# Patient Record
Sex: FEMALE | Race: WHITE | NOT HISPANIC OR LATINO | URBAN - METROPOLITAN AREA
[De-identification: names, ages, dates, MRNs, and addresses within clinical notes are randomized per-mention and may not be internally consistent; named-entity substitution may affect disease eponyms.]

---

## 2019-11-20 ENCOUNTER — INPATIENT (INPATIENT)
Facility: HOSPITAL | Age: 64
LOS: 21 days | Discharge: PSYCHIATRIC OR SUBSTANCE USE FACILITY | End: 2019-12-12
Attending: INTERNAL MEDICINE | Admitting: INTERNAL MEDICINE
Payer: MEDICARE

## 2019-11-20 VITALS
SYSTOLIC BLOOD PRESSURE: 153 MMHG | DIASTOLIC BLOOD PRESSURE: 73 MMHG | OXYGEN SATURATION: 99 % | TEMPERATURE: 96 F | HEART RATE: 116 BPM | RESPIRATION RATE: 18 BRPM

## 2019-11-20 DIAGNOSIS — R41.9 UNSPECIFIED SYMPTOMS AND SIGNS INVOLVING COGNITIVE FUNCTIONS AND AWARENESS: ICD-10-CM

## 2019-11-20 LAB
ALBUMIN SERPL ELPH-MCNC: 4.6 G/DL — SIGNIFICANT CHANGE UP (ref 3.5–5.2)
ALP SERPL-CCNC: 75 U/L — SIGNIFICANT CHANGE UP (ref 30–115)
ALT FLD-CCNC: 48 U/L — HIGH (ref 0–41)
ANION GAP SERPL CALC-SCNC: 18 MMOL/L — HIGH (ref 7–14)
APAP SERPL-MCNC: <5 UG/ML — LOW (ref 10–30)
AST SERPL-CCNC: 53 U/L — HIGH (ref 0–41)
BASOPHILS # BLD AUTO: 0.06 K/UL — SIGNIFICANT CHANGE UP (ref 0–0.2)
BASOPHILS NFR BLD AUTO: 0.7 % — SIGNIFICANT CHANGE UP (ref 0–1)
BILIRUB SERPL-MCNC: 1 MG/DL — SIGNIFICANT CHANGE UP (ref 0.2–1.2)
BUN SERPL-MCNC: 4 MG/DL — LOW (ref 10–20)
CALCIUM SERPL-MCNC: 9.5 MG/DL — SIGNIFICANT CHANGE UP (ref 8.5–10.1)
CHLORIDE SERPL-SCNC: 96 MMOL/L — LOW (ref 98–110)
CO2 SERPL-SCNC: 24 MMOL/L — SIGNIFICANT CHANGE UP (ref 17–32)
CREAT SERPL-MCNC: 0.6 MG/DL — LOW (ref 0.7–1.5)
EOSINOPHIL # BLD AUTO: 0.06 K/UL — SIGNIFICANT CHANGE UP (ref 0–0.7)
EOSINOPHIL NFR BLD AUTO: 0.7 % — SIGNIFICANT CHANGE UP (ref 0–8)
ETHANOL SERPL-MCNC: <10 MG/DL — SIGNIFICANT CHANGE UP
GLUCOSE SERPL-MCNC: 133 MG/DL — HIGH (ref 70–99)
HCT VFR BLD CALC: 42.6 % — SIGNIFICANT CHANGE UP (ref 37–47)
HGB BLD-MCNC: 15.3 G/DL — SIGNIFICANT CHANGE UP (ref 12–16)
IMM GRANULOCYTES NFR BLD AUTO: 0.7 % — HIGH (ref 0.1–0.3)
LYMPHOCYTES # BLD AUTO: 2.85 K/UL — SIGNIFICANT CHANGE UP (ref 1.2–3.4)
LYMPHOCYTES # BLD AUTO: 33.2 % — SIGNIFICANT CHANGE UP (ref 20.5–51.1)
MCHC RBC-ENTMCNC: 31.6 PG — HIGH (ref 27–31)
MCHC RBC-ENTMCNC: 35.9 G/DL — SIGNIFICANT CHANGE UP (ref 32–37)
MCV RBC AUTO: 88 FL — SIGNIFICANT CHANGE UP (ref 81–99)
MONOCYTES # BLD AUTO: 0.6 K/UL — SIGNIFICANT CHANGE UP (ref 0.1–0.6)
MONOCYTES NFR BLD AUTO: 7 % — SIGNIFICANT CHANGE UP (ref 1.7–9.3)
NEUTROPHILS # BLD AUTO: 4.96 K/UL — SIGNIFICANT CHANGE UP (ref 1.4–6.5)
NEUTROPHILS NFR BLD AUTO: 57.7 % — SIGNIFICANT CHANGE UP (ref 42.2–75.2)
NRBC # BLD: 0 /100 WBCS — SIGNIFICANT CHANGE UP (ref 0–0)
PLATELET # BLD AUTO: 201 K/UL — SIGNIFICANT CHANGE UP (ref 130–400)
POTASSIUM SERPL-MCNC: 3.1 MMOL/L — LOW (ref 3.5–5)
POTASSIUM SERPL-SCNC: 3.1 MMOL/L — LOW (ref 3.5–5)
PROT SERPL-MCNC: 8.7 G/DL — HIGH (ref 6–8)
RBC # BLD: 4.84 M/UL — SIGNIFICANT CHANGE UP (ref 4.2–5.4)
RBC # FLD: 12.1 % — SIGNIFICANT CHANGE UP (ref 11.5–14.5)
SALICYLATES SERPL-MCNC: <0.3 MG/DL — LOW (ref 4–30)
SODIUM SERPL-SCNC: 138 MMOL/L — SIGNIFICANT CHANGE UP (ref 135–146)
WBC # BLD: 8.59 K/UL — SIGNIFICANT CHANGE UP (ref 4.8–10.8)
WBC # FLD AUTO: 8.59 K/UL — SIGNIFICANT CHANGE UP (ref 4.8–10.8)

## 2019-11-20 PROCEDURE — 70450 CT HEAD/BRAIN W/O DYE: CPT | Mod: 26

## 2019-11-20 PROCEDURE — 93010 ELECTROCARDIOGRAM REPORT: CPT

## 2019-11-20 PROCEDURE — 99285 EMERGENCY DEPT VISIT HI MDM: CPT

## 2019-11-20 PROCEDURE — 71045 X-RAY EXAM CHEST 1 VIEW: CPT | Mod: 26

## 2019-11-20 RX ORDER — POTASSIUM CHLORIDE 20 MEQ
40 PACKET (EA) ORAL ONCE
Refills: 0 | Status: COMPLETED | OUTPATIENT
Start: 2019-11-20 | End: 2019-11-20

## 2019-11-20 NOTE — ED PROVIDER NOTE - OBJECTIVE STATEMENT
64y/o F w/ recent diagnosis of psychosis 2/2 to traumatic event ( recently passed away while pt was in [place]) today pt was brought from [place] after discharge from psych facility.  Family members who bring pt says that she has no where to stay. pt denies suicidal or homicidal ideations.  alert&ox3, but it seems there are times pt does not recognize reality, repeats her name is "[facility]" as per family.  pt denies any cp or sob. no recent fevers or chills.  no cough or congestion.  denies hallucinations.

## 2019-11-20 NOTE — ED PROVIDER NOTE - ATTENDING CONTRIBUTION TO CARE
I personally evaluated the patient. I reviewed the Resident’s or Physician Assistant’s note (as assigned above), and agree with the findings and plan except as documented in my note.    Pt is a 64 y/o female with recently diagnosed psychosis after losing , and placement in psych facility in [place], presents to ED for evaluation for psychosis. Moderate, constant, no change since onset. Pt was moved here for eval. Unknown if pt compliant with meds. no SI/HI. No chest pain, SOB, abd pain, n/v, fever.    Constitutional: Well developed, well nourished. NAD.  Head: Normocephalic, atraumatic.  Eyes: PERRL. EOMI.  ENT: No nasal discharge. Mucous membranes moist.  Neck: Supple. Painless ROM.  Cardiovascular: Normal S1, S2. Regular rate and rhythm. No murmurs, rubs, or gallops.  Pulmonary: Normal respiratory rate and effort. Lungs clear to auscultation bilaterally. No wheezing, rales, or rhonchi.  Abdominal: Soft. Nondistended. Nontender. No rebound, guarding, rigidity.  Extremities. Pelvis stable. No lower extremity edema, symmetric calves.  Skin: No rashes, cyanosis.  Neuro: Awake, follows commands. Disoriented.  Psych: Normal mood. Normal affect.

## 2019-11-20 NOTE — ED BEHAVIORAL HEALTH ASSESSMENT NOTE - REFERRAL / APPOINTMENT DETAILS
Patient can be referred to [facility] for outpatient psychiatric care at [address] [phone].

## 2019-11-20 NOTE — ED BEHAVIORAL HEALTH ASSESSMENT NOTE - OTHER PAST PSYCHIATRIC HISTORY (INCLUDE DETAILS REGARDING ONSET, COURSE OF ILLNESS, INPATIENT/OUTPATIENT TREATMENT)
PPH limited as patient is not able to report dx or h/o treatment. Niece provides information about recent treatment, as above.

## 2019-11-20 NOTE — ED PROVIDER NOTE - PHYSICAL EXAMINATION
VITAL SIGNS: I have reviewed nursing notes and confirm.  CONSTITUTIONAL: Well-developed; well-nourished; in no acute distress. pt comfortable.  SKIN: skin exam is warm and dry, no acute rash.   HEAD: Normocephalic; atraumatic.  EYES:  EOM intact; conjunctiva and sclera clear.  ENT: No nasal discharge; airway clear. moist oral mucosa;   NECK: Supple; non tender.  CARD: S1, S2 normal; no murmurs, gallops, or rubs. Regular rate and rhythm. posterior tibial and radial pulses 2+  RESP: No wheezes, rales or rhonchi. cta b/l. no use of accessory muscles. no retractions  ABD: Normal bowel sounds; soft; non-distended; non-tender; no rebound. negative psoas, rovsign's and murphys.  EXT: Normal ROM. No  cyanosis or edema.  BACK: No cva tenderness  LYMPH: No acute cervical adenopathy.  NEURO: Alert, oriented, grossly unremarkable.  CN 2-12 intact. normal gait. normal romberg's.  sensory grossly intact to face, upper and lower extremity.  5/5 strength to , extension and flexion at elbow, flexion at hip, extension and flexion at knees.  PSYCH: denies hallucinations, follows commands, responds appropriately. wants to leave, but cooperates when told to wait for labs and psych.

## 2019-11-20 NOTE — ED BEHAVIORAL HEALTH ASSESSMENT NOTE - SUMMARY
64yo female, recently  with subsequent IPP admission and relocation to [place] with family after discharge. Patient with unclear psychiatric h/o, discharge dx acute psychosis, and remote h/o polysubstance abuse with no reported suicide attempts. Patient brought in by niece for a higher level of care with the family unable to provide appropriate supervision.  On exam, patient denies any depression or anxiety but is minimally engaged. She is a poor historian with no insight into her reason for presentation. there are no overt signs of psychosis and she denies any suicidal ideation. Patient appears to have significant  impairment in cognition, possibly secondary to h/o polysubstance abuse. Per collateral, patient appears to be in a chronic state of impairment with no acute signs since discharge from Health system.   Patient presentation appear consistent with neurocognitive impairment, increasing her risk for unintentional self-harm. It is unlikely that this would be mitigated by inpatient psychiatry hospitalization; however, she will likely benefit from a higher level of monitoring in the outpatient setting.

## 2019-11-20 NOTE — ED BEHAVIORAL HEALTH ASSESSMENT NOTE - DETAILS
Patient was admitted at [facility] for 7 days patient denies any suicidal thoughts or suicide attempts. discussed with saman voicemail left at [phone]

## 2019-11-20 NOTE — ED BEHAVIORAL HEALTH ASSESSMENT NOTE - DESCRIPTION
patient seen and evaluated by ED providers. Medical workup completed. none reported patient was  for over 25years, relocated with  to [place] for  to support his family.  was caregiver for pt until he passed recently. pt has 1 son who lives in [place]. Patient was staying with niece prior to ED presentation.

## 2019-11-20 NOTE — ED BEHAVIORAL HEALTH ASSESSMENT NOTE - MEDICATIONS (PRESCRIPTIONS, DIRECTIONS)
prolixin 1mg bid, trazodone 100mg qhs. Would recommended avoiding benzodiazepines as they can increase risk for delirium.

## 2019-11-20 NOTE — ED PROVIDER NOTE - CLINICAL SUMMARY MEDICAL DECISION MAKING FREE TEXT BOX
pt presented to ED for evaluation for psychosis. LAbs, EKG, advanced imaging obtained. Pt evaluated by psychiatry and cleared from their service, do not believe it is acute onset psychosis but rather underlying delirium. Will admit to medicine for further eval.

## 2019-11-20 NOTE — ED BEHAVIORAL HEALTH ASSESSMENT NOTE - SAFETY PLAN DETAILS
patient unable to engage in safety planning secondary to cognitive impairment. Patient warrants a higher level of outpatient observation

## 2019-11-20 NOTE — ED BEHAVIORAL HEALTH ASSESSMENT NOTE - NS ED BHA MED ROS ENT MOUTH
Abdomen soft, nondistended, non-tender, no rebound or guarding.  Rectal: gross, bright red blood on glove tip, external hemorrhoid noted No complaints

## 2019-11-20 NOTE — ED BEHAVIORAL HEALTH ASSESSMENT NOTE - HPI (INCLUDE ILLNESS QUALITY, SEVERITY, DURATION, TIMING, CONTEXT, MODIFYING FACTORS, ASSOCIATED SIGNS AND SYMPTOMS)
62yo female, recently  with subsequent IPP admission and relocation to [place] with family after discharge. Patient with unclear psychiatric h/o, discharge dx acute psychosis, and remote h/o polysubstance abuse with no reported suicide attempts. Patient brought in by niece for a higher level of care with the family unable to provide appropriate supervision.  Patient does not know why she was brought to the hospital and denies any concerns. She denies feeling sad or having any worries. She denies any perceptual disturbances. She denies any h/o trauma.   She answers most questions with "I don't know", unable to list her medications, psychiatric diagnosis/hospitalizations, reason for presentation. Patient reports feeling safe at sister's home, but states that she doesn't do anything during the day.     Collateral from patient's niece: she states that after patient's   suddenly, the patient told her son that he was sleeping and wouldn't wake up. Once he was pronounced dead, the patient ran into street wailing "who's going to take care of me" with some mention of ending her life. She was subsequently hospitalized and discharged with the above diagnosis. Patient reports that the hospital recommended patient be transferred to a long-term care facility; however, with family in [place] she was relocated. Niece reports that now patient appears to be in her chronic state of impairment, with loose associations and poor insight and recall. She states that the patient has been like this for years and is why the  had to provide close supervision; however, there is no one available in the family to provide this level of care now. The family reports that they are unable to safely supervise her.

## 2019-11-20 NOTE — ED BEHAVIORAL HEALTH ASSESSMENT NOTE - ADDITIONAL DETAILS / COMMENTS
patient unable to state who the president is. unable to state the date. loose associations with words that she sees, for example "FIRE EXTINGUISHER" - patient says "why is there a fire".   unable to list months of the year in reverse

## 2019-11-21 LAB
ALBUMIN SERPL ELPH-MCNC: 4.2 G/DL — SIGNIFICANT CHANGE UP (ref 3.5–5.2)
ALP SERPL-CCNC: 68 U/L — SIGNIFICANT CHANGE UP (ref 30–115)
ALT FLD-CCNC: 47 U/L — HIGH (ref 0–41)
ANION GAP SERPL CALC-SCNC: 13 MMOL/L — SIGNIFICANT CHANGE UP (ref 7–14)
AST SERPL-CCNC: 46 U/L — HIGH (ref 0–41)
BILIRUB SERPL-MCNC: 0.9 MG/DL — SIGNIFICANT CHANGE UP (ref 0.2–1.2)
BUN SERPL-MCNC: 6 MG/DL — LOW (ref 10–20)
CALCIUM SERPL-MCNC: 9.3 MG/DL — SIGNIFICANT CHANGE UP (ref 8.5–10.1)
CHLORIDE SERPL-SCNC: 101 MMOL/L — SIGNIFICANT CHANGE UP (ref 98–110)
CO2 SERPL-SCNC: 27 MMOL/L — SIGNIFICANT CHANGE UP (ref 17–32)
CREAT SERPL-MCNC: 0.5 MG/DL — LOW (ref 0.7–1.5)
GLUCOSE SERPL-MCNC: 106 MG/DL — HIGH (ref 70–99)
HCT VFR BLD CALC: 43.8 % — SIGNIFICANT CHANGE UP (ref 37–47)
HGB BLD-MCNC: 15.6 G/DL — SIGNIFICANT CHANGE UP (ref 12–16)
MAGNESIUM SERPL-MCNC: 1.9 MG/DL — SIGNIFICANT CHANGE UP (ref 1.8–2.4)
MCHC RBC-ENTMCNC: 31.5 PG — HIGH (ref 27–31)
MCHC RBC-ENTMCNC: 35.6 G/DL — SIGNIFICANT CHANGE UP (ref 32–37)
MCV RBC AUTO: 88.5 FL — SIGNIFICANT CHANGE UP (ref 81–99)
NRBC # BLD: 0 /100 WBCS — SIGNIFICANT CHANGE UP (ref 0–0)
PLATELET # BLD AUTO: 175 K/UL — SIGNIFICANT CHANGE UP (ref 130–400)
POTASSIUM SERPL-MCNC: 3.9 MMOL/L — SIGNIFICANT CHANGE UP (ref 3.5–5)
POTASSIUM SERPL-SCNC: 3.9 MMOL/L — SIGNIFICANT CHANGE UP (ref 3.5–5)
PROT SERPL-MCNC: 8.2 G/DL — HIGH (ref 6–8)
RBC # BLD: 4.95 M/UL — SIGNIFICANT CHANGE UP (ref 4.2–5.4)
RBC # FLD: 12 % — SIGNIFICANT CHANGE UP (ref 11.5–14.5)
SODIUM SERPL-SCNC: 141 MMOL/L — SIGNIFICANT CHANGE UP (ref 135–146)
TSH SERPL-MCNC: 0.61 UIU/ML — SIGNIFICANT CHANGE UP (ref 0.27–4.2)
VIT B12 SERPL-MCNC: 719 PG/ML — SIGNIFICANT CHANGE UP (ref 232–1245)
WBC # BLD: 7.12 K/UL — SIGNIFICANT CHANGE UP (ref 4.8–10.8)
WBC # FLD AUTO: 7.12 K/UL — SIGNIFICANT CHANGE UP (ref 4.8–10.8)

## 2019-11-21 PROCEDURE — 99222 1ST HOSP IP/OBS MODERATE 55: CPT | Mod: AI

## 2019-11-21 RX ORDER — TRAZODONE HCL 50 MG
100 TABLET ORAL AT BEDTIME
Refills: 0 | Status: DISCONTINUED | OUTPATIENT
Start: 2019-11-21 | End: 2019-12-10

## 2019-11-21 RX ORDER — FLUPHENAZINE HYDROCHLORIDE 1 MG/1
1 TABLET, FILM COATED ORAL
Refills: 0 | Status: DISCONTINUED | OUTPATIENT
Start: 2019-11-21 | End: 2019-12-10

## 2019-11-21 RX ORDER — CHLORHEXIDINE GLUCONATE 213 G/1000ML
1 SOLUTION TOPICAL
Refills: 0 | Status: DISCONTINUED | OUTPATIENT
Start: 2019-11-21 | End: 2019-12-12

## 2019-11-21 RX ORDER — NICOTINE POLACRILEX 2 MG
1 GUM BUCCAL DAILY
Refills: 0 | Status: DISCONTINUED | OUTPATIENT
Start: 2019-11-21 | End: 2019-11-23

## 2019-11-21 RX ADMIN — Medication 1 PATCH: at 12:44

## 2019-11-21 RX ADMIN — Medication 40 MILLIEQUIVALENT(S): at 01:07

## 2019-11-21 RX ADMIN — Medication 100 MILLIGRAM(S): at 21:46

## 2019-11-21 RX ADMIN — FLUPHENAZINE HYDROCHLORIDE 1 MILLIGRAM(S): 1 TABLET, FILM COATED ORAL at 06:23

## 2019-11-21 RX ADMIN — Medication 1 PATCH: at 21:47

## 2019-11-21 RX ADMIN — Medication 100 MILLIGRAM(S): at 06:22

## 2019-11-21 RX ADMIN — FLUPHENAZINE HYDROCHLORIDE 1 MILLIGRAM(S): 1 TABLET, FILM COATED ORAL at 17:00

## 2019-11-21 NOTE — H&P ADULT - NSHPSOCIALHISTORY_GEN_ALL_CORE
Smoker 1 paq per day for a long time  Denies alcohol use or illicit drug use  Patient was  for over 25years, relocated with  to [place] for  to support his family.  was caregiver for pt until he passed recently. pt has 1 son who lives in [place]. Patient was staying with niece prior to ED presentation.

## 2019-11-21 NOTE — CONSULT NOTE ADULT - ASSESSMENT
IMPRESSION: Rehab of gait dysfunction    PRECAUTIONS: [  ] Cardiac  [  ] Respiratory  [  ] Seizures [  ] Contact Isolation  [  ] Droplet Isolation  [  ] Other    Weight Bearing Status:     RECOMMENDATION:    Out of Bed to Chair     DVT/Decubiti Prophylaxis    REHAB PLAN:     [ x  ] Bedside P/T 3-5 times a week   [   ]   Bedside O/T  2-3 times a week             [   ] No Rehab Therapy Indicated                   [   ]  Speech Therapy   Conditioning/ROM                                    ADL  Bed Mobility                                               Conditioning/ROM  Transfers                                                     Bed Mobility  Sitting /Standing Balance                         Transfers                                        Gait Training                                               Sitting/Standing Balance  Stair Training [   ]Applicable                    Home equipment Eval                                                                        Splinting  [   ] Only      GOALS:   ADL   [   ]   Independent                    Transfers  [x   ] Independent                          Ambulation  [ x  ] Independent     [  x  ] With device                            [   ]  CG                                                         [   ]  CG                                                                  [   ] CG                            [    ] Min A                                                   [   ] Min A                                                              [   ] Min  A          DISCHARGE PLAN:   [   ]  Good candidate for Intensive Rehabilitation/Hospital based                                             Will tolerate 3hrs Intensive Rehab Daily                                       [ x   ]  Short Term Rehab in Skilled Nursing Facility                            vs           [ x   ]  Home with Outpatient or VN services                                         [    ]  Possible Candidate for Intensive Hospital based Rehab

## 2019-11-21 NOTE — CONSULT NOTE ADULT - SUBJECTIVE AND OBJECTIVE BOX
HPI:  Ms. Matos is 62 yo female with no known PMH recently  with subsequent IPP admission and relocation to [place] with family after discharge. Patient with unclear psychiatric h/o, discharge dx acute psychosis, and remote h/o polysubstance abuse with no reported suicide attempts. Patient brought in by niece for a higher level of care with the family unable to provide appropriate supervision.    Patient does not know why she was brought to the hospital and denies any concerns. She denies feeling sad or having any worries. She denies any perceptual disturbances. She denies any h/o trauma. She answers most questions with "I don't know", unable to list her medications, psychiatric diagnosis/hospitalizations, reason for presentation. Patient reports feeling safe at sister's home, but states that she doesn't do anything during the day.     Patient's niece reports that the hospital recommended patient be transferred to a long-term care facility; however, with family in [place] she was relocated. Niece reports that now patient appears to be in her chronic state of impairment, with loose associations and poor insight and recall. She states that the patient has been like this for years and is why the  had to provide close supervision; however, there is no one available in the family to provide this level of care now. The family reports that they are unable to safely supervise her.    Currently patient denies any chest pain or sob, no recent fevers or chills, no cough or congestion, denies hallucinations. Patient was evaluated by psychiatry, differential include: neurocognitive impairment, tbi, complex bereavement, psychosis. (21 Nov 2019 05:13)      PAST MEDICAL & SURGICAL HISTORY:  Psychosis      Hospital Course:    TODAY'S SUBJECTIVE & REVIEW OF SYMPTOMS:     Constitutional WNL   Cardio WNL   Resp WNL   GI WNL  Heme WNL  Endo WNL  Skin WNL  MSK Weakness  Neuro WNL  Cognitive WNL  Psych agitated      MEDICATIONS  (STANDING):  chlorhexidine 4% Liquid 1 Application(s) Topical <User Schedule>  fluPHENAZine 1 milliGRAM(s) Oral two times a day  traZODone 100 milliGRAM(s) Oral at bedtime    MEDICATIONS  (PRN):      FAMILY HISTORY:      Allergies    No Known Allergies    Intolerances        SOCIAL HISTORY:    [  ] Etoh  [  ] Smoking  [  ] Substance abuse     Home Environment:  [  ] Home Alone  [x  ] Lives with Family  [  ] Home Health Aid    Dwelling:  [x  ] Apartment  [  ] Private House  [  ] Adult Home  [  ] Skilled Nursing Facility      [  ] Short Term  [  ] Long Term  [  ] Stairs       Elevator [x  ]    FUNCTIONAL STATUS PTA: (Check all that apply)  Ambulation: [ x  ]Independent    [  ] Dependent     [  ] Non-Ambulatory  Assistive Device: [  ] SA Cane  [  ]  Q Cane  [  ] Walker  [  ]  Wheelchair  ADL : [ x ] Independent  [  ]  Dependent       Vital Signs Last 24 Hrs  T(C): 36.8 (21 Nov 2019 07:37), Max: 36.8 (21 Nov 2019 07:37)  T(F): 98.3 (21 Nov 2019 07:37), Max: 98.3 (21 Nov 2019 07:37)  HR: 82 (21 Nov 2019 07:37) (75 - 116)  BP: 120/65 (21 Nov 2019 07:37) (110/72 - 153/73)  BP(mean): 87 (21 Nov 2019 06:04) (87 - 87)  RR: 18 (21 Nov 2019 07:37) (18 - 18)  SpO2: 96% (21 Nov 2019 07:37) (95% - 99%)      PHYSICAL EXAM: Alert & awake  GENERAL: NAD, well-groomed, well-developed  HEAD:  Atraumatic, Normocephalic  CHEST/LUNG: Clear   HEART: S1S2+  ABDOMEN: Soft, Nontender  EXTREMITIES:  no calf tenderness    NERVOUS SYSTEM:  Cranial Nerves 2-12 intact [  ] Abnormal  [  ]  ROM: WFL all extremities [ x ]  Abnormal [  ]  Motor Strength: WFL all extremities  [x  ]  Abnormal [  ]  Sensation: intact to light touch [ x ] Abnormal [  ]  Reflexes: Symmetric [  ]  Abnormal [  ]    FUNCTIONAL STATUS:  Bed Mobility: Independent [  ]  Supervision [ x ]  Needs Assistance [  ]  N/A [  ]  Transfers: Independent [  ]  Supervision [  ]  Needs Assistance [x  ]  N/A [  ]   Ambulation: Independent [  ]  Supervision [  ]  Needs Assistance [  ]  N/A [  ]  ADL: Independent [  ] Requires Assistance [  ] N/A [  ]      LABS:                        15.3   8.59  )-----------( 201      ( 20 Nov 2019 20:00 )             42.6     11-20    138  |  96<L>  |  4<L>  ----------------------------<  133<H>  3.1<L>   |  24  |  0.6<L>    Ca    9.5      20 Nov 2019 20:00    TPro  8.7<H>  /  Alb  4.6  /  TBili  1.0  /  DBili  x   /  AST  53<H>  /  ALT  48<H>  /  AlkPhos  75  11-20          RADIOLOGY & ADDITIONAL STUDIES:    Assesment:

## 2019-11-21 NOTE — ED ADULT NURSE NOTE - NSIMPLEMENTINTERV_GEN_ALL_ED
Implemented All Fall with Harm Risk Interventions:  [place] to call system. Call bell, personal items and telephone within reach. Instruct patient to call for assistance. Room bathroom lighting operational. Non-slip footwear when patient is off stretcher. Physically safe environment: no spills, clutter or unnecessary equipment. Stretcher in lowest position, wheels locked, appropriate side rails in place. Provide visual cue, wrist band, yellow gown, etc. Monitor gait and stability. Monitor for mental status changes and reorient to person, place, and time. Review medications for side effects contributing to fall risk. Reinforce activity limits and safety measures with patient and family. Provide visual clues: red socks.

## 2019-11-21 NOTE — ED ADULT NURSE NOTE - OBJECTIVE STATEMENT
pt brought in by family for suicidal thoughts. as per family, pt was in psych facility in [place] and was D/C with ativan. family states pt was homeless so drove pt to [place] and took her to hospital. family also states pt recently had a traumatic event; her  passed. pt is axox3 at times but with periods of disorientation. pt states " my name is [facility]" repeatedly asking if she is dying.

## 2019-11-21 NOTE — H&P ADULT - ATTENDING COMMENTS
Patient seen and examined independently. I agree with the resident's note, physical exam, and plan except as below.  Vital Signs Last 24 Hrs  T(C): 36.5 (21 Nov 2019 12:43), Max: 36.8 (21 Nov 2019 07:37)  T(F): 97.7 (21 Nov 2019 12:43), Max: 98.3 (21 Nov 2019 07:37)  HR: 84 (21 Nov 2019 12:43) (75 - 116)  BP: 123/78 (21 Nov 2019 12:43) (110/72 - 153/73)  BP(mean): 87 (21 Nov 2019 06:04) (87 - 87)  RR: 18 (21 Nov 2019 12:43) (18 - 18)  SpO2: 98% (21 Nov 2019 12:43) (95% - 99%)  Pe  nad  aaox3  anaswers "I don't know" alot  [id number]  ctabl  soft ntnd+bs   no cce  nonfocal  no si/hi/hallucinations    #psychosis nos/atypical bereavement/ cognitive decline  check utox, b12, folate, rpr, hiv,   cont meds per psych consult -, no IPP  if  family unable to care - may need long term placement

## 2019-11-21 NOTE — H&P ADULT - ASSESSMENT
Ms. Matos is 64 yo female with no known PMH recently  with subsequent IPP admission and relocation to [place] with family after discharge. Patient with unclear psychiatric history, discharge diagnosis of acute psychosis, and remote h/o polysubstance abuse with no reported suicide attempts. Patient brought in by niece for a higher level of care with the family unable to provide appropriate supervision.    # R/o neurocognitive impairment, complex bereavement or psychosis.  - CT head: no CT evidence of acute intracranial pathology.  - Evaluated by psychiatry: Low Acute Suicide Risk, no h/o suicide attempt, no current suicidal ideation.   - Continue prolixin 1mg bid, trazodone 100mg qhs.  - avoiding benzodiazepines as they can increase risk for delirium.  - Patient warrants a higher level of outpatient observation,  consult placed

## 2019-11-21 NOTE — H&P ADULT - NSHPPHYSICALEXAM_GEN_ALL_CORE
Vital Signs Last 24 Hrs  T(C): 35.7 (20 Nov 2019 19:06), Max: 35.7 (20 Nov 2019 19:06)  T(F): 96.3 (20 Nov 2019 19:06), Max: 96.3 (20 Nov 2019 19:06)  HR: 75 (21 Nov 2019 00:05) (75 - 116)  BP: 110/72 (21 Nov 2019 00:05) (110/72 - 153/73)  RR: 18 (20 Nov 2019 19:06) (18 - 18)  SpO2: 99% (20 Nov 2019 19:06) (99% - 99%)      PHYSICAL EXAM:  GENERAL: NAD, speaks in full sentences, no signs of respiratory distress, on room air, anxious  HEAD:  Atraumatic, Normocephalic  EYES: injected conjunctiva and anicteric sclera  NECK: Supple  CHEST/LUNG: Clear to auscultation bilaterally; No wheeze; No crackles; No accessory muscles used  HEART: Regular rate and rhythm; No murmurs;   ABDOMEN: Soft, Nontender, Nondistended; Bowel sounds present; No guarding  EXTREMITIES:  2+ Peripheral Pulses, No cyanosis or edema  PSYCH: AAO*1  NEUROLOGY: non-focal  SKIN: No rashes or lesions

## 2019-11-21 NOTE — H&P ADULT - HISTORY OF PRESENT ILLNESS
Ms. Matos is 64 yo female with no known PMH recently  with subsequent IPP admission and relocation to [place] with family after discharge. Patient with unclear psychiatric h/o, discharge dx acute psychosis, and remote h/o polysubstance abuse with no reported suicide attempts. Patient brought in by niece for a higher level of care with the family unable to provide appropriate supervision.    Patient does not know why she was brought to the hospital and denies any concerns. She denies feeling sad or having any worries. She denies any perceptual disturbances. She denies any h/o trauma. She answers most questions with "I don't know", unable to list her medications, psychiatric diagnosis/hospitalizations, reason for presentation. Patient reports feeling safe at sister's home, but states that she doesn't do anything during the day.     Patient's niece reports that the hospital recommended patient be transferred to a long-term care facility; however, with family in [place] she was relocated. Niece reports that now patient appears to be in her chronic state of impairment, with loose associations and poor insight and recall. She states that the patient has been like this for years and is why the  had to provide close supervision; however, there is no one available in the family to provide this level of care now. The family reports that they are unable to safely supervise her.    Currently patient denies any chest pain or sob, no recent fevers or chills, no cough or congestion, denies hallucinations. Patient was evaluated by psychiatry, differential include: neurocognitive impairment, tbi, complex bereavement, psychosis.

## 2019-11-22 LAB
AMPHET UR-MCNC: NEGATIVE — SIGNIFICANT CHANGE UP
BARBITURATES UR SCN-MCNC: NEGATIVE — SIGNIFICANT CHANGE UP
BENZODIAZ UR-MCNC: NEGATIVE — SIGNIFICANT CHANGE UP
COCAINE METAB.OTHER UR-MCNC: NEGATIVE — SIGNIFICANT CHANGE UP
HCV AB S/CO SERPL IA: 11.95 S/CO — HIGH (ref 0–0.99)
HCV AB SERPL-IMP: REACTIVE
HIV 1+2 AB+HIV1 P24 AG SERPL QL IA: SIGNIFICANT CHANGE UP
METHADONE UR-MCNC: NEGATIVE — SIGNIFICANT CHANGE UP
OPIATES UR-MCNC: NEGATIVE — SIGNIFICANT CHANGE UP
PCP SPEC-MCNC: SIGNIFICANT CHANGE UP
PROPOXYPHENE QUALITATIVE URINE RESULT: NEGATIVE — SIGNIFICANT CHANGE UP
T PALLIDUM AB TITR SER: NEGATIVE — SIGNIFICANT CHANGE UP

## 2019-11-22 PROCEDURE — 99232 SBSQ HOSP IP/OBS MODERATE 35: CPT

## 2019-11-22 RX ORDER — ENOXAPARIN SODIUM 100 MG/ML
40 INJECTION SUBCUTANEOUS DAILY
Refills: 0 | Status: DISCONTINUED | OUTPATIENT
Start: 2019-11-22 | End: 2019-12-10

## 2019-11-22 RX ADMIN — Medication 1 PATCH: at 08:35

## 2019-11-22 RX ADMIN — Medication 1 PATCH: at 12:44

## 2019-11-22 RX ADMIN — Medication 1 PATCH: at 12:58

## 2019-11-22 RX ADMIN — FLUPHENAZINE HYDROCHLORIDE 1 MILLIGRAM(S): 1 TABLET, FILM COATED ORAL at 17:38

## 2019-11-22 RX ADMIN — Medication 100 MILLIGRAM(S): at 21:20

## 2019-11-22 RX ADMIN — FLUPHENAZINE HYDROCHLORIDE 1 MILLIGRAM(S): 1 TABLET, FILM COATED ORAL at 06:31

## 2019-11-22 NOTE — BEHAVIORAL HEALTH ASSESSMENT NOTE - NSBHCONSULTRECOMMENDOTHER_PSY_A_CORE FT
Patient denies any depression, anxiety or suicidal ideations and is not exhibiting any overt signs of psychosis. Patient appears to have significant impairment in cognition, possibly secondary to history of polysubstance abuse. As per collateral, patient is unable to perform ADLs and there have been no acute changes since her discharge from [facility] in [place]. Patient presentation is consistent with neurocognitive impairment, increasing her risk for unintentional self harm. She will benefit from being place in a long term care facility for dementia. Patient denies any depression, anxiety or suicidal ideations and is not exhibiting any overt signs of psychosis. Patient appears to have significant impairment in cognition, possibly secondary to history of polysubstance abuse. Patient presentation is consistent with neurocognitive impairment, and she does not appear that she would be able to perform her ADLs independently.   Considering her history of psychosis based on reports form collateral will recommend to continue the Fluphenazine 1 mg BID. Can also use trazodoen for insomnia.   She will benefit from being place in a long term care facility for dementia.

## 2019-11-22 NOTE — BEHAVIORAL HEALTH ASSESSMENT NOTE - ADDITIONAL DETAILS / COMMENTS
patient unable to state who the president is. unable to state the date. loose associations with words that she sees, for example "FIRE EXTINGUISHER" - patient says "why is there a fire".   unable to list months of the year in reverse Oriented x 2 - person and place. Not oriented to situation or time

## 2019-11-22 NOTE — PROGRESS NOTE ADULT - SUBJECTIVE AND OBJECTIVE BOX
BHAVESH LOWERY 63y Female  MRN#: [id number]   CODE STATUS:________      SUBJECTIVE  Patient is a 63y old Female who presents with a chief complaint of Change in mental status (22 Nov 2019 12:34)  Currently admitted to medicine with the primary diagnosis of Mental status change resolved    Today is hospital day 1d, patient is admitted for placement/depression/unable to take care of her self by niece. This morning she has no new issue, she doesn't like to engage in conversation.        OBJECTIVE  PAST MEDICAL & SURGICAL HISTORY  Psychosis    ALLERGIES:  No Known Allergies    MEDICATIONS:  STANDING MEDICATIONS  chlorhexidine 4% Liquid 1 Application(s) Topical <User Schedule>  fluPHENAZine 1 milliGRAM(s) Oral two times a day  nicotine - 21 mG/24Hr(s) Patch 1 patch Transdermal daily  traZODone 100 milliGRAM(s) Oral at bedtime    PRN MEDICATIONS      VITAL SIGNS: Last 24 Hours  T(C): 36.9 (22 Nov 2019 06:37), Max: 36.9 (21 Nov 2019 21:23)  T(F): 98.5 (22 Nov 2019 06:37), Max: 98.5 (22 Nov 2019 06:37)  HR: 79 (22 Nov 2019 06:37) (74 - 79)  BP: 138/84 (22 Nov 2019 06:37) (125/58 - 138/84)  BP(mean): --  RR: 18 (22 Nov 2019 06:37) (18 - 19)  SpO2: 97% (21 Nov 2019 21:23) (97% - 97%)    PHYSICAL EXAM:  GENERAL: NAD, well-developed, AAOx2-3, difficult to concentrate  HEENT:  Atraumatic, Normocephalic. EOMI, PERRLA, conjunctiva and sclera clear, No JVD  PULMONARY: Clear to auscultation bilaterally; No wheeze  CARDIOVASCULAR: Regular rate and rhythm; No murmurs, rubs, or gallops  GASTROINTESTINAL: Soft, Nontender, Nondistended; Bowel sounds present  MUSCULOSKELETAL:  2+ Peripheral Pulses, No clubbing, cyanosis, or edema  NEUROLOGY: non-focal  SKIN: No rashes or lesions    LABS:                        15.6   7.12  )-----------( 175      ( 21 Nov 2019 12:18 )             43.8     11-21    141  |  101  |  6<L>  ----------------------------<  106<H>  3.9   |  27  |  0.5<L>    Ca    9.3      21 Nov 2019 12:18  Mg     1.9     11-21    TPro  8.2<H>  /  Alb  4.2  /  TBili  0.9  /  DBili  x   /  AST  46<H>  /  ALT  47<H>  /  AlkPhos  68  11-21                  RADIOLOGY:  CT Head No Cont (11.20.19 @ 22:32) >  Impression:     No CT evidence of acute intracranial pathology. BHAVESH LOWERY 63y Female  MRN#: [id number]   CODE STATUS: Full      SUBJECTIVE  Patient is a 63y old Female who presents with a chief complaint of Change in mental status (22 Nov 2019 12:34)  Currently admitted to medicine with the primary diagnosis of Mental status change resolved    Today is hospital day 1d, patient is admitted for placement/depression/unable to take care of her self by niece. This morning she has no new issue, she doesn't like to engage in conversation.        OBJECTIVE  PAST MEDICAL & SURGICAL HISTORY  Psychosis    ALLERGIES:  No Known Allergies    MEDICATIONS:  STANDING MEDICATIONS  chlorhexidine 4% Liquid 1 Application(s) Topical <User Schedule>  fluPHENAZine 1 milliGRAM(s) Oral two times a day  nicotine - 21 mG/24Hr(s) Patch 1 patch Transdermal daily  traZODone 100 milliGRAM(s) Oral at bedtime    PRN MEDICATIONS      VITAL SIGNS: Last 24 Hours  T(C): 36.9 (22 Nov 2019 06:37), Max: 36.9 (21 Nov 2019 21:23)  T(F): 98.5 (22 Nov 2019 06:37), Max: 98.5 (22 Nov 2019 06:37)  HR: 79 (22 Nov 2019 06:37) (74 - 79)  BP: 138/84 (22 Nov 2019 06:37) (125/58 - 138/84)  BP(mean): --  RR: 18 (22 Nov 2019 06:37) (18 - 19)  SpO2: 97% (21 Nov 2019 21:23) (97% - 97%)    PHYSICAL EXAM:  GENERAL: NAD, well-developed, AAOx2-3, difficult to concentrate  HEENT:  Atraumatic, Normocephalic. EOMI, PERRLA, conjunctiva and sclera clear, No JVD  PULMONARY: Clear to auscultation bilaterally; No wheeze  CARDIOVASCULAR: Regular rate and rhythm; No murmurs, rubs, or gallops  GASTROINTESTINAL: Soft, Nontender, Nondistended; Bowel sounds present  MUSCULOSKELETAL:  2+ Peripheral Pulses, No clubbing, cyanosis, or edema  NEUROLOGY: non-focal  SKIN: No rashes or lesions    LABS:                        15.6   7.12  )-----------( 175      ( 21 Nov 2019 12:18 )             43.8     11-21    141  |  101  |  6<L>  ----------------------------<  106<H>  3.9   |  27  |  0.5<L>    Ca    9.3      21 Nov 2019 12:18  Mg     1.9     11-21    TPro  8.2<H>  /  Alb  4.2  /  TBili  0.9  /  DBili  x   /  AST  46<H>  /  ALT  47<H>  /  AlkPhos  68  11-21                  RADIOLOGY:  CT Head No Cont (11.20.19 @ 22:32) >  Impression:     No CT evidence of acute intracranial pathology.

## 2019-11-22 NOTE — PROGRESS NOTE ADULT - ASSESSMENT
62 yo female with no known PMH recently  with subsequent IPP admission in [place] in [place] and relocation to [place] with family after discharge. Patient with unclear psychiatric h/o, discharge dx acute psychosis, and remote h/o polysubstance abuse with no reported suicide attempts. Patient brought in by niece for a higher level of care with the family unable to provide appropriate supervision.  Patient does not know why she was brought to the hospital and denies any concerns. She answers most questions with "I don't know", unable to list her medications, psychiatric diagnosis/hospitalizations, reason for presentation.    #) Cognitive impairment? Acute psychosis secondary to polysubstance abuse?  - fluPHENAZine 1 milliGRAM(s) Oral two times a day  - Niece mentioned patient is unable to take care of herself, and family is unable to care for her, need facility to care for her  - psych consulted two times now: both agreed patient does not need to be in Inpatient psychiatry.  - Due to cognitive impairment patient is increased risk for unintentional self-harm.  - Denies suicidal ideation  - traZODone 100 milliGRAM(s) Oral at bedtime      # smoker  -nicotine - 21 mG/24Hr(s) Patch 1 patch Transdermal daily  -traZODone 100 milliGRAM(s) Oral at bedtime        Activity: increase as tolerated  Gi ppx: no need  Dvt ppx: lovenox  Diet: regular  Dispo: pending placement  Code: full 64 yo female with no known PMH recently  with subsequent IPP admission in [place] in [place] and relocation to [place] with family after discharge. Patient with unclear psychiatric h/o, discharge dx acute psychosis, and remote h/o polysubstance abuse with no reported suicide attempts. Patient brought in by niece for a higher level of care with the family unable to provide appropriate supervision.  Patient does not know why she was brought to the hospital and denies any concerns. She answers most questions with "I don't know", unable to list her medications, psychiatric diagnosis/hospitalizations, reason for presentation.    #) Cognitive impairment? Acute psychosis secondary to polysubstance abuse?  - fluPHENAZine 1 milliGRAM(s) Oral two times a day  - traZODone 100 milliGRAM(s) Oral at bedtime  - patient was admitted for ipp in [place] and was later discharged with the diagnosis of acute psychosis, currently patient has no symptoms  - Niece mentioned patient is unable to take care of herself, and family is unable to care for her, need facility to care for her  - psych consulted two times now: both agreed patient does not need to be in Inpatient psychiatry.  - TSH/b12/symphilis negative  - Due to cognitive impairment patient is increased risk for unintentional self-harm.  - Denies suicidal ideation    # smoker  -nicotine - 21 mG/24Hr(s) Patch 1 patch Transdermal daily    Activity: increase as tolerated  Gi ppx: no need  Dvt ppx: lovenox  Diet: regular  Dispo: pending placement  Code: full 64 yo female with no known PMH recently  with subsequent IPP admission in [place] in [place] and relocation to [place] with family after discharge. Patient with unclear psychiatric h/o, discharge dx acute psychosis, and remote h/o polysubstance abuse with no reported suicide attempts. Patient brought in by niece for a higher level of care with the family unable to provide appropriate supervision.  Patient does not know why she was brought to the hospital and denies any concerns. She answers most questions with "I don't know", unable to list her medications, psychiatric diagnosis/hospitalizations, reason for presentation.    #) Cognitive impairment? Acute psychosis secondary to polysubstance abuse?  -  passed away about a month ago, Grievance vs depression along with cognitive impairment   - fluPHENAZine 1 milliGRAM(s) Oral two times a day  - traZODone 100 milliGRAM(s) Oral at bedtime  - patient was admitted for ipp in [place] and was later discharged with the diagnosis of acute psychosis, currently patient has no symptoms  - Niece mentioned patient is unable to take care of herself, and family is unable to care for her, need facility to care for her  - psych consulted two times now: both agreed patient does not need to be in Inpatient psychiatry, but agreed need constant observation hence 1 to 1 due to unintentional risk of harm  - TSH/b12/symphilis negative  - Due to cognitive impairment patient is increased risk for unintentional self-harm.  - Denies suicidal ideation    # smoker  -nicotine - 21 mG/24Hr(s) Patch 1 patch Transdermal daily    Activity: increase as tolerated  Gi ppx: no need  Dvt ppx: lovenox  Diet: regular  Dispo: pending placement  Code: full

## 2019-11-22 NOTE — BEHAVIORAL HEALTH ASSESSMENT NOTE - DETAILS
Patient was admitted at [facility] for 7 days voicemail left at [phone] patient denies any suicidal thoughts or suicide attempts.

## 2019-11-22 NOTE — PROGRESS NOTE ADULT - ASSESSMENT
64 yo female with no known PMH recently  with subsequent IPP admission and relocation to [place] with family after discharge. Patient with unclear psychiatric h/o, discharge dx acute psychosis, and remote h/o polysubstance abuse with no reported suicide attempts. Patient brought in by niece for a higher level of care with the family unable to provide appropriate supervision.  Patient does not know why she was brought to the hospital and denies any concerns. She answers most questions with "I don't know", unable to list her medications, psychiatric diagnosis/hospitalizations, reason for presentation.    # Psychosis with cognitive decline  - H/o IPP admission for acut psychosis  - CT Head No Cont (11.20.19 @ 22:32) >No CT evidence of acute intracranial pathology.  - Vitamin B12, Serum: 719 pg/mL (11.21.19 @ 12:18)  - Treponema Pallidum Antibody Interpretation: Negative(11.21.19 @ 12:18)  - Thyroid Stimulating Hormone, Serum: 0.61 uIU/mL (11.21.19 @ 12:18)  - psych eval: Patient presentation appear consistent with neurocognitive impairment, increasing her risk for unintentional self-harm.she will likely benefit from a higher level of monitoring in the outpatient setting.  - c/w fluphenazine, trazodone  - psych F/u/re-eval    # DVT prophylaxis    #Full code    #Pending: psych eval   # no family at bedside . will talk to pt;s amador  # Disposition: TBD

## 2019-11-22 NOTE — BEHAVIORAL HEALTH ASSESSMENT NOTE - NSBHCHARTREVIEWVS_PSY_A_CORE FT
Vital Signs Last 24 Hrs  T(C): 36.4 (22 Nov 2019 14:14), Max: 36.9 (21 Nov 2019 21:23)  T(F): 97.5 (22 Nov 2019 14:14), Max: 98.5 (22 Nov 2019 06:37)  HR: 81 (22 Nov 2019 14:14) (74 - 81)  BP: 119/58 (22 Nov 2019 14:14) (119/58 - 138/84)  BP(mean): --  RR: 18 (22 Nov 2019 14:14) (18 - 19)  SpO2: 97% (21 Nov 2019 21:23) (97% - 97%)

## 2019-11-22 NOTE — BEHAVIORAL HEALTH ASSESSMENT NOTE - DIFFERENTIAL
neurocognitive impairment, tbi, complex bereavement, psychosis nos Cognitive disorder NOS    Complex bereavement  Psychosis nos by hx

## 2019-11-22 NOTE — BEHAVIORAL HEALTH ASSESSMENT NOTE - HPI (INCLUDE ILLNESS QUALITY, SEVERITY, DURATION, TIMING, CONTEXT, MODIFYING FACTORS, ASSOCIATED SIGNS AND SYMPTOMS)
62yo female, recently  with subsequent IPP admission and relocation to [place] with family after discharge. Patient with unclear psychiatric h/o, discharge dx acute psychosis, and remote h/o polysubstance abuse with no reported suicide attempts. Patient brought in by niece for a higher level of care with the family unable to provide appropriate supervision.  Patient was seen and interviewed by treatment team. She reports that she is "alright" and denies any feelings of depression or anxiety. Patient denies any SI/HI/AVH as well as any history of trauma. Patient was unable to list psychiatric diagnosis/hospitalizations, medications, as well as reason for presentation. Patient was noted to be fixated on the word "life" and "dying" and would ask interviewer if she "had life" or "was dying" each time those words appeared on the television screen. Patient was reassured that she is safe in the hospital.    Collateral from patient's niece: she states that after patient's   suddenly, the patient told her son that he was sleeping and wouldn't wake up. Once he was pronounced dead, the patient ran into street wailing "who's going to take care of me" with some mention of ending her life. She was subsequently hospitalized and discharged with the above diagnosis. Patient reports that the hospital recommended patient be transferred to a long-term care facility; however, with family in [place] she was relocated. Niece reports that now patient appears to be in her chronic state of impairment, with loose associations and poor insight and recall. She states that the patient has been like this for years and is why the  had to provide close supervision; however, there is no one available in the family to provide this level of care now. The family reports that they are unable to safely supervise her. 62yo female, recently  with subsequent IPP admission and relocation to [place] with family after discharge. Patient with unclear psychiatric h/o, discharge dx acute psychosis, and remote h/o polysubstance abuse with no reported suicide attempts. Patient brought in by niece for a higher level of care with the family unable to provide appropriate supervision.  Patient was seen and interviewed by treatment team. Patient is calm and cooperative. She reports that she is "alright" and denies any feelings of depression or anxiety. Patient denies any SI/HI/AVH as well as any history of trauma. Patient was unable to list psychiatric diagnosis/hospitalizations, medications, as well as reason for presentation. Patient was noted to be fixated on the word "life" and "dying" and would ask interviewer if she "had life" or "was dying" each time those words appeared on the television screen. Patient was reassured that she is safe in the hospital. 64yo female, recently  with subsequent IPP admission and relocation to [place] with family after discharge. Patient with unclear psychiatric h/o, discharge dx acute psychosis, and remote h/o polysubstance abuse with no reported suicide attempts. Patient brought in by saman for a higher level of care with the family unable to provide appropriate supervision.  Patient was seen and interviewed by treatment team. Patient is calm and cooperative. She reports that she is "alright" and denies any feelings of depression or anxiety. Patient denies any SI/HI/AVH as well as any history of trauma. Patient was unable to list psychiatric diagnosis/hospitalizations, medications, as well as reason for presentation. Patient was noted to be fixated on the word "life" and "dying" and would ask interviewer if she "had life" or "was dying" each time those words appeared on the television screen. Patient was reassured that she is safe in the hospital.    Collateral (Omar Hawk-saman): Last Tuesday, patient's  passed away from a heart attack and patient was distraught and ran out of the house screaming that her  was dead. EMS was called and patient was brought to [facility] in [place]. Patient was admitted for 7 days and received a diagnoses of acute psychosis and doctors recommended she be placed in a long-term care facility. She states that in the 70s and 80s patient was heavily involved in drugs such as cocaine, heroine, percocet, valium, etc. In '91 after patient's mother passed away, she states that patient started seeing a therapist who prescribed her xanax and klonopin. Since then, patient has been seeing a different psychiatrist every few months and getting prescribed different medications. She states that patient has a history of multiple psychiatric hospitalizations, but she is unsure of the diagnoses patient was given. She reports that patient at baseline is unable to recall her name, place or time. For the past few years, patient has not been able to care for herself and her late  took care of her such as feeding her. 64yo female, recently  with subsequent IPP admission and relocation to [place] with family after discharge. Patient with unclear psychiatric h/o, discharge dx acute psychosis, and remote h/o polysubstance abuse with no reported suicide attempts. Patient brought in by saman for a higher level of care with the family unable to provide appropriate supervision.  Patient was seen and interviewed by treatment team. Patient is calm and cooperative. She reports that she is "alright" and denies any feelings of depression or anxiety. Patient denies any SI/HI/AVH as well as any history of trauma. Patient was unable to list psychiatric diagnosis/hospitalizations, medications, as well as reason for presentation. Patient was noted to be fixated on the word "life" and "dying" and would ask interviewer if she "had life" or "was dying" each time those words appeared on the television screen. Patient was reassured that she is safe in the hospital.    Collateral (Omar Hawk-saman): Last Tuesday, patient's  passed away from a heart attack and patient was distraught and ran out of the house screaming that her  was dead. EMS was called and patient was brought to [facility] in [place]. Patient was admitted for 7 days and received a diagnoses of acute psychosis and doctors recommended she be placed in a long-term care facility. She states that in the 70s and 80s patient was heavily involved in drugs such as cocaine, heroine, percocet, valium, etc. In '91 after patient's mother passed away, she states that patient started seeing a therapist who prescribed her xanax and klonopin. Since then, patient has been seeing a different psychiatrist every few months and getting prescribed different medications. She states that patient has a history of multiple psychiatric hospitalizations, but she is unsure of the diagnoses patient was given. She reports that patient at baseline is unable to recall her name, place or time. For the past few years, patient has not been able to care for herself and her late  took care of her such as feeding her. She states that family is planning on placing patient in long term care facility as they believe she is unable to care for herself. 62yo female, recently  with subsequent IPP admission and relocation to [place] with family after discharge. Patient with unclear psychiatric h/o, discharge dx acute psychosis, and remote h/o polysubstance abuse with no reported suicide attempts. Patient brought in by niece for a higher level of care with the family unable to provide appropriate supervision. Initially patient was seen in the ER by psychiatry and was found to have neurocognitive disorder. She was admitted to the medical floor. Psychiatry has been reconsulted to evaluate patient for depression.    Patient was seen and interviewed by treatment team. Patient is calm and cooperative. She reports that she is "alright" and denies any feelings of depression or anxiety. Patient denies any SI/HI/AVH as well as any history of trauma. When discussing about the loss of her  she reports that he passed away one month ago but had no affect despite saying it was a sad experience.     Collateral (Omar Hawk-niece): Last Tuesday, patient's  passed away from a heart attack and patient was distraught and ran out of the house screaming that her  was dead. EMS was called and patient was brought to [facility] in [place]. Patient was admitted for 7 days and received a diagnoses of acute psychosis and doctors recommended she be placed in a long-term care facility. She states that in the 70s and 80s patient was heavily involved in drugs such as cocaine, heroine, percocet, valium, etc. In '91 after patient's mother passed away, she states that patient started seeing a psychiatrist who prescribed her xanax and klonopin. Since then, patient has been seeing a different psychiatrist every few months and getting prescribed different medications. She states that patient has a history of psychiatric hospitalizations, but she is unsure of the diagnoses patient was given. She reports that patient at baseline is unable to recall her name, place or time. For the past few years, patient has not been able to care for herself and her late  took care of her including her ADLs feeding her. She states that family is planning on placing patient in long term care facility as they believe she is unable to care for herself.

## 2019-11-22 NOTE — BEHAVIORAL HEALTH ASSESSMENT NOTE - NSBHCONSULTOBSREASON_PSY_A_CORE FT
Patient is not at risk for suicide. However considering her cognitive impairment she is at risk for unintentional self harm. Hence would recommend her to be monitored closely.

## 2019-11-22 NOTE — BEHAVIORAL HEALTH ASSESSMENT NOTE - SUMMARY
64yo female, recently  with subsequent IPP admission and relocation to [place] with family after discharge. Patient with unclear psychiatric h/o, discharge dx acute psychosis, and remote h/o polysubstance abuse with no reported suicide attempts. Patient brought in by niece for a higher level of care with the family unable to provide appropriate supervision.  On exam, patient denies any depression or anxiety but is minimally engaged. She is a poor historian with no insight into her reason for presentation. there are no overt signs of psychosis and she denies any suicidal ideation. Patient appears to have significant  impairment in cognition, possibly secondary to h/o polysubstance abuse. Per collateral, patient appears to be in a chronic state of impairment with no acute signs since discharge from [facility].   Patient presentation appear consistent with neurocognitive impairment, increasing her risk for unintentional self-harm. It is unlikely that this would be mitigated by inpatient psychiatry hospitalization; however, she will likely benefit from a higher level of monitoring in the outpatient setting. 62yo female, recently  with subsequent IPP admission and relocation to [place] with family after discharge. Patient with unclear psychiatric h/o, and remote h/o polysubstance abuse with no reported suicide attempts. Patient brought in by niece for a higher level of care with the family unable to provide appropriate supervision.       On exam, patient denies any depression or anxiety. She is a poor historian with no insight into her reason for presentation. There are no overt signs of psychosis or paranoia. She denies any SI. Patient appears to have significant  impairment in cognition which reportedly has been her baseline as per collateral. Patient presentation appear consistent with neurocognitive impairment, increasing her risk for unintentional self-harm. It is unlikely that this would be mitigated by inpatient psychiatry hospitalization; however, she will likely benefit from a higher level of monitoring in the outpatient setting.

## 2019-11-22 NOTE — PROGRESS NOTE ADULT - SUBJECTIVE AND OBJECTIVE BOX
Patient is a 63y old  Female who presents with a chief complaint of Change in mental status (22 Nov 2019 10:00)    Patient was seen and examined.  Denies headache, dizziness chest pain  Denies any complaints.  Pt uninterested in any conversation, responds with I don't know    PAST MEDICAL & SURGICAL HISTORY:  Psychosis    Allergies  No Known Allergies    MEDICATIONS  (STANDING):  chlorhexidine 4% Liquid 1 Application(s) Topical <User Schedule>  fluPHENAZine 1 milliGRAM(s) Oral two times a day  nicotine - 21 mG/24Hr(s) Patch 1 patch Transdermal daily  traZODone 100 milliGRAM(s) Oral at bedtime    MEDICATIONS  (PRN):    Vital Signs Last 24 Hrs  T(C): 36.9  T(F): 98.5  HR: 79  BP: 138/84  BP(mean): --  RR: 18  SpO2: 97%    O/E:  Awake, alert, not in distress.  HEENT: atraumatic, EOMI.  Chest: clear.  CVS: SIS2 +, no murmur.  P/A: Soft, BS+  CNS: non focal.  Ext: no edema feet.  Skin: no rash, no ulcers.  All systems reviewed positive findings as above.                              15.6   7.12  )-----------( 175      ( 21 Nov 2019 12:18 )             43.8                         15.3   8.59  )-----------( 201      ( 20 Nov 2019 20:00 )             42.6     11-21    141  |  101  |  6<L>  ----------------------------<  106<H>  3.9   |  27  |  0.5<L>  11-20    138  |  96<L>  |  4<L>  ----------------------------<  133<H>  3.1<L>   |  24  |  0.6<L>    Ca    9.3      21 Nov 2019 12:18  Ca    9.5      20 Nov 2019 20:00  Mg     1.9     11-21    TPro  8.2<H>  /  Alb  4.2  /  TBili  0.9  /  DBili  x   /  AST  46<H>  /  ALT  47<H>  /  AlkPhos  68  11-21  TPro  8.7<H>  /  Alb  4.6  /  TBili  1.0  /  DBili  x   /  AST  53<H>  /  ALT  48<H>  /  AlkPhos  75  11-20

## 2019-11-22 NOTE — BEHAVIORAL HEALTH ASSESSMENT NOTE - CASE SUMMARY
Patient denies any depression, anxiety or suicidal ideations and is not exhibiting any overt signs of psychosis. Patient appears to have significant impairment in cognition, possibly secondary to history of polysubstance abuse. Patient presentation is consistent with neurocognitive impairment, and she does not appear that she would be able to perform her ADLs independently.   Considering her history of psychosis based on reports form collateral will recommend to continue the Fluphenazine 1 mg BID.  Can use trazodone for insomnia.   Can consider neurology consult  for further work up of neurocognitive disorder. She might benefit from being place in a long term care facility for dementia. Patient denies any depression, anxiety or suicidal ideations and is not exhibiting any overt signs of psychosis. Patient appears to have significant impairment in cognition, possibly secondary to history of polysubstance abuse. Although the patient's presentation is concerning for atypical bereavement, based on the history obtained this is patient's baseline. This makes it possible neurocognitive impairment (possibly related to remote substance use) and she does not appear that she would be able to perform her ADLs independently.   Considering her history of psychosis based on reports form collateral will recommend to continue the Fluphenazine 1 mg BID.  Can use trazodone for insomnia. Once discharged patient can follow up outpatient for psychiatry.   Can consider neurology consult  for further work up of neurocognitive disorder. She might benefit from being place in a long term care facility for dementia.

## 2019-11-23 LAB
ANION GAP SERPL CALC-SCNC: 15 MMOL/L — HIGH (ref 7–14)
BUN SERPL-MCNC: 9 MG/DL — LOW (ref 10–20)
CALCIUM SERPL-MCNC: 9.4 MG/DL — SIGNIFICANT CHANGE UP (ref 8.5–10.1)
CHLORIDE SERPL-SCNC: 97 MMOL/L — LOW (ref 98–110)
CO2 SERPL-SCNC: 24 MMOL/L — SIGNIFICANT CHANGE UP (ref 17–32)
CREAT SERPL-MCNC: 0.5 MG/DL — LOW (ref 0.7–1.5)
GLUCOSE SERPL-MCNC: 109 MG/DL — HIGH (ref 70–99)
HCT VFR BLD CALC: 43.5 % — SIGNIFICANT CHANGE UP (ref 37–47)
HCV RNA FLD QL NAA+PROBE: SIGNIFICANT CHANGE UP
HCV RNA SPEC QL PROBE+SIG AMP: DETECTED
HGB BLD-MCNC: 15.3 G/DL — SIGNIFICANT CHANGE UP (ref 12–16)
MAGNESIUM SERPL-MCNC: 1.8 MG/DL — SIGNIFICANT CHANGE UP (ref 1.8–2.4)
MCHC RBC-ENTMCNC: 31.3 PG — HIGH (ref 27–31)
MCHC RBC-ENTMCNC: 35.2 G/DL — SIGNIFICANT CHANGE UP (ref 32–37)
MCV RBC AUTO: 89 FL — SIGNIFICANT CHANGE UP (ref 81–99)
NRBC # BLD: 0 /100 WBCS — SIGNIFICANT CHANGE UP (ref 0–0)
PLATELET # BLD AUTO: 192 K/UL — SIGNIFICANT CHANGE UP (ref 130–400)
POTASSIUM SERPL-MCNC: 3.7 MMOL/L — SIGNIFICANT CHANGE UP (ref 3.5–5)
POTASSIUM SERPL-SCNC: 3.7 MMOL/L — SIGNIFICANT CHANGE UP (ref 3.5–5)
RBC # BLD: 4.89 M/UL — SIGNIFICANT CHANGE UP (ref 4.2–5.4)
RBC # FLD: 11.9 % — SIGNIFICANT CHANGE UP (ref 11.5–14.5)
SODIUM SERPL-SCNC: 136 MMOL/L — SIGNIFICANT CHANGE UP (ref 135–146)
WBC # BLD: 7.06 K/UL — SIGNIFICANT CHANGE UP (ref 4.8–10.8)
WBC # FLD AUTO: 7.06 K/UL — SIGNIFICANT CHANGE UP (ref 4.8–10.8)

## 2019-11-23 PROCEDURE — 99232 SBSQ HOSP IP/OBS MODERATE 35: CPT

## 2019-11-23 RX ORDER — NICOTINE POLACRILEX 2 MG
1 GUM BUCCAL DAILY
Refills: 0 | Status: DISCONTINUED | OUTPATIENT
Start: 2019-11-23 | End: 2019-12-12

## 2019-11-23 RX ADMIN — Medication 1 PATCH: at 11:10

## 2019-11-23 RX ADMIN — FLUPHENAZINE HYDROCHLORIDE 1 MILLIGRAM(S): 1 TABLET, FILM COATED ORAL at 17:50

## 2019-11-23 RX ADMIN — FLUPHENAZINE HYDROCHLORIDE 1 MILLIGRAM(S): 1 TABLET, FILM COATED ORAL at 05:12

## 2019-11-23 RX ADMIN — Medication 1 PATCH: at 17:45

## 2019-11-23 RX ADMIN — Medication 100 MILLIGRAM(S): at 21:13

## 2019-11-23 RX ADMIN — Medication 1 PATCH: at 21:14

## 2019-11-23 RX ADMIN — ENOXAPARIN SODIUM 40 MILLIGRAM(S): 100 INJECTION SUBCUTANEOUS at 11:10

## 2019-11-23 NOTE — PROGRESS NOTE ADULT - ASSESSMENT
62 yo female with no known PMH recently  with subsequent IPP admission and relocation to [place] with family after discharge. Patient with unclear psychiatric h/o, discharge dx acute psychosis, and remote h/o polysubstance abuse with no reported suicide attempts. Patient brought in by niece for a higher level of care with the family unable to provide appropriate supervision.  Patient does not know why she was brought to the hospital and denies any concerns. She answers most questions with "I don't know", unable to list her medications, psychiatric diagnosis/hospitalizations, reason for presentation.    # Psychosis with cognitive decline  - H/o IPP admission for acut psychosis  - CT Head No Cont (11.20.19 @ 22:32) >No CT evidence of acute intracranial pathology.  - Vitamin B12, Serum: 719 pg/mL (11.21.19 @ 12:18)  - Treponema Pallidum Antibody Interpretation: Negative(11.21.19 @ 12:18)  - Thyroid Stimulating Hormone, Serum: 0.61 uIU/mL (11.21.19 @ 12:18)  - psych eval: Patient presentation appear consistent with neurocognitive impairment, increasing her risk for unintentional self-harm.she will likely benefit from a higher level of monitoring in the outpatient setting.  - c/w fluphenazine, trazodone  - psych indicates pt needs placemnt in long term care facility due to cognitive impairment     #Pending:  placement in SNF  # no family at bedside na  # Disposition: TBD

## 2019-11-23 NOTE — PROGRESS NOTE ADULT - SUBJECTIVE AND OBJECTIVE BOX
Patient is a 63y old  Female who presents with a chief complaint of Change in mental status (22 Nov 2019 10:00)    Patient was seen and examined.   Denies any complaints.       Vital Signs Last 24 Hrs  T(C): 36.7 (23 Nov 2019 05:26), Max: 36.7 (23 Nov 2019 05:26)  T(F): 98.1 (23 Nov 2019 05:26), Max: 98.1 (23 Nov 2019 05:26)  HR: 77 (23 Nov 2019 05:26) (77 - 81)  BP: 127/59 (23 Nov 2019 05:26) (119/58 - 127/59)  BP(mean): --  RR: 18 (23 Nov 2019 05:26) (18 - 18)  SpO2: 97% (23 Nov 2019 05:26) (97% - 97%)    O/E:  Awake, alert, not in distress.  HEENT: atraumatic, EOMI.  Chest: clear.  CVS: SIS2 +, no murmur.  P/A: Soft, BS+  CNS: non focal.  Ext: no edema feet.  Skin: no rash, no ulcers.  PSYCH:  curled in bed, appears depressed, disheveled      LABS:                                     15.6   7.12  )-----------( 175      ( 21 Nov 2019 12:18 )             43.8     11-21    141  |  101  |  6<L>  ----------------------------<  106<H>  3.9   |  27  |  0.5<L>    Ca    9.3      21 Nov 2019 12:18  Mg     1.9     11-21    TPro  8.2<H>  /  Alb  4.2  /  TBili  0.9  /  DBili  x   /  AST  46<H>  /  ALT  47<H>  /  AlkPhos  68  11-21

## 2019-11-24 PROCEDURE — 99232 SBSQ HOSP IP/OBS MODERATE 35: CPT

## 2019-11-24 RX ADMIN — Medication 1 PATCH: at 07:24

## 2019-11-24 RX ADMIN — FLUPHENAZINE HYDROCHLORIDE 1 MILLIGRAM(S): 1 TABLET, FILM COATED ORAL at 05:35

## 2019-11-24 RX ADMIN — Medication 1 PATCH: at 11:10

## 2019-11-24 RX ADMIN — Medication 1 PATCH: at 11:48

## 2019-11-24 RX ADMIN — FLUPHENAZINE HYDROCHLORIDE 1 MILLIGRAM(S): 1 TABLET, FILM COATED ORAL at 17:12

## 2019-11-24 RX ADMIN — ENOXAPARIN SODIUM 40 MILLIGRAM(S): 100 INJECTION SUBCUTANEOUS at 11:49

## 2019-11-24 RX ADMIN — Medication 100 MILLIGRAM(S): at 21:17

## 2019-11-24 RX ADMIN — Medication 1 PATCH: at 19:24

## 2019-11-24 NOTE — PROGRESS NOTE ADULT - SUBJECTIVE AND OBJECTIVE BOX
BHAVESH LOWERY 63y Female  MRN#: [id number]   CODE STATUS: Full      SUBJECTIVE  Patient is a 63y old Female who presents with a chief complaint of Change in mental status (22 Nov 2019 12:34)  Currently admitted to medicine with the primary diagnosis of Mental status change resolved    Today is hospital day 3d, patient is admitted for placement/depression/unable to take care of her self by niece. This morning she has no new issue, she doesn't like to engage in conversation. she asking for extra clothes, and thinks she is a character from walking dead show        OBJECTIVE  PAST MEDICAL & SURGICAL HISTORY  Psychosis    ALLERGIES:  No Known Allergies    MEDICATIONS:  MEDICATIONS  (STANDING):  chlorhexidine 4% Liquid 1 Application(s) Topical <User Schedule>  enoxaparin Injectable 40 milliGRAM(s) SubCutaneous daily  fluPHENAZine 1 milliGRAM(s) Oral two times a day  nicotine - 21 mG/24Hr(s) Patch 1 patch Transdermal daily  traZODone 100 milliGRAM(s) Oral at bedtime    MEDICATIONS  (PRN):      VITAL SIGNS: Last 24 Hours  Vital Signs Last 24 Hrs  T(C): 36.6 (24 Nov 2019 06:07), Max: 37.1 (23 Nov 2019 14:44)  T(F): 97.9 (24 Nov 2019 06:07), Max: 98.8 (23 Nov 2019 14:44)  HR: 85 (24 Nov 2019 06:07) (74 - 85)  BP: 125/69 (24 Nov 2019 06:07) (115/59 - 125/69)  BP(mean): --  RR: 20 (24 Nov 2019 06:07) (18 - 20)  SpO2: 95% (24 Nov 2019 06:07) (95% - 95%)    PHYSICAL EXAM:  GENERAL: NAD, well-developed, AAOx2-3, difficult to concentrate, making grunting sound  HEENT:  Atraumatic, Normocephalic. EOMI, PERRLA, conjunctiva and sclera clear, No JVD  PULMONARY: Clear to auscultation bilaterally; No wheeze  CARDIOVASCULAR: Regular rate and rhythm; No murmurs, rubs, or gallops  GASTROINTESTINAL: Soft, Nontender, Nondistended; Bowel sounds present  MUSCULOSKELETAL:  2+ Peripheral Pulses, No clubbing, cyanosis, or edema  NEUROLOGY: non-focal  SKIN: No rashes or lesions    LABS:                          15.3   7.06  )-----------( 192      ( 23 Nov 2019 12:06 )             43.5                           15.6   7.12  )-----------( 175      ( 21 Nov 2019 12:18 )             43.8     11-23    136  |  97<L>  |  9<L>  ----------------------------<  109<H>  3.7   |  24  |  0.5<L>    Ca    9.4      23 Nov 2019 12:06  Mg     1.8     11-23 11-21    141  |  101  |  6<L>  ----------------------------<  106<H>  3.9   |  27  |  0.5<L>    Ca    9.3      21 Nov 2019 12:18  Mg     1.9     11-21    TPro  8.2<H>  /  Alb  4.2  /  TBili  0.9  /  DBili  x   /  AST  46<H>  /  ALT  47<H>  /  AlkPhos  68  11-21                  RADIOLOGY:  CT Head No Cont (11.20.19 @ 22:32) >  Impression:     No CT evidence of acute intracranial pathology.

## 2019-11-24 NOTE — PROGRESS NOTE ADULT - ASSESSMENT
64 yo female with no known PMH recently  with subsequent IPP admission and relocation to [place] with family after discharge. Patient with unclear psychiatric h/o, discharge dx acute psychosis, and remote h/o polysubstance abuse with no reported suicide attempts. Patient brought in by niece for a higher level of care with the family unable to provide appropriate supervision.  Patient does not know why she was brought to the hospital and denies any concerns. She answers most questions with "I don't know", unable to list her medications, psychiatric diagnosis/hospitalizations, reason for presentation.    # Psychosis with cognitive decline  - H/o IPP admission for acut psychosis  - CT Head No Cont (11.20.19 @ 22:32) >No CT evidence of acute intracranial pathology.  - Vitamin B12, Serum: 719 pg/mL (11.21.19 @ 12:18)  - Treponema Pallidum Antibody Interpretation: Negative(11.21.19 @ 12:18)  - Thyroid Stimulating Hormone, Serum: 0.61 uIU/mL (11.21.19 @ 12:18)  - psych eval: Patient presentation appear consistent with neurocognitive impairment, increasing her risk for unintentional self-harm.she will likely benefit from a higher level of monitoring in the outpatient setting.  - c/w fluphenazine, trazodone  - psych indicates pt needs placemnt in long term care facility due to cognitive impairment     #Pending:  placement in SNF  # no family at bedside na  # Disposition: TBD

## 2019-11-24 NOTE — PROGRESS NOTE ADULT - ASSESSMENT
64 yo female with no known PMH recently  with subsequent IPP admission in [place] in [place] and relocation to [place] with family after discharge. Patient with unclear psychiatric h/o, discharge dx acute psychosis, and remote h/o polysubstance abuse with no reported suicide attempts. Patient brought in by niece for a higher level of care with the family unable to provide appropriate supervision.  Patient does not know why she was brought to the hospital and denies any concerns. She answers most questions with "I don't know", unable to list her medications, psychiatric diagnosis/hospitalizations, reason for presentation.    #) Cognitive impairment? Acute psychosis secondary to polysubstance abuse?  -  passed away about a month ago, Grievance vs depression along with cognitive impairment   - fluPHENAZine 1 milliGRAM(s) Oral two times a day  - traZODone 100 milliGRAM(s) Oral at bedtime  - patient was admitted for ipp in [place] and was later discharged with the diagnosis of acute psychosis, currently patient has no symptoms  - Niece mentioned patient is unable to take care of herself, and family is unable to care for her, need facility to care for her  - psych consulted two times now: both agreed patient does not need to be in Inpatient psychiatry, but agreed need constant observation hence 1 to 1 due to unintentional risk of harm  - TSH/b12/symphilis negative  - Due to cognitive impairment patient is increased risk for unintentional self-harm.  - Denies suicidal ideation    # smoker  -nicotine - 21 mG/24Hr(s) Patch 1 patch Transdermal daily    # HCV positive  - follow up outpatient?  - follow up lab for HCV    Activity: increase as tolerated  Gi ppx: no need  Dvt ppx: lovenox  Diet: regular  Dispo: pending placement  Code: full

## 2019-11-24 NOTE — PROGRESS NOTE ADULT - SUBJECTIVE AND OBJECTIVE BOX
Patient is a 63y old  Female who presents with a chief complaint of Change in mental status (22 Nov 2019 10:00)    Patient was seen and examined.  patient constantly walks out of her room asking the staff "Am I dying"?       Vital Signs Last 24 Hrs  T(C): 36.6 (24 Nov 2019 06:07), Max: 37.1 (23 Nov 2019 14:44)  T(F): 97.9 (24 Nov 2019 06:07), Max: 98.8 (23 Nov 2019 14:44)  HR: 85 (24 Nov 2019 06:07) (74 - 85)  BP: 125/69 (24 Nov 2019 06:07) (115/59 - 125/69)  BP(mean): --  RR: 20 (24 Nov 2019 06:07) (18 - 20)  SpO2: 95% (24 Nov 2019 06:07) (95% - 95%)      O/E:  Awake, alert, not in distress.  HEENT: atraumatic, EOMI.  Chest: clear.  CVS: SIS2 +, no murmur.  P/A: Soft, BS+  CNS: non focal.  Ext: no edema feet.  Skin: no rash, no ulcers.  PSYCH:  Very anxious affect, disheveled appearance      LABS:                                     15.6   7.12  )-----------( 175      ( 21 Nov 2019 12:18 )             43.8     11-21    141  |  101  |  6<L>  ----------------------------<  106<H>  3.9   |  27  |  0.5<L>    Ca    9.3      21 Nov 2019 12:18  Mg     1.9     11-21    TPro  8.2<H>  /  Alb  4.2  /  TBili  0.9  /  DBili  x   /  AST  46<H>  /  ALT  47<H>  /  AlkPhos  68  11-21

## 2019-11-25 LAB
ALBUMIN SERPL ELPH-MCNC: 4.5 G/DL — SIGNIFICANT CHANGE UP (ref 3.5–5.2)
ALP SERPL-CCNC: 71 U/L — SIGNIFICANT CHANGE UP (ref 30–115)
ALT FLD-CCNC: 51 U/L — HIGH (ref 0–41)
ANION GAP SERPL CALC-SCNC: 16 MMOL/L — HIGH (ref 7–14)
AST SERPL-CCNC: 45 U/L — HIGH (ref 0–41)
BILIRUB SERPL-MCNC: 1 MG/DL — SIGNIFICANT CHANGE UP (ref 0.2–1.2)
BUN SERPL-MCNC: 8 MG/DL — LOW (ref 10–20)
CALCIUM SERPL-MCNC: 9.3 MG/DL — SIGNIFICANT CHANGE UP (ref 8.5–10.1)
CHLORIDE SERPL-SCNC: 99 MMOL/L — SIGNIFICANT CHANGE UP (ref 98–110)
CO2 SERPL-SCNC: 24 MMOL/L — SIGNIFICANT CHANGE UP (ref 17–32)
CREAT SERPL-MCNC: 0.6 MG/DL — LOW (ref 0.7–1.5)
GLUCOSE SERPL-MCNC: 103 MG/DL — HIGH (ref 70–99)
HAV IGM SER-ACNC: ABNORMAL
HBV CORE IGM SER-ACNC: SIGNIFICANT CHANGE UP
HBV SURFACE AG SER-ACNC: SIGNIFICANT CHANGE UP
HCT VFR BLD CALC: 42.5 % — SIGNIFICANT CHANGE UP (ref 37–47)
HCV AB S/CO SERPL IA: 13.02 S/CO — HIGH (ref 0–0.99)
HCV AB SERPL-IMP: REACTIVE
HGB BLD-MCNC: 14.9 G/DL — SIGNIFICANT CHANGE UP (ref 12–16)
MCHC RBC-ENTMCNC: 31.4 PG — HIGH (ref 27–31)
MCHC RBC-ENTMCNC: 35.1 G/DL — SIGNIFICANT CHANGE UP (ref 32–37)
MCV RBC AUTO: 89.5 FL — SIGNIFICANT CHANGE UP (ref 81–99)
NRBC # BLD: 0 /100 WBCS — SIGNIFICANT CHANGE UP (ref 0–0)
PLATELET # BLD AUTO: 212 K/UL — SIGNIFICANT CHANGE UP (ref 130–400)
POTASSIUM SERPL-MCNC: 3.8 MMOL/L — SIGNIFICANT CHANGE UP (ref 3.5–5)
POTASSIUM SERPL-SCNC: 3.8 MMOL/L — SIGNIFICANT CHANGE UP (ref 3.5–5)
PROT SERPL-MCNC: 8.5 G/DL — HIGH (ref 6–8)
RBC # BLD: 4.75 M/UL — SIGNIFICANT CHANGE UP (ref 4.2–5.4)
RBC # FLD: 12.1 % — SIGNIFICANT CHANGE UP (ref 11.5–14.5)
SODIUM SERPL-SCNC: 139 MMOL/L — SIGNIFICANT CHANGE UP (ref 135–146)
WBC # BLD: 7.08 K/UL — SIGNIFICANT CHANGE UP (ref 4.8–10.8)
WBC # FLD AUTO: 7.08 K/UL — SIGNIFICANT CHANGE UP (ref 4.8–10.8)

## 2019-11-25 PROCEDURE — 99232 SBSQ HOSP IP/OBS MODERATE 35: CPT

## 2019-11-25 RX ADMIN — Medication 100 MILLIGRAM(S): at 21:20

## 2019-11-25 RX ADMIN — Medication 1 PATCH: at 21:22

## 2019-11-25 RX ADMIN — CHLORHEXIDINE GLUCONATE 1 APPLICATION(S): 213 SOLUTION TOPICAL at 05:09

## 2019-11-25 RX ADMIN — Medication 1 PATCH: at 11:14

## 2019-11-25 RX ADMIN — FLUPHENAZINE HYDROCHLORIDE 1 MILLIGRAM(S): 1 TABLET, FILM COATED ORAL at 05:09

## 2019-11-25 RX ADMIN — Medication 1 PATCH: at 11:17

## 2019-11-25 RX ADMIN — Medication 1 PATCH: at 07:15

## 2019-11-25 RX ADMIN — ENOXAPARIN SODIUM 40 MILLIGRAM(S): 100 INJECTION SUBCUTANEOUS at 11:14

## 2019-11-25 RX ADMIN — FLUPHENAZINE HYDROCHLORIDE 1 MILLIGRAM(S): 1 TABLET, FILM COATED ORAL at 17:20

## 2019-11-25 NOTE — PROGRESS NOTE ADULT - ASSESSMENT
Patient is not at risk for suicide. However considering her cognitive impairment she is at risk for unintentional self harm. Hence would recommend her to be monitored closely.64 yo female with no known PMH recently  with subsequent IPP admission and relocation to [place] with family after discharge. Patient with unclear psychiatric h/o, discharge dx acute psychosis, and remote h/o polysubstance abuse with no reported suicide attempts. Patient brought in by niece for a higher level of care with the family unable to provide appropriate supervision.  Patient does not know why she was brought to the hospital and denies any concerns. She answers most questions with "I don't know", unable to list her medications, psychiatric diagnosis/hospitalizations, reason for presentation.    # Psychosis with cognitive decline  - H/o IPP admission for acut psychosis  - CT Head No Cont (11.20.19 @ 22:32) >No CT evidence of acute intracranial pathology.  - Vitamin B12, Serum: 719 pg/mL (11.21.19 @ 12:18)  - Treponema Pallidum Antibody Interpretation: Negative(11.21.19 @ 12:18)  - Thyroid Stimulating Hormone, Serum: 0.61 uIU/mL (11.21.19 @ 12:18)  - psych eval: Patient presentation appear consistent with neurocognitive impairment, increasing her risk for unintentional self-harm.she will likely benefit from a higher level of monitoring in the outpatient setting.  - psych F/u: Patient is not at risk for suicide. However considering her cognitive impairment she is at risk for unintentional self harm. Hence would recommend her to be monitored closely. She will benefit from being place in a long term care facility for dementia  - c/w fluphenazine, trazodone    # DVT prophylaxis    #Full code    #Pending: awaiting placement in long term care facility  # no family at bedside . tried to call pt's amador  # Disposition: NH

## 2019-11-25 NOTE — PROGRESS NOTE ADULT - SUBJECTIVE AND OBJECTIVE BOX
Patient is a 63y old  Female who presents with a chief complaint of Change in mental status (22 Nov 2019 10:00)    Patient was seen and examined.  Awaiting placement in long term facility  Denies headache, dizziness chest pain  Denies any complaints.    PAST MEDICAL & SURGICAL HISTORY:  Psychosis    Allergies  No Known Allergies    MEDICATIONS  (STANDING):  chlorhexidine 4% Liquid 1 Application(s) Topical <User Schedule>  enoxaparin Injectable 40 milliGRAM(s) SubCutaneous daily  fluPHENAZine 1 milliGRAM(s) Oral two times a day  nicotine - 21 mG/24Hr(s) Patch 1 patch Transdermal daily  traZODone 100 milliGRAM(s) Oral at bedtime    MEDICATIONS  (PRN):    Vital Signs Last 24 Hrs  T(C): 36.3 (25 Nov 2019 05:50), Max: 36.5 (24 Nov 2019 22:30)  T(F): 97.3 (25 Nov 2019 05:50), Max: 97.7 (24 Nov 2019 22:30)  HR: 95 (25 Nov 2019 05:50) (68 - 95)  BP: 120/75 (25 Nov 2019 05:50) (120/75 - 124/63)  RR: 18 (25 Nov 2019 05:50) (18 - 18)  SpO2: 100% (25 Nov 2019 05:50) (100% - 100%)    O/E:  Awake, alert, not in distress.  HEENT: atraumatic, EOMI.  Chest: clear.  CVS: SIS2 +, no murmur.  P/A: Soft, BS+  CNS: non focal.  Ext: no edema feet.  Skin: no rash, no ulcers.  All systems reviewed positive findings as above.                               14.9   7.08  )-----------( 212      ( 25 Nov 2019 06:34 )             42.5   11-25    139  |  99  |  8<L>  ----------------------------<  103<H>  3.8   |  24  |  0.6<L>    Ca    9.3      25 Nov 2019 06:34    TPro  8.5<H>  /  Alb  4.5  /  TBili  1.0  /  DBili  x   /  AST  45<H>  /  ALT  51<H>  /  AlkPhos  71  11-25

## 2019-11-25 NOTE — PROGRESS NOTE ADULT - SUBJECTIVE AND OBJECTIVE BOX
BHAVESH LOWERY 63y Female  MRN#: [id number]   CODE STATUS: Full      SUBJECTIVE  Patient is a 63y old Female who presents with a chief complaint of Change in mental status (22 Nov 2019 12:34)  Currently admitted to medicine with the primary diagnosis of Mental status change resolved    Today is hospital day 4, patient is admitted for placement/depression/unable to take care of her self by niece. This morning she tried to swallow the whole shrimp without chewing and ended up choking on it, she coughed it out, her diet from regular was changed to mechanical soft diet. patient repeatedly asks for  food, blood draws, change tv, and if she going to die, doesn't follow commands very well      OBJECTIVE  PAST MEDICAL & SURGICAL HISTORY  Psychosis    ALLERGIES:  No Known Allergies    MEDICATIONS:  MEDICATIONS  (STANDING):  chlorhexidine 4% Liquid 1 Application(s) Topical <User Schedule>  enoxaparin Injectable 40 milliGRAM(s) SubCutaneous daily  fluPHENAZine 1 milliGRAM(s) Oral two times a day  nicotine - 21 mG/24Hr(s) Patch 1 patch Transdermal daily  traZODone 100 milliGRAM(s) Oral at bedtime    MEDICATIONS  (PRN):      VITAL SIGNS: Last 24 Hours  Vital Signs Last 24 Hrs  T(C): 36.3 (25 Nov 2019 05:50), Max: 36.5 (24 Nov 2019 22:30)  T(F): 97.3 (25 Nov 2019 05:50), Max: 97.7 (24 Nov 2019 22:30)  HR: 95 (25 Nov 2019 05:50) (68 - 95)  BP: 120/75 (25 Nov 2019 05:50) (120/75 - 124/63)  BP(mean): --  RR: 18 (25 Nov 2019 05:50) (18 - 18)  SpO2: 100% (25 Nov 2019 05:50) (100% - 100%)  PHYSICAL EXAM:  GENERAL: NAD, well-developed, AAOx1-2, difficult to concentrate, making grunting sound, inappropriate answers  HEENT:  Atraumatic, Normocephalic.   PULMONARY: Clear to auscultation bilaterally; No wheeze  CARDIOVASCULAR: Regular rate and rhythm; No murmurs, rubs, or gallops  GASTROINTESTINAL: Soft, Nontender, Nondistended; Bowel sounds present  MUSCULOSKELETAL:  2+ Peripheral Pulses, No clubbing, cyanosis, or edema  NEUROLOGY: non-focal  SKIN: No rashes or lesions    LABS:                          14.9   7.08  )-----------( 212      ( 25 Nov 2019 06:34 )             42.5                           15.3   7.06  )-----------( 192      ( 23 Nov 2019 12:06 )             43.5                           15.6   7.12  )-----------( 175      ( 21 Nov 2019 12:18 )             43.8     11-25    139  |  99  |  8<L>  ----------------------------<  103<H>  3.8   |  24  |  0.6<L>    Ca    9.3      25 Nov 2019 06:34    TPro  8.5<H>  /  Alb  4.5  /  TBili  1.0  /  DBili  x   /  AST  45<H>  /  ALT  51<H>  /  AlkPhos  71  11-25 11-23    136  |  97<L>  |  9<L>  ----------------------------<  109<H>  3.7   |  24  |  0.5<L>    Ca    9.4      23 Nov 2019 12:06  Mg     1.8     11-23 11-21    141  |  101  |  6<L>  ----------------------------<  106<H>  3.9   |  27  |  0.5<L>    Ca    9.3      21 Nov 2019 12:18  Mg     1.9     11-21    TPro  8.2<H>  /  Alb  4.2  /  TBili  0.9  /  DBili  x   /  AST  46<H>  /  ALT  47<H>  /  AlkPhos  68  11-21                  RADIOLOGY:  CT Head No Cont (11.20.19 @ 22:32) >  Impression:     No CT evidence of acute intracranial pathology.

## 2019-11-26 PROCEDURE — 99231 SBSQ HOSP IP/OBS SF/LOW 25: CPT

## 2019-11-26 RX ADMIN — FLUPHENAZINE HYDROCHLORIDE 1 MILLIGRAM(S): 1 TABLET, FILM COATED ORAL at 05:27

## 2019-11-26 RX ADMIN — Medication 1 PATCH: at 21:41

## 2019-11-26 RX ADMIN — Medication 1 PATCH: at 12:10

## 2019-11-26 RX ADMIN — FLUPHENAZINE HYDROCHLORIDE 1 MILLIGRAM(S): 1 TABLET, FILM COATED ORAL at 19:17

## 2019-11-26 RX ADMIN — Medication 100 MILLIGRAM(S): at 21:40

## 2019-11-26 RX ADMIN — CHLORHEXIDINE GLUCONATE 1 APPLICATION(S): 213 SOLUTION TOPICAL at 05:26

## 2019-11-26 NOTE — PROGRESS NOTE ADULT - ASSESSMENT
Patient is not at risk for suicide. However considering her cognitive impairment she is at risk for unintentional self harm. Hence would recommend her to be monitored closely.64 yo female with no known PMH recently  with subsequent IPP admission and relocation to [place] with family after discharge. Patient with unclear psychiatric h/o, discharge dx acute psychosis, and remote h/o polysubstance abuse with no reported suicide attempts. Patient brought in by niece for a higher level of care with the family unable to provide appropriate supervision.  Patient does not know why she was brought to the hospital and denies any concerns. She answers most questions with "I don't know", unable to list her medications, psychiatric diagnosis/hospitalizations, reason for presentation.    # Psychosis with cognitive decline  - H/o IPP admission for acut psychosis  - CT Head No Cont (11.20.19 @ 22:32) >No CT evidence of acute intracranial pathology.  - Vitamin B12, Serum: 719 pg/mL (11.21.19 @ 12:18)  - Treponema Pallidum Antibody Interpretation: Negative(11.21.19 @ 12:18)  - Thyroid Stimulating Hormone, Serum: 0.61 uIU/mL (11.21.19 @ 12:18)  - psych eval: Patient presentation appear consistent with neurocognitive impairment, increasing her risk for unintentional self-harm.she will likely benefit from a higher level of monitoring in the outpatient setting.  - psych F/u: Patient is not at risk for suicide. However considering her cognitive impairment she is at risk for unintentional self harm. Hence would recommend her to be monitored closely. She will benefit from being place in a long term care facility for dementia  - c/w fluphenazine, trazodone    # DVT prophylaxis    #Full code    #Pending: awaiting placement in long term care facility  # no family at bedside.  # Disposition: NH

## 2019-11-26 NOTE — PROGRESS NOTE ADULT - SUBJECTIVE AND OBJECTIVE BOX
Patient is a 63y old  Female who presents with a chief complaint of Change in mental status (22 Nov 2019 10:00)    Patient was seen and examined.  Awaiting placement in long term facility.  yesterday Pt choked on whole shrimp--> heimlich maneuver was attempted and pt coughed the whole shrimp.  Today denies any complaints.    PAST MEDICAL & SURGICAL HISTORY:  Psychosis    Allergies  No Known Allergies    MEDICATIONS  (STANDING):  chlorhexidine 4% Liquid 1 Application(s) Topical <User Schedule>  enoxaparin Injectable 40 milliGRAM(s) SubCutaneous daily  fluPHENAZine 1 milliGRAM(s) Oral two times a day  nicotine - 21 mG/24Hr(s) Patch 1 patch Transdermal daily  traZODone 100 milliGRAM(s) Oral at bedtime    MEDICATIONS  (PRN):    T(C): 36.6 (11-26-19 @ 05:34), Max: 36.6 (11-26-19 @ 05:34)  HR: 77 (11-26-19 @ 05:34) (74 - 81)  BP: 137/61 (11-26-19 @ 05:34) (128/71 - 137/61)  RR: 18 (11-26-19 @ 05:34) (18 - 18)  SpO2: 97% (11-26-19 @ 05:34) (97% - 97%)    O/E:  Awake, alert, not in distress.  HEENT: atraumatic, EOMI.  Chest: clear.  CVS: SIS2 +, no murmur.  P/A: Soft, BS+  CNS: non focal.  Ext: no edema feet.  Skin: no rash, no ulcers.  All systems reviewed positive findings as above.                            14.9   7.08  )-----------( 212      ( 25 Nov 2019 06:34 )             42.5   11-25    139  |  99  |  8<L>  ----------------------------<  103<H>  3.8   |  24  |  0.6<L>    Ca    9.3      25 Nov 2019 06:34    TPro  8.5<H>  /  Alb  4.5  /  TBili  1.0  /  DBili  x   /  AST  45<H>  /  ALT  51<H>  /  AlkPhos  71  11-25

## 2019-11-26 NOTE — PROGRESS NOTE ADULT - ASSESSMENT
64 yo female with no known PMH recently  with subsequent IPP admission in [place] in [place] and relocation to [place] with family after discharge. Patient with unclear psychiatric h/o, discharge dx acute psychosis, and remote h/o polysubstance abuse with no reported suicide attempts. Patient brought in by niece for a higher level of care with the family unable to provide appropriate supervision.  Patient does not know why she was brought to the hospital and denies any concerns. She answers most questions with "I don't know", unable to list her medications, psychiatric diagnosis/hospitalizations, reason for presentation.    #) Cognitive impairment? Acute psychosis secondary to polysubstance abuse?  -  passed away about a month ago, Grievance vs depression along with cognitive impairment   - fluPHENAZine   - traZODone   - patient was admitted for ipp in [place] and was later discharged with the diagnosis of acute psychosis, currently patient has no symptoms  - Niece mentioned patient is unable to take care of herself, and family is unable to care for her, need facility to care for her  - psych consulted two times now: both agreed patient does not need to be in Inpatient psychiatry, but agreed need constant observation hence 1 to 1 due to unintentional risk of harm  - TSH/b12/symphilis negative  - Due to cognitive impairment patient is increased risk for unintentional self-harm.  - Denies suicidal ideation    # smoker  -nicotine - 21 mG/24Hr(s) Patch 1 patch Transdermal daily    # HCV positive (negative)  - appears to have been treated in the past  - patient/family does not know  - follow up outpatient?  - follow up lab for HCV    Activity: increase as tolerated  Gi ppx: no need  Dvt ppx: lovenox  Diet: mechanical soft diet  Dispo: pending placement ([facility] coming to evaluate the patient), pending  ( [place], then [place] can get insurance in [place], case manger working on it)  Code: full

## 2019-11-26 NOTE — PROGRESS NOTE ADULT - SUBJECTIVE AND OBJECTIVE BOX
BHAVESH LOWERY 63y Female  MRN#: [id number]   CODE STATUS: Full      SUBJECTIVE  Patient is a 63y old Female who presents with a chief complaint of Change in mental status (22 Nov 2019 12:34)  Currently admitted to medicine with the primary diagnosis of Mental status change resolved    Today is hospital day 5, patient is admitted for placement/depression/unable to take care of her self by niece. Yesterday patient tried to choke on shrimp,  working on placement.    OBJECTIVE  PAST MEDICAL & SURGICAL HISTORY  Psychosis    ALLERGIES:  No Known Allergies    MEDICATIONS:  MEDICATIONS  (STANDING):  chlorhexidine 4% Liquid 1 Application(s) Topical <User Schedule>  enoxaparin Injectable 40 milliGRAM(s) SubCutaneous daily  fluPHENAZine 1 milliGRAM(s) Oral two times a day  nicotine - 21 mG/24Hr(s) Patch 1 patch Transdermal daily  traZODone 100 milliGRAM(s) Oral at bedtime    MEDICATIONS  (PRN):      VITAL SIGNS: Last 24 Hours  Vital Signs Last 24 Hrs  T(C): 35.6 (26 Nov 2019 12:29), Max: 36.6 (26 Nov 2019 05:34)  T(F): 96 (26 Nov 2019 12:29), Max: 97.9 (26 Nov 2019 05:34)  HR: 103 (26 Nov 2019 12:29) (74 - 103)  BP: 112/68 (26 Nov 2019 12:29) (112/68 - 137/61)  BP(mean): 94 (25 Nov 2019 21:43) (94 - 94)  RR: 19 (26 Nov 2019 12:29) (18 - 19)  SpO2: 97% (26 Nov 2019 05:34) (97% - 97%)  PHYSICAL EXAM:  GENERAL: NAD, well-developed, AAOx1-2, unable to concentrate, making grunting sound, inappropriate answers  HEENT:  Atraumatic, Normocephalic.   PULMONARY: Clear to auscultation bilaterally; No wheeze  CARDIOVASCULAR: Regular rate and rhythm; No murmurs, rubs, or gallops  GASTROINTESTINAL: Soft, Nontender, Nondistended; Bowel sounds present  MUSCULOSKELETAL:  2+ Peripheral Pulses, No clubbing, cyanosis, or edema  NEUROLOGY: non-focal  SKIN: No rashes or lesions    LABS:                                       14.9   7.08  )-----------( 212      ( 25 Nov 2019 06:34 )             42.5                           15.3   7.06  )-----------( 192      ( 23 Nov 2019 12:06 )             43.5                           15.6   7.12  )-----------( 175      ( 21 Nov 2019 12:18 )             43.8     11-25    139  |  99  |  8<L>  ----------------------------<  103<H>  3.8   |  24  |  0.6<L>    Ca    9.3      25 Nov 2019 06:34    TPro  8.5<H>  /  Alb  4.5  /  TBili  1.0  /  DBili  x   /  AST  45<H>  /  ALT  51<H>  /  AlkPhos  71  11-25 11-23    136  |  97<L>  |  9<L>  ----------------------------<  109<H>  3.7   |  24  |  0.5<L>    Ca    9.4      23 Nov 2019 12:06  Mg     1.8     11-23 11-21    141  |  101  |  6<L>  ----------------------------<  106<H>  3.9   |  27  |  0.5<L>    Ca    9.3      21 Nov 2019 12:18  Mg     1.9     11-21    TPro  8.2<H>  /  Alb  4.2  /  TBili  0.9  /  DBili  x   /  AST  46<H>  /  ALT  47<H>  /  AlkPhos  68  11-21                  RADIOLOGY:  CT Head No Cont (11.20.19 @ 22:32) >  Impression:     No CT evidence of acute intracranial pathology.

## 2019-11-27 PROCEDURE — 99231 SBSQ HOSP IP/OBS SF/LOW 25: CPT

## 2019-11-27 RX ADMIN — FLUPHENAZINE HYDROCHLORIDE 1 MILLIGRAM(S): 1 TABLET, FILM COATED ORAL at 17:00

## 2019-11-27 RX ADMIN — Medication 1 PATCH: at 11:03

## 2019-11-27 RX ADMIN — FLUPHENAZINE HYDROCHLORIDE 1 MILLIGRAM(S): 1 TABLET, FILM COATED ORAL at 05:24

## 2019-11-27 RX ADMIN — ENOXAPARIN SODIUM 40 MILLIGRAM(S): 100 INJECTION SUBCUTANEOUS at 11:03

## 2019-11-27 RX ADMIN — CHLORHEXIDINE GLUCONATE 1 APPLICATION(S): 213 SOLUTION TOPICAL at 05:24

## 2019-11-27 RX ADMIN — Medication 100 MILLIGRAM(S): at 21:26

## 2019-11-27 RX ADMIN — Medication 1 PATCH: at 21:32

## 2019-11-27 NOTE — PROGRESS NOTE ADULT - ASSESSMENT
Patient is not at risk for suicide. However considering her cognitive impairment she is at risk for unintentional self harm. Hence would recommend her to be monitored closely.64 yo female with no known PMH recently  with subsequent IPP admission and relocation to [place] with family after discharge. Patient with unclear psychiatric h/o, discharge dx acute psychosis, and remote h/o polysubstance abuse with no reported suicide attempts. Patient brought in by niece for a higher level of care with the family unable to provide appropriate supervision.  Patient does not know why she was brought to the hospital and denies any concerns. She answers most questions with "I don't know", unable to list her medications, psychiatric diagnosis/hospitalizations, reason for presentation.    # Psychosis with cognitive decline  - H/o IPP admission for acut psychosis  - CT Head No Cont (11.20.19 @ 22:32) >No CT evidence of acute intracranial pathology.  - Vitamin B12, Serum: 719 pg/mL (11.21.19 @ 12:18)  - Treponema Pallidum Antibody Interpretation: Negative(11.21.19 @ 12:18)  - Thyroid Stimulating Hormone, Serum: 0.61 uIU/mL (11.21.19 @ 12:18)  - psych eval: Patient presentation appear consistent with neurocognitive impairment, increasing her risk for unintentional self-harm.she will likely benefit from a higher level of monitoring in the outpatient setting.  - psych F/u: Patient is not at risk for suicide. However considering her cognitive impairment she is at risk for unintentional self harm. Hence would recommend her to be monitored closely. She will benefit from being place in a long term care facility for dementia  - c/w fluphenazine, trazodone    # DVT prophylaxis    #Full code    #Pending: awaiting placement in long term care facility  # no family at bedside. left a message for amador to call back for update  # Disposition: NH

## 2019-11-27 NOTE — PROGRESS NOTE ADULT - ASSESSMENT
64 yo female with no known PMH recently  with subsequent IPP admission in [place] in [place] and relocation to [place] with family after discharge. Patient with unclear psychiatric h/o, discharge dx acute psychosis, and remote h/o polysubstance abuse with no reported suicide attempts. Patient brought in by niece for a higher level of care with the family unable to provide appropriate supervision.  Patient does not know why she was brought to the hospital and denies any concerns. She answers most questions with "I don't know", unable to list her medications, psychiatric diagnosis/hospitalizations, reason for presentation.    #) Cognitive impairment? Acute psychosis secondary to polysubstance abuse?  -  passed away about a month ago, Grievance vs depression along with cognitive impairment   - fluPHENAZine   - traZODone   - patient was admitted for ipp in [place] and was later discharged with the diagnosis of acute psychosis, currently patient has no symptoms  - Niece mentioned patient is unable to take care of herself, and family is unable to care for her, need facility to care for her  - psych consulted two times now: both agreed patient does not need to be in Inpatient psychiatry, but agreed need constant observation hence 1 to 1 due to unintentional risk of harm  - TSH/b12/symphilis negative  - Due to cognitive impairment patient is increased risk for unintentional self-harm.  - Denies suicidal ideation    # smoker  -nicotine - 21 mG/24Hr(s) Patch 1 patch Transdermal daily    # HCV positive (negative)  - appears to have been treated in the past  - patient/family does not know  - follow up outpatient?  - follow up lab for HCV    Activity: increase as tolerated  Gi ppx: no need  Dvt ppx: lovenox  Diet: mechanical soft diet  Dispo: pending placement ([facility] coming to evaluate the patient), pending  ( [place], then [place] can get insurance in [place], case manger working on it)  Code: full

## 2019-11-27 NOTE — PROGRESS NOTE ADULT - SUBJECTIVE AND OBJECTIVE BOX
BHAVESH LOWERY 63y Female  MRN#: [id number]   CODE STATUS: Full      SUBJECTIVE  Patient is a 63y old Female who presents with a chief complaint of Change in mental status (22 Nov 2019 12:34)  Currently admitted to medicine with the primary diagnosis of Mental status change resolved    Today is hospital day 6, patient is admitted for placement/depression/unable to take care of her self by niece.  working on placement. patient asks everyone " Am I dying" , "Can I go home"    OBJECTIVE  PAST MEDICAL & SURGICAL HISTORY  Psychosis    ALLERGIES:  No Known Allergies    MEDICATIONS:  MEDICATIONS  (STANDING):  chlorhexidine 4% Liquid 1 Application(s) Topical <User Schedule>  enoxaparin Injectable 40 milliGRAM(s) SubCutaneous daily  fluPHENAZine 1 milliGRAM(s) Oral two times a day  nicotine - 21 mG/24Hr(s) Patch 1 patch Transdermal daily  traZODone 100 milliGRAM(s) Oral at bedtime    MEDICATIONS  (PRN):      VITAL SIGNS: Last 24 Hours  Vital Signs Last 24 Hrs  T(C): 35.9 (27 Nov 2019 06:37), Max: 36.5 (26 Nov 2019 21:59)  T(F): 96.7 (27 Nov 2019 06:37), Max: 97.7 (26 Nov 2019 21:59)  HR: 78 (27 Nov 2019 06:37) (68 - 78)  BP: 121/65 (27 Nov 2019 06:37) (117/58 - 121/65)  BP(mean): 82 (26 Nov 2019 21:59) (82 - 82)  RR: 18 (27 Nov 2019 06:37) (18 - 18)  SpO2: 97% (27 Nov 2019 06:37) (97% - 97%)  PHYSICAL EXAM:  GENERAL: NAD, well-developed, AAOx1-2, unable to concentrate, making grunting sound, inappropriate answers  HEENT:  Atraumatic, Normocephalic.   PULMONARY: Clear to auscultation bilaterally; No wheeze  CARDIOVASCULAR: Regular rate and rhythm; No murmurs, rubs, or gallops  GASTROINTESTINAL: Soft, Nontender, Nondistended; Bowel sounds present  MUSCULOSKELETAL:  2+ Peripheral Pulses, No clubbing, cyanosis, or edema  NEUROLOGY: non-focal  SKIN: No rashes or lesions    LABS:                                       14.9   7.08  )-----------( 212      ( 25 Nov 2019 06:34 )             42.5                           15.3   7.06  )-----------( 192      ( 23 Nov 2019 12:06 )             43.5                           15.6   7.12  )-----------( 175      ( 21 Nov 2019 12:18 )             43.8     11-25    139  |  99  |  8<L>  ----------------------------<  103<H>  3.8   |  24  |  0.6<L>    Ca    9.3      25 Nov 2019 06:34    TPro  8.5<H>  /  Alb  4.5  /  TBili  1.0  /  DBili  x   /  AST  45<H>  /  ALT  51<H>  /  AlkPhos  71  11-25 11-23    136  |  97<L>  |  9<L>  ----------------------------<  109<H>  3.7   |  24  |  0.5<L>    Ca    9.4      23 Nov 2019 12:06  Mg     1.8     11-23 11-21    141  |  101  |  6<L>  ----------------------------<  106<H>  3.9   |  27  |  0.5<L>    Ca    9.3      21 Nov 2019 12:18  Mg     1.9     11-21    TPro  8.2<H>  /  Alb  4.2  /  TBili  0.9  /  DBili  x   /  AST  46<H>  /  ALT  47<H>  /  AlkPhos  68  11-21                  RADIOLOGY:  CT Head No Cont (11.20.19 @ 22:32) >  Impression:     No CT evidence of acute intracranial pathology.

## 2019-11-27 NOTE — PROGRESS NOTE ADULT - SUBJECTIVE AND OBJECTIVE BOX
Patient is a 63y old  Female who presents with a chief complaint of Change in mental status (22 Nov 2019 10:00)    Patient was seen and examined.  Awaiting placement in long term facility.  Today denies any complaints.  no overnight events    PAST MEDICAL & SURGICAL HISTORY:  Psychosis    Allergies  No Known Allergies    MEDICATIONS  (STANDING):  chlorhexidine 4% Liquid 1 Application(s) Topical <User Schedule>  enoxaparin Injectable 40 milliGRAM(s) SubCutaneous daily  fluPHENAZine 1 milliGRAM(s) Oral two times a day  nicotine - 21 mG/24Hr(s) Patch 1 patch Transdermal daily  traZODone 100 milliGRAM(s) Oral at bedtime    MEDICATIONS  (PRN):    T(C): 35.9 (11-27-19 @ 06:37), Max: 36.5 (11-26-19 @ 21:59)  HR: 78 (11-27-19 @ 06:37) (68 - 78)  BP: 121/65 (11-27-19 @ 06:37) (117/58 - 121/65)  RR: 18 (11-27-19 @ 06:37) (18 - 18)  SpO2: 97% (11-27-19 @ 06:37) (97% - 97%)    O/E:  Awake, alert, not in distress.  HEENT: atraumatic, EOMI.  Chest: clear.  CVS: SIS2 +, no murmur.  P/A: Soft, BS+  CNS: non focal.  Ext: no edema feet.  Skin: no rash, no ulcers.  All systems reviewed positive findings as above.

## 2019-11-28 PROCEDURE — 99231 SBSQ HOSP IP/OBS SF/LOW 25: CPT

## 2019-11-28 RX ADMIN — FLUPHENAZINE HYDROCHLORIDE 1 MILLIGRAM(S): 1 TABLET, FILM COATED ORAL at 17:26

## 2019-11-28 RX ADMIN — Medication 1 PATCH: at 12:04

## 2019-11-28 RX ADMIN — ENOXAPARIN SODIUM 40 MILLIGRAM(S): 100 INJECTION SUBCUTANEOUS at 12:04

## 2019-11-28 RX ADMIN — Medication 100 MILLIGRAM(S): at 21:20

## 2019-11-28 RX ADMIN — Medication 1 PATCH: at 12:05

## 2019-11-28 RX ADMIN — Medication 1 PATCH: at 08:10

## 2019-11-28 RX ADMIN — FLUPHENAZINE HYDROCHLORIDE 1 MILLIGRAM(S): 1 TABLET, FILM COATED ORAL at 06:34

## 2019-11-28 NOTE — DIETITIAN INITIAL EVALUATION ADULT. - FEEDING SKILL
Spoke to pt family member at bedside. Reports pt with adequate appetite and consumes 3 meals/day regularly. NKFA. Denies use of nutrition supplements or following specific diet PTA.

## 2019-11-28 NOTE — PROGRESS NOTE ADULT - SUBJECTIVE AND OBJECTIVE BOX
Patient is a 63y old  Female who presents with a chief complaint of Change in mental status (22 Nov 2019 10:00)    Patient was seen and examined.  Awaiting placement in long term facility.  Today denies any complaints.  no overnight events    PAST MEDICAL & SURGICAL HISTORY:  Psychosis    Allergies  No Known Allergies    MEDICATIONS  (STANDING):  chlorhexidine 4% Liquid 1 Application(s) Topical <User Schedule>  enoxaparin Injectable 40 milliGRAM(s) SubCutaneous daily  fluPHENAZine 1 milliGRAM(s) Oral two times a day  nicotine - 21 mG/24Hr(s) Patch 1 patch Transdermal daily  traZODone 100 milliGRAM(s) Oral at bedtime    MEDICATIONS  (PRN):    T(C): 37.1 (11-28-19 @ 06:09), Max: 37.1 (11-28-19 @ 06:09)  HR: 99 (11-28-19 @ 06:09) (84 - 99)  BP: 123/68 (11-28-19 @ 06:09) (123/68 - 150/78)  RR: 20 (11-28-19 @ 06:09) (18 - 20)  SpO2: 97% (11-28-19 @ 06:09) (97% - 97%)    O/E:  Awake, alert, not in distress.  HEENT: atraumatic, EOMI.  Chest: clear.  CVS: SIS2 +, no murmur.  P/A: Soft, BS+  CNS: non focal.  Ext: no edema feet.  Skin: no rash, no ulcers.  All systems reviewed positive findings as above.

## 2019-11-28 NOTE — DIETITIAN INITIAL EVALUATION ADULT. - ENERGY NEEDS
estimated calorie needs = 0507-9673 kcal/day (25-30 kcal/kg CBW).   estimated protein needs = 58-70 g/day (1.0-1.2 g/kg CBW).   estimated fluid needs = 1mL/kcal or per LIP

## 2019-11-28 NOTE — PROGRESS NOTE ADULT - ASSESSMENT
Patient is not at risk for suicide. However considering her cognitive impairment she is at risk for unintentional self harm. Hence would recommend her to be monitored closely.64 yo female with no known PMH recently  with subsequent IPP admission and relocation to [place] with family after discharge. Patient with unclear psychiatric h/o, discharge dx acute psychosis, and remote h/o polysubstance abuse with no reported suicide attempts. Patient brought in by niece for a higher level of care with the family unable to provide appropriate supervision.  Patient does not know why she was brought to the hospital and denies any concerns. She answers most questions with "I don't know", unable to list her medications, psychiatric diagnosis/hospitalizations, reason for presentation.    # Psychosis with cognitive decline  - H/o IPP admission for acut psychosis  - CT Head No Cont (11.20.19 @ 22:32) >No CT evidence of acute intracranial pathology.  - Vitamin B12, Serum: 719 pg/mL (11.21.19 @ 12:18)  - Treponema Pallidum Antibody Interpretation: Negative(11.21.19 @ 12:18)  - Thyroid Stimulating Hormone, Serum: 0.61 uIU/mL (11.21.19 @ 12:18)  - psych eval: Patient presentation appear consistent with neurocognitive impairment, increasing her risk for unintentional self-harm.she will likely benefit from a higher level of monitoring in the outpatient setting.  - psych F/u: Patient is not at risk for suicide. However considering her cognitive impairment she is at risk for unintentional self harm. Hence would recommend her to be monitored closely. She will benefit from being place in a long term care facility for dementia  - c/w fluphenazine, trazodone    # DVT prophylaxis    #Full code    #Pending: awaiting placement in long term care facility  # no family at bedside. left a message for amador to call back for update  # Disposition: NH

## 2019-11-28 NOTE — DIETITIAN INITIAL EVALUATION ADULT. - OTHER INFO
Pt p/w change in mental status and agitation. Primary dx: Psychosis with cognitive decline, psych following. Awaiting placement in long term care facility

## 2019-11-28 NOTE — DIETITIAN INITIAL EVALUATION ADULT. - ADD RECOMMEND
Pt to continue to consume 75% or more of meals throughout LOS. Reassess in 7 days. RD will monitor diet order, energy intake, nutrition related labs, body composition, NFPF

## 2019-11-29 PROCEDURE — 99231 SBSQ HOSP IP/OBS SF/LOW 25: CPT

## 2019-11-29 RX ADMIN — Medication 1 PATCH: at 17:34

## 2019-11-29 RX ADMIN — FLUPHENAZINE HYDROCHLORIDE 1 MILLIGRAM(S): 1 TABLET, FILM COATED ORAL at 06:17

## 2019-11-29 RX ADMIN — Medication 1 PATCH: at 11:16

## 2019-11-29 RX ADMIN — FLUPHENAZINE HYDROCHLORIDE 1 MILLIGRAM(S): 1 TABLET, FILM COATED ORAL at 17:36

## 2019-11-29 RX ADMIN — ENOXAPARIN SODIUM 40 MILLIGRAM(S): 100 INJECTION SUBCUTANEOUS at 11:16

## 2019-11-29 RX ADMIN — Medication 1 PATCH: at 11:17

## 2019-11-29 RX ADMIN — Medication 100 MILLIGRAM(S): at 21:01

## 2019-11-29 RX ADMIN — Medication 1 PATCH: at 07:36

## 2019-11-29 NOTE — PROGRESS NOTE ADULT - ASSESSMENT
62 yo female with no known PMH recently  with subsequent IPP admission in [facility] in [place] and relocation to [place] with family after discharge. Patient with unclear psychiatric h/o, discharge dx acute psychosis, and remote h/o polysubstance abuse with no reported suicide attempts. Patient brought in by niece for a higher level of care with the family unable to provide appropriate supervision.  Patient does not know why she was brought to the hospital and denies any concerns. She answers most questions with "I don't know", unable to list her medications, psychiatric diagnosis/hospitalizations, reason for presentation.    #) Cognitive impairment? Acute psychosis secondary to polysubstance abuse?  -  passed away about a month ago, Grievance vs depression along with cognitive impairment   - fluPHENAZine   - traZODone   - patient was admitted for ipp in [place] and was later discharged with the diagnosis of acute psychosis, currently patient has no symptoms  - Niece mentioned patient is unable to take care of herself, and family is unable to care for her, need facility to care for her  - psych consulted two times now: both agreed patient does not need to be in Inpatient psychiatry, but agreed need constant observation hence 1 to 1 due to unintentional risk of harm  - TSH/b12/symphilis negative  - Due to cognitive impairment patient is increased risk for unintentional self-harm.  - Denies suicidal ideation    # smoker  -nicotine - 21 mG/24Hr(s) Patch 1 patch Transdermal daily    # HCV positive (negative)  - appears to have been treated in the past  - patient/family does not know      Activity: increase as tolerated  Gi ppx: no need  Dvt ppx: lovenox  Diet: mechanical soft diet  Dispo: pending placement ([facility] coming to evaluate the patient), pending  ( [place], then [place] can get insurance in [place], case manger working on it)  Code: full

## 2019-11-29 NOTE — PROGRESS NOTE ADULT - ASSESSMENT
Patient is not at risk for suicide. However considering her cognitive impairment she is at risk for unintentional self harm. Hence would recommend her to be monitored closely.62 yo female with no known PMH recently  with subsequent IPP admission and relocation to [place] with family after discharge. Patient with unclear psychiatric h/o, discharge dx acute psychosis, and remote h/o polysubstance abuse with no reported suicide attempts. Patient brought in by niece for a higher level of care with the family unable to provide appropriate supervision.  Patient does not know why she was brought to the hospital and denies any concerns. She answers most questions with "I don't know", unable to list her medications, psychiatric diagnosis/hospitalizations, reason for presentation.    # Psychosis with cognitive decline  - H/o IPP admission for acut psychosis  - CT Head No Cont (11.20.19 @ 22:32) >No CT evidence of acute intracranial pathology.  - Vitamin B12, Serum: 719 pg/mL (11.21.19 @ 12:18)  - Treponema Pallidum Antibody Interpretation: Negative(11.21.19 @ 12:18)  - Thyroid Stimulating Hormone, Serum: 0.61 uIU/mL (11.21.19 @ 12:18)  - psych eval: Patient presentation appear consistent with neurocognitive impairment, increasing her risk for unintentional self-harm.she will likely benefit from a higher level of monitoring in the outpatient setting.  - psych F/u: Patient is not at risk for suicide. However considering her cognitive impairment she is at risk for unintentional self harm. Hence would recommend her to be monitored closely. She will benefit from being place in a long term care facility for dementia  - c/w fluphenazine, trazodone    # DVT prophylaxis    #Full code    #Pending: awaiting placement in long term care facility  # no family at bedside. left a message for amador to call back for update  # Disposition: NH

## 2019-11-29 NOTE — PROGRESS NOTE ADULT - SUBJECTIVE AND OBJECTIVE BOX
BHAVESH LOWERY 63y Female  MRN#: [id number]   CODE STATUS: Full      SUBJECTIVE  Patient is a 63y old Female who presents with a chief complaint of Change in mental status (22 Nov 2019 12:34)  Currently admitted to medicine with the primary diagnosis of Mental status change resolved    Today is hospital day 8, patient is admitted for placement/depression/unable to take care of her self by niece.  working on placement. patient asks everyone " Am I dying" , "Can I go home", pending placement    OBJECTIVE  PAST MEDICAL & SURGICAL HISTORY  Psychosis    ALLERGIES:  No Known Allergies    MEDICATIONS:  MEDICATIONS  (STANDING):  chlorhexidine 4% Liquid 1 Application(s) Topical <User Schedule>  enoxaparin Injectable 40 milliGRAM(s) SubCutaneous daily  fluPHENAZine 1 milliGRAM(s) Oral two times a day  nicotine - 21 mG/24Hr(s) Patch 1 patch Transdermal daily  traZODone 100 milliGRAM(s) Oral at bedtime    MEDICATIONS  (PRN):      VITAL SIGNS: Last 24 Hours  Vital Signs Last 24 Hrs  T(C): 36.2 (29 Nov 2019 13:40), Max: 36.2 (29 Nov 2019 13:40)  T(F): 97.2 (29 Nov 2019 13:40), Max: 97.2 (29 Nov 2019 13:40)  HR: 82 (29 Nov 2019 13:40) (77 - 99)  BP: 127/70 (29 Nov 2019 13:40) (127/70 - 136/83)  BP(mean): 91 (29 Nov 2019 13:40) (91 - 91)  RR: 20 (29 Nov 2019 13:40) (19 - 20)  SpO2: 99% (29 Nov 2019 13:40) (97% - 99%)  PHYSICAL EXAM:  GENERAL: NAD, well-developed, AAOx1-2, unable to concentrate, making grunting sound, inappropriate answers  HEENT:  Atraumatic, Normocephalic.   PULMONARY: Clear to auscultation bilaterally; No wheeze  CARDIOVASCULAR: Regular rate and rhythm; No murmurs, rubs, or gallops  GASTROINTESTINAL: Soft, Nontender, Nondistended; Bowel sounds present  MUSCULOSKELETAL:  2+ Peripheral Pulses, No clubbing, cyanosis, or edema  NEUROLOGY: non-focal  SKIN: No rashes or lesions    LABS:                                         14.9   7.08  )-----------( 212      ( 25 Nov 2019 06:34 )             42.5                           15.3   7.06  )-----------( 192      ( 23 Nov 2019 12:06 )             43.5                           15.6   7.12  )-----------( 175      ( 21 Nov 2019 12:18 )             43.8     11-25    139  |  99  |  8<L>  ----------------------------<  103<H>  3.8   |  24  |  0.6<L>    Ca    9.3      25 Nov 2019 06:34    TPro  8.5<H>  /  Alb  4.5  /  TBili  1.0  /  DBili  x   /  AST  45<H>  /  ALT  51<H>  /  AlkPhos  71  11-25 11-23    136  |  97<L>  |  9<L>  ----------------------------<  109<H>  3.7   |  24  |  0.5<L>    Ca    9.4      23 Nov 2019 12:06  Mg     1.8     11-23 11-21    141  |  101  |  6<L>  ----------------------------<  106<H>  3.9   |  27  |  0.5<L>    Ca    9.3      21 Nov 2019 12:18  Mg     1.9     11-21    TPro  8.2<H>  /  Alb  4.2  /  TBili  0.9  /  DBili  x   /  AST  46<H>  /  ALT  47<H>  /  AlkPhos  68  11-21                  RADIOLOGY:  CT Head No Cont (11.20.19 @ 22:32) >  Impression:     No CT evidence of acute intracranial pathology. BHAVESH LOWERY 63y Female  MRN#: [id number]   CODE STATUS: Full      SUBJECTIVE  Patient is a 63y old Female who presents with a chief complaint of Change in mental status (22 Nov 2019 12:34)  Currently admitted to medicine with the primary diagnosis of Mental status change resolved    Today is hospital day 8, patient is admitted for placement/depression/unable to take care of her self by niece.  working on placement. patient asks everyone " Am I dying" , "Can I go home", pending placement    OBJECTIVE  PAST MEDICAL & SURGICAL HISTORY  Psychosis    ALLERGIES:  No Known Allergies    MEDICATIONS:  MEDICATIONS  (STANDING):  chlorhexidine 4% Liquid 1 Application(s) Topical <User Schedule>  enoxaparin Injectable 40 milliGRAM(s) SubCutaneous daily  fluPHENAZine 1 milliGRAM(s) Oral two times a day  nicotine - 21 mG/24Hr(s) Patch 1 patch Transdermal daily  traZODone 100 milliGRAM(s) Oral at bedtime    MEDICATIONS  (PRN):      VITAL SIGNS: Last 24 Hours  Vital Signs Last 24 Hrs  T(C): 36.2 (29 Nov 2019 13:40), Max: 36.2 (29 Nov 2019 13:40)  T(F): 97.2 (29 Nov 2019 13:40), Max: 97.2 (29 Nov 2019 13:40)  HR: 82 (29 Nov 2019 13:40) (77 - 99)  BP: 127/70 (29 Nov 2019 13:40) (127/70 - 136/83)  BP(mean): 91 (29 Nov 2019 13:40) (91 - 91)  RR: 20 (29 Nov 2019 13:40) (19 - 20)  SpO2: 99% (29 Nov 2019 13:40) (97% - 99%)  PHYSICAL EXAM:  GENERAL: NAD, well-developed, AAOx1-2, unable to concentrate, making grunting sound, inappropriate answers  HEENT:  Atraumatic, Normocephalic.   PULMONARY: Clear to auscultation bilaterally; No wheeze  CARDIOVASCULAR: Regular rate and rhythm; No murmurs, rubs, or gallops  GASTROINTESTINAL: Soft, Nontender, Nondistended; Bowel sounds present  MUSCULOSKELETAL:  2+ Peripheral Pulses, No clubbing, cyanosis, or edema  NEUROLOGY: non-focal  SKIN: No rashes or lesions    LABS:                                         14.9   7.08  )-----------( 212      ( 25 Nov 2019 06:34 )             42.5                           15.3   7.06  )-----------( 192      ( 23 Nov 2019 12:06 )             43.5                           15.6   7.12  )-----------( 175      ( 21 Nov 2019 12:18 )             43.8     11-25    139  |  99  |  8<L>  ----------------------------<  103<H>  3.8   |  24  |  0.6<L>    Ca    9.3      25 Nov 2019 06:34    TPro  8.5<H>  /  Alb  4.5  /  TBili  1.0  /  DBili  x   /  AST  45<H>  /  ALT  51<H>  /  AlkPhos  71  11-25 11-23    136  |  97<L>  |  9<L>  ----------------------------<  109<H>  3.7   |  24  |  0.5<L>    Ca    9.4      23 Nov 2019 12:06  Mg     1.8     11-23 11-21    141  |  101  |  6<L>  ----------------------------<  106<H>  3.9   |  27  |  0.5<L>    Ca    9.3      21 Nov 2019 12:18  Mg     1.9     11-21    TPro  8.2<H>  /  Alb  4.2  /  TBili  0.9  /  DBili  x   /  AST  46<H>  /  ALT  47<H>  /  AlkPhos  68  11-21            RADIOLOGY:  CT Head No Cont (11.20.19 @ 22:32) >  Impression:     No CT evidence of acute intracranial pathology.

## 2019-11-29 NOTE — PROGRESS NOTE ADULT - SUBJECTIVE AND OBJECTIVE BOX
Patient is a 63y old  Female who presents with a chief complaint of Change in mental status (22 Nov 2019 10:00)    Patient was seen and examined.  Awaiting placement in long term facility.  Today denies any complaints.  no overnight events    PAST MEDICAL & SURGICAL HISTORY:  Psychosis    Allergies  No Known Allergies    MEDICATIONS  (STANDING):  chlorhexidine 4% Liquid 1 Application(s) Topical <User Schedule>  enoxaparin Injectable 40 milliGRAM(s) SubCutaneous daily  fluPHENAZine 1 milliGRAM(s) Oral two times a day  nicotine - 21 mG/24Hr(s) Patch 1 patch Transdermal daily  traZODone 100 milliGRAM(s) Oral at bedtime    MEDICATIONS  (PRN):    T(C): 35.9 (11-29-19 @ 07:24), Max: 36.1 (11-28-19 @ 14:40)  HR: 77 (11-29-19 @ 07:24) (77 - 99)  BP: 129/71 (11-29-19 @ 07:24) (129/71 - 136/83)  RR: 19 (11-29-19 @ 07:24) (19 - 20)  SpO2: 97% (11-29-19 @ 07:24) (97% - 99%)    O/E:  Awake, alert, not in distress.  HEENT: atraumatic, EOMI.  Chest: clear.  CVS: SIS2 +, no murmur.  P/A: Soft, BS+  CNS: non focal.  Ext: no edema feet.  Skin: no rash, no ulcers.  All systems reviewed positive findings as above.

## 2019-11-30 PROCEDURE — 99231 SBSQ HOSP IP/OBS SF/LOW 25: CPT

## 2019-11-30 RX ADMIN — FLUPHENAZINE HYDROCHLORIDE 1 MILLIGRAM(S): 1 TABLET, FILM COATED ORAL at 17:14

## 2019-11-30 RX ADMIN — Medication 100 MILLIGRAM(S): at 21:01

## 2019-11-30 RX ADMIN — Medication 1 PATCH: at 11:03

## 2019-11-30 RX ADMIN — ENOXAPARIN SODIUM 40 MILLIGRAM(S): 100 INJECTION SUBCUTANEOUS at 11:03

## 2019-11-30 RX ADMIN — FLUPHENAZINE HYDROCHLORIDE 1 MILLIGRAM(S): 1 TABLET, FILM COATED ORAL at 05:31

## 2019-11-30 NOTE — PROGRESS NOTE ADULT - ASSESSMENT
Patient is not at risk for suicide. However considering her cognitive impairment she is at risk for unintentional self harm. Hence would recommend her to be monitored closely.62 yo female with no known PMH recently  with subsequent IPP admission and relocation to [place] with family after discharge. Patient with unclear psychiatric h/o, discharge dx acute psychosis, and remote h/o polysubstance abuse with no reported suicide attempts. Patient brought in by niece for a higher level of care with the family unable to provide appropriate supervision.  Patient does not know why she was brought to the hospital and denies any concerns. She answers most questions with "I don't know", unable to list her medications, psychiatric diagnosis/hospitalizations, reason for presentation.    # Psychosis with cognitive decline  - H/o IPP admission for acut psychosis  - CT Head No Cont (11.20.19 @ 22:32) >No CT evidence of acute intracranial pathology.  - Vitamin B12, Serum: 719 pg/mL (11.21.19 @ 12:18)  - Treponema Pallidum Antibody Interpretation: Negative(11.21.19 @ 12:18)  - Thyroid Stimulating Hormone, Serum: 0.61 uIU/mL (11.21.19 @ 12:18)  - psych eval: Patient presentation appear consistent with neurocognitive impairment, increasing her risk for unintentional self-harm.she will likely benefit from a higher level of monitoring in the outpatient setting.  - psych F/u: Patient is not at risk for suicide. However considering her cognitive impairment she is at risk for unintentional self harm. Hence would recommend her to be monitored closely. She will benefit from being place in a long term care facility for dementia  - c/w fluphenazine, trazodone    # DVT prophylaxis    #Full code    #Pending: awaiting placement in long term care facility  # no family at bedside. left a message for amador to call back for update  # Disposition: NH

## 2019-11-30 NOTE — PROGRESS NOTE ADULT - SUBJECTIVE AND OBJECTIVE BOX
Patient is a 63y old  Female who presents with a chief complaint of Change in mental status (22 Nov 2019 10:00)    Patient was seen and examined.  Awaiting placement in long term facility.  Today denies any complaints.  no overnight events    PAST MEDICAL & SURGICAL HISTORY:  Psychosis    Allergies  No Known Allergies    MEDICATIONS  (STANDING):  chlorhexidine 4% Liquid 1 Application(s) Topical <User Schedule>  enoxaparin Injectable 40 milliGRAM(s) SubCutaneous daily  fluPHENAZine 1 milliGRAM(s) Oral two times a day  nicotine - 21 mG/24Hr(s) Patch 1 patch Transdermal daily  traZODone 100 milliGRAM(s) Oral at bedtime    MEDICATIONS  (PRN):    T(C): 37 (11-30-19 @ 06:30), Max: 37 (11-30-19 @ 06:30)  HR: 87 (11-30-19 @ 06:30) (84 - 87)  BP: 118/59 (11-30-19 @ 06:30) (118/59 - 146/67)  RR: 19 (11-30-19 @ 06:30) (19 - 20)  SpO2: 98% (11-30-19 @ 06:30) (98% - 98%)    O/E:  Awake, alert, not in distress.  HEENT: atraumatic, EOMI.  Chest: clear.  CVS: SIS2 +, no murmur.  P/A: Soft, BS+  CNS: non focal.  Ext: no edema feet.  Skin: no rash, no ulcers.  All systems reviewed positive findings as above.

## 2019-11-30 NOTE — PROGRESS NOTE ADULT - SUBJECTIVE AND OBJECTIVE BOX
BHAVESH LOWERY 63y Female  MRN#: [id number]   CODE STATUS: Full      SUBJECTIVE  Patient is a 63y old Female who presents with a chief complaint of Change in mental status (22 Nov 2019 12:34)  Currently admitted to medicine with the primary diagnosis of Mental status change resolved    Today is hospital day , patient is admitted for placement/depression/unable to take care of her self by niece.  working on placement. patient asks everyone " Am I dying" , "Can I go home", pending placement    OBJECTIVE  PAST MEDICAL & SURGICAL HISTORY  Psychosis    ALLERGIES:  No Known Allergies    MEDICATIONS:  MEDICATIONS  (STANDING):  chlorhexidine 4% Liquid 1 Application(s) Topical <User Schedule>  enoxaparin Injectable 40 milliGRAM(s) SubCutaneous daily  fluPHENAZine 1 milliGRAM(s) Oral two times a day  nicotine - 21 mG/24Hr(s) Patch 1 patch Transdermal daily  traZODone 100 milliGRAM(s) Oral at bedtime    MEDICATIONS  (PRN):      VITAL SIGNS: Last 24 Hours  Vital Signs Last 24 Hrs  T(C): 37 (30 Nov 2019 06:30), Max: 37 (30 Nov 2019 06:30)  T(F): 98.6 (30 Nov 2019 06:30), Max: 98.6 (30 Nov 2019 06:30)  HR: 87 (30 Nov 2019 06:30) (82 - 87)  BP: 118/59 (30 Nov 2019 06:30) (118/59 - 146/67)  BP(mean): 91 (29 Nov 2019 13:40) (91 - 91)  RR: 19 (30 Nov 2019 06:30) (19 - 20)  SpO2: 98% (30 Nov 2019 06:30) (98% - 99%)  PHYSICAL EXAM:  GENERAL: NAD, well-developed, AAOx1-2, unable to concentrate, making grunting sound, inappropriate answers  HEENT:  Atraumatic, Normocephalic.   PULMONARY: Clear to auscultation bilaterally; No wheeze  CARDIOVASCULAR: Regular rate and rhythm; No murmurs, rubs, or gallops  GASTROINTESTINAL: Soft, Nontender, Nondistended; Bowel sounds present  MUSCULOSKELETAL:  2+ Peripheral Pulses, No clubbing, cyanosis, or edema  NEUROLOGY: non-focal  SKIN: No rashes or lesions    LABS:                                         14.9   7.08  )-----------( 212      ( 25 Nov 2019 06:34 )             42.5                           15.3   7.06  )-----------( 192      ( 23 Nov 2019 12:06 )             43.5                           15.6   7.12  )-----------( 175      ( 21 Nov 2019 12:18 )             43.8     11-25    139  |  99  |  8<L>  ----------------------------<  103<H>  3.8   |  24  |  0.6<L>    Ca    9.3      25 Nov 2019 06:34    TPro  8.5<H>  /  Alb  4.5  /  TBili  1.0  /  DBili  x   /  AST  45<H>  /  ALT  51<H>  /  AlkPhos  71  11-25 11-23    136  |  97<L>  |  9<L>  ----------------------------<  109<H>  3.7   |  24  |  0.5<L>    Ca    9.4      23 Nov 2019 12:06  Mg     1.8     11-23 11-21    141  |  101  |  6<L>  ----------------------------<  106<H>  3.9   |  27  |  0.5<L>    Ca    9.3      21 Nov 2019 12:18  Mg     1.9     11-21    TPro  8.2<H>  /  Alb  4.2  /  TBili  0.9  /  DBili  x   /  AST  46<H>  /  ALT  47<H>  /  AlkPhos  68  11-21            RADIOLOGY:  CT Head No Cont (11.20.19 @ 22:32) >  Impression:     No CT evidence of acute intracranial pathology.

## 2019-11-30 NOTE — PROGRESS NOTE ADULT - ASSESSMENT
64 yo female with no known PMH recently  with subsequent IPP admission in [place] in [place] and relocation to [place] with family after discharge. Patient with unclear psychiatric h/o, discharge dx acute psychosis, and remote h/o polysubstance abuse with no reported suicide attempts. Patient brought in by niece for a higher level of care with the family unable to provide appropriate supervision.  Patient does not know why she was brought to the hospital and denies any concerns. She answers most questions with "I don't know", unable to list her medications, psychiatric diagnosis/hospitalizations, reason for presentation.    #) Cognitive impairment? Acute psychosis secondary to polysubstance abuse?  -  passed away about a month ago, Grievance vs depression along with cognitive impairment   - fluPHENAZine   - traZODone   - patient was admitted for ipp in [place] and was later discharged with the diagnosis of acute psychosis, currently patient has no symptoms  - Niece mentioned patient is unable to take care of herself, and family is unable to care for her, need facility to care for her  - psych consulted two times now: both agreed patient does not need to be in Inpatient psychiatry, but agreed need constant observation hence 1 to 1 due to unintentional risk of harm  - TSH/b12/symphilis negative  - Due to cognitive impairment patient is increased risk for unintentional self-harm.  - Denies suicidal ideation    # smoker  -nicotine - 21 mG/24Hr(s) Patch 1 patch Transdermal daily    # HCV positive (negative)  - appears to have been treated in the past  - patient/family does not know      Activity: increase as tolerated  Gi ppx: no need  Dvt ppx: lovenox  Diet: mechanical soft diet  Dispo: pending placement ([facility] coming to evaluate the patient), pending  ( [place], then [place] can get insurance in [place], case manger working on it)  Code: full

## 2019-12-01 PROCEDURE — 99231 SBSQ HOSP IP/OBS SF/LOW 25: CPT

## 2019-12-01 RX ADMIN — Medication 100 MILLIGRAM(S): at 21:10

## 2019-12-01 RX ADMIN — ENOXAPARIN SODIUM 40 MILLIGRAM(S): 100 INJECTION SUBCUTANEOUS at 11:01

## 2019-12-01 RX ADMIN — FLUPHENAZINE HYDROCHLORIDE 1 MILLIGRAM(S): 1 TABLET, FILM COATED ORAL at 17:00

## 2019-12-01 RX ADMIN — CHLORHEXIDINE GLUCONATE 1 APPLICATION(S): 213 SOLUTION TOPICAL at 05:55

## 2019-12-01 RX ADMIN — FLUPHENAZINE HYDROCHLORIDE 1 MILLIGRAM(S): 1 TABLET, FILM COATED ORAL at 05:56

## 2019-12-01 RX ADMIN — Medication 1 PATCH: at 11:01

## 2019-12-01 NOTE — PROGRESS NOTE ADULT - SUBJECTIVE AND OBJECTIVE BOX
Patient is a 63y old  Female who presents with a chief complaint of Change in mental status (22 Nov 2019 10:00)    Patient was seen and examined.  Awaiting placement in long term facility.  Today denies any complaints.  no overnight events    PAST MEDICAL & SURGICAL HISTORY:  Psychosis    Allergies  No Known Allergies    MEDICATIONS  (STANDING):  chlorhexidine 4% Liquid 1 Application(s) Topical <User Schedule>  enoxaparin Injectable 40 milliGRAM(s) SubCutaneous daily  fluPHENAZine 1 milliGRAM(s) Oral two times a day  nicotine - 21 mG/24Hr(s) Patch 1 patch Transdermal daily  traZODone 100 milliGRAM(s) Oral at bedtime    MEDICATIONS  (PRN):    T(C): 36.4 (12-01-19 @ 05:57), Max: 37.1 (11-30-19 @ 14:46)  HR: 85 (12-01-19 @ 05:57) (80 - 100)  BP: 114/57 (12-01-19 @ 05:57) (114/57 - 130/78)  RR: 20 (12-01-19 @ 05:57) (19 - 20)  SpO2: 97% (12-01-19 @ 05:57) (97% - 100%)    O/E:  Awake, alert, not in distress.  HEENT: atraumatic, EOMI.  Chest: clear.  CVS: SIS2 +, no murmur.  P/A: Soft, BS+  CNS: non focal.  Ext: no edema feet.  Skin: no rash, no ulcers.  All systems reviewed positive findings as above.

## 2019-12-01 NOTE — PROGRESS NOTE ADULT - ASSESSMENT
Patient is not at risk for suicide. However considering her cognitive impairment she is at risk for unintentional self harm. Hence would recommend her to be monitored closely.62 yo female with no known PMH recently  with subsequent IPP admission and relocation to [place] with family after discharge. Patient with unclear psychiatric h/o, discharge dx acute psychosis, and remote h/o polysubstance abuse with no reported suicide attempts. Patient brought in by niece for a higher level of care with the family unable to provide appropriate supervision.  Patient does not know why she was brought to the hospital and denies any concerns. She answers most questions with "I don't know", unable to list her medications, psychiatric diagnosis/hospitalizations, reason for presentation.    # Psychosis with cognitive decline  - H/o IPP admission for acut psychosis  - CT Head No Cont (11.20.19 @ 22:32) >No CT evidence of acute intracranial pathology.  - Vitamin B12, Serum: 719 pg/mL (11.21.19 @ 12:18)  - Treponema Pallidum Antibody Interpretation: Negative(11.21.19 @ 12:18)  - Thyroid Stimulating Hormone, Serum: 0.61 uIU/mL (11.21.19 @ 12:18)  - psych eval: Patient presentation appear consistent with neurocognitive impairment, increasing her risk for unintentional self-harm.she will likely benefit from a higher level of monitoring in the outpatient setting.  - psych F/u: Patient is not at risk for suicide. However considering her cognitive impairment she is at risk for unintentional self harm. Hence would recommend her to be monitored closely. She will benefit from being place in a long term care facility for dementia  - c/w fluphenazine, trazodone    # DVT prophylaxis    #Full code    #Pending: awaiting placement in long term care facility  # no family at bedside.   # Disposition: NH

## 2019-12-02 LAB — HCV GENTYP BLD NAA+PROBE: SIGNIFICANT CHANGE UP

## 2019-12-02 PROCEDURE — 99231 SBSQ HOSP IP/OBS SF/LOW 25: CPT

## 2019-12-02 RX ADMIN — Medication 1 PATCH: at 08:12

## 2019-12-02 RX ADMIN — Medication 1 PATCH: at 11:30

## 2019-12-02 RX ADMIN — ENOXAPARIN SODIUM 40 MILLIGRAM(S): 100 INJECTION SUBCUTANEOUS at 11:35

## 2019-12-02 RX ADMIN — Medication 1 PATCH: at 11:34

## 2019-12-02 RX ADMIN — FLUPHENAZINE HYDROCHLORIDE 1 MILLIGRAM(S): 1 TABLET, FILM COATED ORAL at 17:36

## 2019-12-02 RX ADMIN — Medication 100 MILLIGRAM(S): at 21:03

## 2019-12-02 RX ADMIN — Medication 1 PATCH: at 19:15

## 2019-12-02 RX ADMIN — FLUPHENAZINE HYDROCHLORIDE 1 MILLIGRAM(S): 1 TABLET, FILM COATED ORAL at 06:39

## 2019-12-02 NOTE — PROGRESS NOTE ADULT - ASSESSMENT
Patient is not at risk for suicide. However considering her cognitive impairment she is at risk for unintentional self harm. Hence would recommend her to be monitored closely.62 yo female with no known PMH recently  with subsequent IPP admission and relocation to [place] with family after discharge. Patient with unclear psychiatric h/o, discharge dx acute psychosis, and remote h/o polysubstance abuse with no reported suicide attempts. Patient brought in by niece for a higher level of care with the family unable to provide appropriate supervision.  Patient does not know why she was brought to the hospital and denies any concerns. She answers most questions with "I don't know", unable to list her medications, psychiatric diagnosis/hospitalizations, reason for presentation.    # Psychosis with cognitive decline  - H/o IPP admission for acut psychosis  - CT Head No Cont (11.20.19 @ 22:32) >No CT evidence of acute intracranial pathology.  - Vitamin B12, Serum: 719 pg/mL (11.21.19 @ 12:18)  - Treponema Pallidum Antibody Interpretation: Negative(11.21.19 @ 12:18)  - Thyroid Stimulating Hormone, Serum: 0.61 uIU/mL (11.21.19 @ 12:18)  - psych eval: Patient presentation appear consistent with neurocognitive impairment, increasing her risk for unintentional self-harm.she will likely benefit from a higher level of monitoring in the outpatient setting.  - psych F/u: Patient is not at risk for suicide. However considering her cognitive impairment she is at risk for unintentional self harm. Hence would recommend her to be monitored closely. She will benefit from being place in a long term care facility for dementia  - c/w fluphenazine, trazodone    # DVT prophylaxis    #Full code    #Pending: awaiting placement in long term care facility  # no family at bedside.   # Disposition: NH

## 2019-12-02 NOTE — PROGRESS NOTE ADULT - ASSESSMENT
64 yo female with no known PMH recently  with subsequent IPP admission in [place] in [place] and relocation to [place] with family after discharge. Patient with unclear psychiatric h/o, discharge dx acute psychosis, and remote h/o polysubstance abuse with no reported suicide attempts. Patient brought in by niece for a higher level of care with the family unable to provide appropriate supervision.    Patient does not know why she was brought to the hospital and denies any concerns. She answers most questions with "I don't know", unable to list her medications, psychiatric diagnosis/hospitalizations, reason for presentation.    #) Cognitive impairment? Acute psychosis secondary to polysubstance abuse?  -  passed away about a month ago, Grievance vs depression along with cognitive impairment   - fluPHENAZine   - traZODone   - patient was admitted for ipp in [place] and was later discharged with the diagnosis of acute psychosis, currently patient has no symptoms  - Niece mentioned patient is unable to take care of herself, and family is unable to care for her, need facility to care for her  - psych consulted two times now: both agreed patient does not need to be in Inpatient psychiatry,- TSH/b12/symphilis negative  - Due to cognitive impairment patient is increased risk for unintentional self-harm.  - Denies suicidal ideation    # smoker  -nicotine - 21 mG/24Hr(s) Patch 1 patch Transdermal daily    # HCV positive (negative)  - appears to have been treated in the past  - labs show high viral load for HCV, apparently treatment was not successful  -patient will need outpatient follow up with GI for treatment  - patient/family does not know      Activity: increase as tolerated  Gi ppx: no need  Dvt ppx: lovenox  Diet: mechanical soft diet  Dispo: pending placement ([facility] coming to evaluate the patient), pending  ( [place], then patient can get insurance in [place], case manger working on it)  Code: full

## 2019-12-02 NOTE — PROGRESS NOTE ADULT - SUBJECTIVE AND OBJECTIVE BOX
LENGTH OF HOSPITAL STAY: 11d    CHIEF COMPLAINT:   Patient is a 63y old  Female who presents with a chief complaint of Change in mental status (02 Dec 2019 12:28)      HISTORY OF PRESENTING ILLNESS:    HPI:  Ms. Matos is 64 yo female with no known PMH recently  with subsequent IPP admission and relocation to [place] with family after discharge. Patient with unclear psychiatric h/o, discharge dx acute psychosis, and remote h/o polysubstance abuse with no reported suicide attempts. Patient brought in by niece for a higher level of care with the family unable to provide appropriate supervision.    Patient does not know why she was brought to the hospital and denies any concerns. She denies feeling sad or having any worries. She denies any perceptual disturbances. She denies any h/o trauma. She answers most questions with "I don't know", unable to list her medications, psychiatric diagnosis/hospitalizations, reason for presentation. Patient reports feeling safe at sister's home, but states that she doesn't do anything during the day.     Patient's niece reports that the hospital recommended patient be transferred to a long-term care facility; however, with family in [place] she was relocated. Niece reports that now patient appears to be in her chronic state of impairment, with loose associations and poor insight and recall. She states that the patient has been like this for years and is why the  had to provide close supervision; however, there is no one available in the family to provide this level of care now. The family reports that they are unable to safely supervise her.    Currently patient denies any chest pain or sob, no recent fevers or chills, no cough or congestion, denies hallucinations. Patient was evaluated by psychiatry, differential include: neurocognitive impairment, tbi, complex bereavement, psychosis. (21 Nov 2019 05:13)    PAST MEDICAL & SURGICAL HISTORY  PAST MEDICAL & SURGICAL HISTORY:  Psychosis    SOCIAL HISTORY:    ALLERGIES:  No Known Allergies    MEDICATIONS:  STANDING MEDICATIONS  chlorhexidine 4% Liquid 1 Application(s) Topical <User Schedule>  enoxaparin Injectable 40 milliGRAM(s) SubCutaneous daily  fluPHENAZine 1 milliGRAM(s) Oral two times a day  nicotine - 21 mG/24Hr(s) Patch 1 patch Transdermal daily  traZODone 100 milliGRAM(s) Oral at bedtime    PRN MEDICATIONS    VITALS:   T(F): 96.9  HR: 81  BP: 122/56  RR: 18  SpO2: 98%    LABS:                        RADIOLOGY:    PHYSICAL EXAM:  GEN: No acute distress  HEENT: AT, NC, PERRLA  LUNGS: Clear to auscultation bilaterally   HEART: S1/S2 present. RRR.   ABD: Soft, non-tender, non-distended. Bowel sounds present  EXT: edema, clubbing, cyanosis absent  NEURO: AAOX2

## 2019-12-03 PROCEDURE — 99233 SBSQ HOSP IP/OBS HIGH 50: CPT

## 2019-12-03 RX ADMIN — Medication 1 PATCH: at 11:28

## 2019-12-03 RX ADMIN — Medication 1 PATCH: at 06:58

## 2019-12-03 RX ADMIN — Medication 1 PATCH: at 20:27

## 2019-12-03 RX ADMIN — Medication 1 PATCH: at 11:31

## 2019-12-03 RX ADMIN — FLUPHENAZINE HYDROCHLORIDE 1 MILLIGRAM(S): 1 TABLET, FILM COATED ORAL at 17:10

## 2019-12-03 RX ADMIN — FLUPHENAZINE HYDROCHLORIDE 1 MILLIGRAM(S): 1 TABLET, FILM COATED ORAL at 05:45

## 2019-12-03 RX ADMIN — ENOXAPARIN SODIUM 40 MILLIGRAM(S): 100 INJECTION SUBCUTANEOUS at 11:29

## 2019-12-03 RX ADMIN — Medication 100 MILLIGRAM(S): at 21:24

## 2019-12-03 RX ADMIN — CHLORHEXIDINE GLUCONATE 1 APPLICATION(S): 213 SOLUTION TOPICAL at 05:45

## 2019-12-03 NOTE — PROGRESS NOTE ADULT - ASSESSMENT
64 yo female with no known PMH recently  with subsequent IPP admission in [place] in [place] and relocation to [place] with family after discharge. Patient with unclear psychiatric h/o, discharge dx acute psychosis, and remote h/o polysubstance abuse with no reported suicide attempts. Patient brought in by niece for a higher level of care with the family unable to provide appropriate supervision.    Patient does not know why she was brought to the hospital and denies any concerns. She answers most questions with "I don't know", unable to list her medications, psychiatric diagnosis/hospitalizations, reason for presentation.    #) Cognitive impairment? Acute psychosis secondary to polysubstance abuse?  -  passed away about a month ago, Grievance vs depression along with cognitive impairment   - fluPHENAZine   - traZODone   - patient was admitted for ipp in [place] and was later discharged with the diagnosis of acute psychosis, currently patient has no symptoms  - Niece mentioned patient is unable to take care of herself, and family is unable to care for her, need facility to care for her  - psych consulted two times now: both agreed patient does not need to be in Inpatient psychiatry,- TSH/b12/symphilis negative  - Due to cognitive impairment patient is increased risk for unintentional self-harm.  - Denies suicidal ideation    # smoker  -nicotine - 21 mG/24Hr(s) Patch 1 patch Transdermal daily    # HCV positive (negative)  - appears to have been treated in the past  - labs show high viral load for HCV, apparently treatment was not successful  -patient will need outpatient follow up with GI for treatment  - patient/family does not know      Activity: increase as tolerated  Gi ppx: no need  Dvt ppx: lovenox  Diet: mechanical soft diet  Dispo: pending placement ([facility] coming to evaluate the patient), pending  ( [place], then [place] can get insurance in [place], case manger working on it)  Code: full

## 2019-12-03 NOTE — PROGRESS NOTE ADULT - ASSESSMENT
63F PMHx polysubstance abuse, HCV here with cognitive impairment, chronic.    #Cognitive impairment, presumed due to h/o polysubstance abuse, ?psychosis  prolixin 1 bid  trazodone 100 qhs  discharge planning pending placement; previously cared for by  who recently passed away; mental status is baseline for many years per niece  no need for inpt psych admission per psych  #HCV  outp f/u ID, GI  #DVT ppx  lovenox    #Progress Note Handoff:  Pending (specify):  Consults_________, Tests________, Test Results_______, Other_____discharge planning____  Family discussion:to reach out to family  Disposition: Home___/SNF___/Other________/Unknown at this time___x_____

## 2019-12-03 NOTE — PROGRESS NOTE ADULT - SUBJECTIVE AND OBJECTIVE BOX
INTERVAL HPI/OVERNIGHT EVENTS:    SUBJECTIVE: Patient seen and examined at bedside.     no cp, sob, abd pain, fever; confused    OBJECTIVE:    VITAL SIGNS:  ICU Vital Signs Last 24 Hrs  T(C): 35.9 (03 Dec 2019 06:53), Max: 35.9 (03 Dec 2019 06:53)  T(F): 96.6 (03 Dec 2019 06:53), Max: 96.6 (03 Dec 2019 06:53)  HR: 78 (03 Dec 2019 06:53) (78 - 82)  BP: 117/63 (03 Dec 2019 06:53) (117/63 - 136/79)  BP(mean): --  ABP: --  ABP(mean): --  RR: 18 (03 Dec 2019 06:53) (18 - 20)  SpO2: 98% (03 Dec 2019 06:53) (98% - 99%)        CAPILLARY BLOOD GLUCOSE          PHYSICAL EXAM:    General: NAD  HEENT: NC/AT; PERRL, clear conjunctiva  Neck: supple  Respiratory: CTA b/l  Cardiovascular: +S1/S2; RRR  Abdomen: soft, NT/ND; +BS x4  Extremities: WWP, 2+ peripheral pulses b/l; no LE edema  Skin: normal color and turgor; no rash  Neurological:    MEDICATIONS:  MEDICATIONS  (STANDING):  chlorhexidine 4% Liquid 1 Application(s) Topical <User Schedule>  enoxaparin Injectable 40 milliGRAM(s) SubCutaneous daily  fluPHENAZine 1 milliGRAM(s) Oral two times a day  nicotine - 21 mG/24Hr(s) Patch 1 patch Transdermal daily  traZODone 100 milliGRAM(s) Oral at bedtime    MEDICATIONS  (PRN):      ALLERGIES:  Allergies    No Known Allergies    Intolerances        LABS:              Creatinine Trend: 0.6<--, 0.5<--, 0.5<--, 0.6<--        hs Troponin:              CSF:                      EKG:   MICROBIOLOGY:    IMAGING:      Labs, imaging, EKG personally reviewed    RADIOLOGY & ADDITIONAL TESTS: Reviewed.

## 2019-12-03 NOTE — PROGRESS NOTE ADULT - SUBJECTIVE AND OBJECTIVE BOX
LENGTH OF HOSPITAL STAY: 12d    CHIEF COMPLAINT:   Patient is a 63y old  Female who presents with a chief complaint of Change in mental status (03 Dec 2019 13:41)      HISTORY OF PRESENTING ILLNESS:    HPI:  Ms. Matos is 64 yo female with no known PMH recently  with subsequent IPP admission and relocation to [place] with family after discharge. Patient with unclear psychiatric h/o, discharge dx acute psychosis, and remote h/o polysubstance abuse with no reported suicide attempts. Patient brought in by niece for a higher level of care with the family unable to provide appropriate supervision.    Patient does not know why she was brought to the hospital and denies any concerns. She denies feeling sad or having any worries. She denies any perceptual disturbances. She denies any h/o trauma. She answers most questions with "I don't know", unable to list her medications, psychiatric diagnosis/hospitalizations, reason for presentation. Patient reports feeling safe at sister's home, but states that she doesn't do anything during the day.     Patient's niece reports that the hospital recommended patient be transferred to a long-term care facility; however, with family in [place] she was relocated. Niece reports that now patient appears to be in her chronic state of impairment, with loose associations and poor insight and recall. She states that the patient has been like this for years and is why the  had to provide close supervision; however, there is no one available in the family to provide this level of care now. The family reports that they are unable to safely supervise her.    Currently patient denies any chest pain or sob, no recent fevers or chills, no cough or congestion, denies hallucinations. Patient was evaluated by psychiatry, differential include: neurocognitive impairment, tbi, complex bereavement, psychosis. (21 Nov 2019 05:13)    PAST MEDICAL & SURGICAL HISTORY  PAST MEDICAL & SURGICAL HISTORY:  Psychosis    SOCIAL HISTORY:    ALLERGIES:  No Known Allergies    MEDICATIONS:  STANDING MEDICATIONS  chlorhexidine 4% Liquid 1 Application(s) Topical <User Schedule>  enoxaparin Injectable 40 milliGRAM(s) SubCutaneous daily  fluPHENAZine 1 milliGRAM(s) Oral two times a day  nicotine - 21 mG/24Hr(s) Patch 1 patch Transdermal daily  traZODone 100 milliGRAM(s) Oral at bedtime    PRN MEDICATIONS    VITALS:   T(F): 98.1  HR: 95  BP: 141/61  RR: 18  SpO2: 98%    LABS:                        RADIOLOGY:    PHYSICAL EXAM:  GEN: No acute distress  HEENT: AT, NC, PERRLA  LUNGS: Clear to auscultation bilaterally   HEART: S1/S2 present. RRR.   ABD: Soft, non-tender, non-distended. Bowel sounds present  EXT: edema, clubbing, cyanosis absent  NEURO: AAOX2

## 2019-12-04 PROCEDURE — 99231 SBSQ HOSP IP/OBS SF/LOW 25: CPT

## 2019-12-04 RX ADMIN — Medication 1 PATCH: at 07:50

## 2019-12-04 RX ADMIN — Medication 100 MILLIGRAM(S): at 21:07

## 2019-12-04 RX ADMIN — Medication 1 PATCH: at 11:11

## 2019-12-04 RX ADMIN — CHLORHEXIDINE GLUCONATE 1 APPLICATION(S): 213 SOLUTION TOPICAL at 05:43

## 2019-12-04 RX ADMIN — Medication 1 PATCH: at 17:04

## 2019-12-04 RX ADMIN — FLUPHENAZINE HYDROCHLORIDE 1 MILLIGRAM(S): 1 TABLET, FILM COATED ORAL at 05:44

## 2019-12-04 RX ADMIN — FLUPHENAZINE HYDROCHLORIDE 1 MILLIGRAM(S): 1 TABLET, FILM COATED ORAL at 17:05

## 2019-12-04 RX ADMIN — ENOXAPARIN SODIUM 40 MILLIGRAM(S): 100 INJECTION SUBCUTANEOUS at 11:11

## 2019-12-04 RX ADMIN — Medication 1 PATCH: at 11:21

## 2019-12-04 NOTE — PROGRESS NOTE ADULT - SUBJECTIVE AND OBJECTIVE BOX
Patient is a 63y old  Female who presents with a chief complaint of Change in mental status (22 Nov 2019 10:00)    Patient was seen and examined.  Awaiting placement in long term facility.  Today denies any complaints.  no overnight events    PAST MEDICAL & SURGICAL HISTORY:  Psychosis    Allergies  No Known Allergies    MEDICATIONS  (STANDING):  chlorhexidine 4% Liquid 1 Application(s) Topical <User Schedule>  enoxaparin Injectable 40 milliGRAM(s) SubCutaneous daily  fluPHENAZine 1 milliGRAM(s) Oral two times a day  nicotine - 21 mG/24Hr(s) Patch 1 patch Transdermal daily  traZODone 100 milliGRAM(s) Oral at bedtime    MEDICATIONS  (PRN):    T(C): 35.6 (12-03-19 @ 22:17), Max: 36.7 (12-03-19 @ 14:31)  HR: 90 (12-04-19 @ 05:47) (80 - 95)  BP: 109/68 (12-04-19 @ 05:47) (109/68 - 145/65)  RR: 18 (12-04-19 @ 05:47) (18 - 20)  SpO2: --    O/E:  Awake, alert, not in distress.  HEENT: atraumatic, EOMI.  Chest: clear.  CVS: SIS2 +, no murmur.  P/A: Soft, BS+  CNS: non focal.  Ext: no edema feet.  Skin: no rash, no ulcers.  All systems reviewed positive findings as above.

## 2019-12-04 NOTE — PROGRESS NOTE ADULT - SUBJECTIVE AND OBJECTIVE BOX
LENGTH OF HOSPITAL STAY: 13d    CHIEF COMPLAINT:   Patient is a 63y old  Female who presents with a chief complaint of Change in mental status (04 Dec 2019 11:36)      HISTORY OF PRESENTING ILLNESS:    HPI:  Ms. Matos is 62 yo female with no known PMH recently  with subsequent IPP admission and relocation to [place] with family after discharge. Patient with unclear psychiatric h/o, discharge dx acute psychosis, and remote h/o polysubstance abuse with no reported suicide attempts. Patient brought in by niece for a higher level of care with the family unable to provide appropriate supervision.    Patient does not know why she was brought to the hospital and denies any concerns. She denies feeling sad or having any worries. She denies any perceptual disturbances. She denies any h/o trauma. She answers most questions with "I don't know", unable to list her medications, psychiatric diagnosis/hospitalizations, reason for presentation. Patient reports feeling safe at sister's home, but states that she doesn't do anything during the day.     Patient's niece reports that the hospital recommended patient be transferred to a long-term care facility; however, with family in [place] she was relocated. Niece reports that now patient appears to be in her chronic state of impairment, with loose associations and poor insight and recall. She states that the patient has been like this for years and is why the  had to provide close supervision; however, there is no one available in the family to provide this level of care now. The family reports that they are unable to safely supervise her.    Currently patient denies any chest pain or sob, no recent fevers or chills, no cough or congestion, denies hallucinations. Patient was evaluated by psychiatry, differential include: neurocognitive impairment, tbi, complex bereavement, psychosis. (21 Nov 2019 05:13)    PAST MEDICAL & SURGICAL HISTORY  PAST MEDICAL & SURGICAL HISTORY:  Psychosis    SOCIAL HISTORY:    ALLERGIES:  No Known Allergies    MEDICATIONS:  STANDING MEDICATIONS  chlorhexidine 4% Liquid 1 Application(s) Topical <User Schedule>  enoxaparin Injectable 40 milliGRAM(s) SubCutaneous daily  fluPHENAZine 1 milliGRAM(s) Oral two times a day  nicotine - 21 mG/24Hr(s) Patch 1 patch Transdermal daily  traZODone 100 milliGRAM(s) Oral at bedtime    PRN MEDICATIONS    VITALS:   T(F): 96  HR: 90  BP: 109/68  RR: 18  SpO2: --    LABS:                        RADIOLOGY:  < from: CT Head No Cont (11.20.19 @ 22:32) >  Impression:     No CT evidence of acute intracranial pathology.        < end of copied text >    PHYSICAL EXAM:  GEN: No acute distress  HEENT: AT, NC, PERRLA  LUNGS: Clear to auscultation bilaterally   HEART: S1/S2 present. RRR.   ABD: Soft, non-tender, non-distended. Bowel sounds present  EXT: edema, clubbing, cyanosis absent  NEURO: AAOX2

## 2019-12-04 NOTE — PROGRESS NOTE ADULT - ASSESSMENT
64 yo female with no known PMH recently  with subsequent IPP admission in [place] in [place] and relocation to [place] with family after discharge. Patient with unclear psychiatric h/o, discharge dx acute psychosis, and remote h/o polysubstance abuse with no reported suicide attempts. Patient brought in by niece for a higher level of care with the family unable to provide appropriate supervision.    Patient does not know why she was brought to the hospital and denies any concerns. She answers most questions with "I don't know", unable to list her medications, psychiatric diagnosis/hospitalizations, reason for presentation.    #) Cognitive impairment? Acute psychosis secondary to polysubstance abuse?  -  passed away about a month ago, Grievance vs depression along with cognitive impairment   - fluPHENAZine   - traZODone   - patient was admitted for ipp in [place] and was later discharged with the diagnosis of acute psychosis, currently patient has no symptoms  - Niece mentioned patient is unable to take care of herself, and family is unable to care for her, need facility to care for her  - psych consulted two times now: both agreed patient does not need to be in Inpatient psychiatry,- TSH/b12/symphilis negative  - Due to cognitive impairment patient is increased risk for unintentional self-harm.  - Denies suicidal ideation    # smoker  -nicotine - 21 mG/24Hr(s) Patch 1 patch Transdermal daily    # HCV positive (negative)  - appears to have been treated in the past  - labs show high viral load for HCV, apparently treatment was not successful  -patient will need outpatient follow up with GI for treatment  - patient/family does not know      Activity: increase as tolerated  Gi ppx: no need  Dvt ppx: lovenox  Diet: mechanical soft diet  Dispo: pending placement ([facility] coming to evaluate the patient), pending  ( [place], then patient can get insurance in [place], case manger working on it)  Code: full

## 2019-12-04 NOTE — PROGRESS NOTE ADULT - ASSESSMENT
Patient is not at risk for suicide. However considering her cognitive impairment she is at risk for unintentional self harm. Hence would recommend her to be monitored closely.64 yo female with no known PMH recently  with subsequent IPP admission and relocation to [place] with family after discharge. Patient with unclear psychiatric h/o, discharge dx acute psychosis, and remote h/o polysubstance abuse with no reported suicide attempts. Patient brought in by niece for a higher level of care with the family unable to provide appropriate supervision.  Patient does not know why she was brought to the hospital and denies any concerns. She answers most questions with "I don't know", unable to list her medications, psychiatric diagnosis/hospitalizations, reason for presentation.    # Psychosis with cognitive decline  - H/o IPP admission for acut psychosis  - CT Head No Cont (11.20.19 @ 22:32) >No CT evidence of acute intracranial pathology.  - Vitamin B12, Serum: 719 pg/mL (11.21.19 @ 12:18)  - Treponema Pallidum Antibody Interpretation: Negative(11.21.19 @ 12:18)  - Thyroid Stimulating Hormone, Serum: 0.61 uIU/mL (11.21.19 @ 12:18)  - psych eval: Patient presentation appear consistent with neurocognitive impairment, increasing her risk for unintentional self-harm.she will likely benefit from a higher level of monitoring in the outpatient setting.  - psych F/u: Patient is not at risk for suicide. However considering her cognitive impairment she is at risk for unintentional self harm. Hence would recommend her to be monitored closely. She will benefit from being place in a long term care facility for dementia  - c/w fluphenazine, trazodone    # DVT prophylaxis    #Full code    #Pending: awaiting placement in long term care facility  # no family at bedside.   # Disposition: NH

## 2019-12-05 PROCEDURE — 99231 SBSQ HOSP IP/OBS SF/LOW 25: CPT

## 2019-12-05 RX ADMIN — Medication 100 MILLIGRAM(S): at 21:59

## 2019-12-05 RX ADMIN — ENOXAPARIN SODIUM 40 MILLIGRAM(S): 100 INJECTION SUBCUTANEOUS at 11:36

## 2019-12-05 RX ADMIN — Medication 1 PATCH: at 11:36

## 2019-12-05 RX ADMIN — FLUPHENAZINE HYDROCHLORIDE 1 MILLIGRAM(S): 1 TABLET, FILM COATED ORAL at 05:03

## 2019-12-05 RX ADMIN — FLUPHENAZINE HYDROCHLORIDE 1 MILLIGRAM(S): 1 TABLET, FILM COATED ORAL at 17:04

## 2019-12-05 RX ADMIN — Medication 1 PATCH: at 06:08

## 2019-12-05 NOTE — CHART NOTE - NSCHARTNOTEFT_GEN_A_CORE
Registered Dietitian Limited Follow Up:  -Pt reports adequate appetite and intake continue. PO intake remains 100% of mechanical soft diet, often requesting more food or snacks. Pt needs softer foods d/t poor dentition with dentures. no GI abnormalities noted. skin intact. no edema. Pt medically stable for d/c, pending placement in long term care facility d/t Psychosis with cognitive decline. no further nutrition interventions at this time.

## 2019-12-05 NOTE — PROGRESS NOTE ADULT - SUBJECTIVE AND OBJECTIVE BOX
LENGTH OF HOSPITAL STAY: 14d    CHIEF COMPLAINT:   Patient is a 63y old  Female who presents with a chief complaint of Change in mental status (04 Dec 2019 11:56)      HISTORY OF PRESENTING ILLNESS:    HPI:  Ms. Matos is 62 yo female with no known PMH recently  with subsequent IPP admission and relocation to [place] with family after discharge. Patient with unclear psychiatric h/o, discharge dx acute psychosis, and remote h/o polysubstance abuse with no reported suicide attempts. Patient brought in by niece for a higher level of care with the family unable to provide appropriate supervision.    Patient does not know why she was brought to the hospital and denies any concerns. She denies feeling sad or having any worries. She denies any perceptual disturbances. She denies any h/o trauma. She answers most questions with "I don't know", unable to list her medications, psychiatric diagnosis/hospitalizations, reason for presentation. Patient reports feeling safe at sister's home, but states that she doesn't do anything during the day.     Patient's niece reports that the hospital recommended patient be transferred to a long-term care facility; however, with family in [place] she was relocated. Niece reports that now patient appears to be in her chronic state of impairment, with loose associations and poor insight and recall. She states that the patient has been like this for years and is why the  had to provide close supervision; however, there is no one available in the family to provide this level of care now. The family reports that they are unable to safely supervise her.    Currently patient denies any chest pain or sob, no recent fevers or chills, no cough or congestion, denies hallucinations. Patient was evaluated by psychiatry, differential include: neurocognitive impairment, tbi, complex bereavement, psychosis. (21 Nov 2019 05:13)    PAST MEDICAL & SURGICAL HISTORY  PAST MEDICAL & SURGICAL HISTORY:  Psychosis    SOCIAL HISTORY:    ALLERGIES:  No Known Allergies    MEDICATIONS:  STANDING MEDICATIONS  chlorhexidine 4% Liquid 1 Application(s) Topical <User Schedule>  enoxaparin Injectable 40 milliGRAM(s) SubCutaneous daily  fluPHENAZine 1 milliGRAM(s) Oral two times a day  nicotine - 21 mG/24Hr(s) Patch 1 patch Transdermal daily  traZODone 100 milliGRAM(s) Oral at bedtime    PRN MEDICATIONS    VITALS:   T(F): 98.1  HR: 84  BP: 113/71  RR: 18  SpO2: 98%    LABS:                        RADIOLOGY:  < from: CT Head No Cont (11.20.19 @ 22:32) >  Impression:     No CT evidence of acute intracranial pathology.    < end of copied text >    PHYSICAL EXAM:  GEN: No acute distress  HEENT: AT, NC, PERRLA  LUNGS: Clear to auscultation bilaterally   HEART: S1/S2 present. RRR.   ABD: Soft, non-tender, non-distended. Bowel sounds present  EXT: edema, clubbing, cyanosis absent  NEURO: AAOX2  3

## 2019-12-05 NOTE — PROGRESS NOTE ADULT - SUBJECTIVE AND OBJECTIVE BOX
Patient is a 63y old  Female who presents with a chief complaint of Change in mental status (22 Nov 2019 10:00)    Patient was seen and examined.  Awaiting placement in long term facility.  Today denies any complaints.  no overnight events    PAST MEDICAL & SURGICAL HISTORY:  Psychosis    Allergies  No Known Allergies    MEDICATIONS  (STANDING):  chlorhexidine 4% Liquid 1 Application(s) Topical <User Schedule>  enoxaparin Injectable 40 milliGRAM(s) SubCutaneous daily  fluPHENAZine 1 milliGRAM(s) Oral two times a day  nicotine - 21 mG/24Hr(s) Patch 1 patch Transdermal daily  traZODone 100 milliGRAM(s) Oral at bedtime    MEDICATIONS  (PRN):    T(C): 36.7 (12-05-19 @ 06:29), Max: 36.7 (12-05-19 @ 06:29)  HR: 84 (12-05-19 @ 06:29) (79 - 85)  BP: 113/71 (12-05-19 @ 06:29) (111/62 - 128/72)  RR: 18 (12-05-19 @ 06:29) (18 - 20)  SpO2: 98% (12-04-19 @ 21:10) (98% - 98%)    O/E:  Awake, alert, not in distress.  HEENT: atraumatic, EOMI.  Chest: clear.  CVS: SIS2 +, no murmur.  P/A: Soft, BS+  CNS: non focal.  Ext: no edema feet.  Skin: no rash, no ulcers.  All systems reviewed positive findings as above.

## 2019-12-05 NOTE — PROGRESS NOTE ADULT - ASSESSMENT
Patient is not at risk for suicide. However considering her cognitive impairment she is at risk for unintentional self harm. Hence would recommend her to be monitored closely.64 yo female with no known PMH recently  with subsequent IPP admission and relocation to [place] with family after discharge. Patient with unclear psychiatric h/o, discharge dx acute psychosis, and remote h/o polysubstance abuse with no reported suicide attempts. Patient brought in by niece for a higher level of care with the family unable to provide appropriate supervision.  Patient does not know why she was brought to the hospital and denies any concerns. She answers most questions with "I don't know", unable to list her medications, psychiatric diagnosis/hospitalizations, reason for presentation.    # Psychosis with cognitive decline  - H/o IPP admission for acut psychosis  - CT Head No Cont (11.20.19 @ 22:32) >No CT evidence of acute intracranial pathology.  - Vitamin B12, Serum: 719 pg/mL (11.21.19 @ 12:18)  - Treponema Pallidum Antibody Interpretation: Negative(11.21.19 @ 12:18)  - Thyroid Stimulating Hormone, Serum: 0.61 uIU/mL (11.21.19 @ 12:18)  - psych eval: Patient presentation appear consistent with neurocognitive impairment, increasing her risk for unintentional self-harm.she will likely benefit from a higher level of monitoring in the outpatient setting.  - psych F/u: Patient is not at risk for suicide. However considering her cognitive impairment she is at risk for unintentional self harm. Hence would recommend her to be monitored closely. She will benefit from being place in a long term care facility for dementia  - c/w fluphenazine, trazodone  - reconsult psych- for inpatient psych    # DVT prophylaxis    #Full code    #Pending: awaiting placement in long term care facility  # no family at bedside.   # Disposition: NH

## 2019-12-05 NOTE — PROGRESS NOTE ADULT - ASSESSMENT
62 yo female with no known PMH recently  with subsequent IPP admission in [place] in [place] and relocation to [place] with family after discharge. Patient with unclear psychiatric h/o, discharge dx acute psychosis, and remote h/o polysubstance abuse with no reported suicide attempts. Patient brought in by niece for a higher level of care with the family unable to provide appropriate supervision.    Patient does not know why she was brought to the hospital and denies any concerns. She answers most questions with "I don't know", unable to list her medications, psychiatric diagnosis/hospitalizations, reason for presentation.    #) Cognitive impairment? Acute psychosis secondary to polysubstance abuse?  -  passed away about a month ago, Grievance vs depression along with cognitive impairment   - fluPHENAZine   - traZODone   - patient was admitted for ipp in [place] and was later discharged with the diagnosis of acute psychosis, currently patient has no symptoms  - Niece mentioned patient is unable to take care of herself, and family is unable to care for her, need facility to care for her  - psych consulted two times now: both agreed patient does not need to be in Inpatient psychiatry,- TSH/b12/symphilis negative  - Due to cognitive impairment patient is increased risk for unintentional self-harm.  - Denies suicidal ideation  -psych consulted for Eval    # smoker  -nicotine - 21 mG/24Hr(s) Patch 1 patch Transdermal daily    # HCV positive (negative)  - appears to have been treated in the past  - labs show high viral load for HCV, apparently treatment was not successful  -patient will need outpatient follow up with GI for treatment  - patient/family does not know      Activity: increase as tolerated  Gi ppx: no need  Dvt ppx: lovenox  Diet: mechanical soft diet  Dispo: pending placement ([facility] coming to evaluate the patient), pending  ( [place], then patient can get insurance in [place], case manger working on it)  Code: full

## 2019-12-06 PROCEDURE — 99231 SBSQ HOSP IP/OBS SF/LOW 25: CPT

## 2019-12-06 PROCEDURE — 99222 1ST HOSP IP/OBS MODERATE 55: CPT

## 2019-12-06 RX ADMIN — FLUPHENAZINE HYDROCHLORIDE 1 MILLIGRAM(S): 1 TABLET, FILM COATED ORAL at 17:51

## 2019-12-06 RX ADMIN — Medication 1 PATCH: at 12:30

## 2019-12-06 RX ADMIN — Medication 1 PATCH: at 07:39

## 2019-12-06 RX ADMIN — Medication 100 MILLIGRAM(S): at 21:14

## 2019-12-06 RX ADMIN — FLUPHENAZINE HYDROCHLORIDE 1 MILLIGRAM(S): 1 TABLET, FILM COATED ORAL at 06:07

## 2019-12-06 NOTE — CONSULT NOTE ADULT - ATTENDING COMMENTS
Patient examined this evening and has odd behaviors, frontal disinhibition, visuospatial and executive impairment.  Trouble with alternating hand sequences, mildly impaired memory.  To me, her head CT seems to show some frontal atrophy on left.    See recommendations above.

## 2019-12-06 NOTE — PROGRESS NOTE ADULT - SUBJECTIVE AND OBJECTIVE BOX
Patient is a 63y old  Female who presents with a chief complaint of Change in mental status (22 Nov 2019 10:00)    Patient was seen and examined.  Awaiting placement in long term facility.  Today denies any complaints.  no overnight events    PAST MEDICAL & SURGICAL HISTORY:  Psychosis    Allergies  No Known Allergies    MEDICATIONS  (STANDING):  chlorhexidine 4% Liquid 1 Application(s) Topical <User Schedule>  enoxaparin Injectable 40 milliGRAM(s) SubCutaneous daily  fluPHENAZine 1 milliGRAM(s) Oral two times a day  nicotine - 21 mG/24Hr(s) Patch 1 patch Transdermal daily  traZODone 100 milliGRAM(s) Oral at bedtime    MEDICATIONS  (PRN):    T(C): 36.8 (12-06-19 @ 05:37), Max: 36.8 (12-06-19 @ 05:37)  HR: 89 (12-06-19 @ 05:37) (77 - 98)  BP: 137/62 (12-06-19 @ 05:37) (117/71 - 137/62)  RR: 18 (12-06-19 @ 05:37) (18 - 20)  SpO2: --    O/E:  Awake, alert, not in distress.  HEENT: atraumatic, EOMI.  Chest: clear.  CVS: SIS2 +, no murmur.  P/A: Soft, BS+  CNS: non focal.  Ext: no edema feet.  Skin: no rash, no ulcers.  All systems reviewed positive findings as above.

## 2019-12-06 NOTE — PROGRESS NOTE BEHAVIORAL HEALTH - NSBHFUPINTERVALHXFT_PSY_A_CORE
Patient is a 62 yo female, recently  who was brought in by family for a higher level of care as the family was unable to provide appropriate level of care. Psychiatry has been consulted to evaluate the patient to see if she would be a candidate for acute inpatient unit.        Patient's psychiatric history is unclear but as per family she was heavily involved in drugs such as cocaine, heroine, percocet, valium, etc. In '91 after patient's mother passed away, she states that patient started seeing a psychiatrist who prescribed her xanax and klonopin. Since then, patient has been seeing a different psychiatrist every few months and getting prescribed different medications. She had an IPP immediately after her  passed away in [place] where she was diagnosed with psychosis NOS. As per family the patient is very limited at baseline and needed help from her  in terms of daily ADLs. As per family at baseline the patient is not oriented to situations or time.         Patient has been evaluated bedside. She is pleasant and denies any concerns. She denies feeling depressed. She denies any anxiety. She denies any AH,VH, paranoia. She is alert and oriented only to person. She is able to tell her birthday but is not oriented to date, time or situation. When asked why she is in the hospital she says " Am I dying".

## 2019-12-06 NOTE — PROGRESS NOTE ADULT - ASSESSMENT
Patient is not at risk for suicide. However considering her cognitive impairment she is at risk for unintentional self harm. Hence would recommend her to be monitored closely.62 yo female with no known PMH recently  with subsequent IPP admission and relocation to [place] with family after discharge. Patient with unclear psychiatric h/o, discharge dx acute psychosis, and remote h/o polysubstance abuse with no reported suicide attempts. Patient brought in by niece for a higher level of care with the family unable to provide appropriate supervision.  Patient does not know why she was brought to the hospital and denies any concerns. She answers most questions with "I don't know", unable to list her medications, psychiatric diagnosis/hospitalizations, reason for presentation.    # Psychosis with cognitive decline  - H/o IPP admission for acut psychosis  - CT Head No Cont (11.20.19 @ 22:32) >No CT evidence of acute intracranial pathology.  - Vitamin B12, Serum: 719 pg/mL (11.21.19 @ 12:18)  - Treponema Pallidum Antibody Interpretation: Negative(11.21.19 @ 12:18)  - Thyroid Stimulating Hormone, Serum: 0.61 uIU/mL (11.21.19 @ 12:18)  - psych eval: Patient presentation appear consistent with neurocognitive impairment, increasing her risk for unintentional self-harm.she will likely benefit from a higher level of monitoring in the outpatient setting.  - psych F/u: Patient is not at risk for suicide. However considering her cognitive impairment she is at risk for unintentional self harm. Hence would recommend her to be monitored closely. She will benefit from being place in a long term care facility for dementia  - c/w fluphenazine, trazodone  - reconsulted psych for F/u :  Consider neurology consult  for further work up of neurocognitive disorder  - neurology consulted      # DVT prophylaxis    #Full code    #Pending: awaiting placement in long term care facility  # left a message for pt's sister Roseanna for update  # Disposition: NH

## 2019-12-06 NOTE — PROGRESS NOTE BEHAVIORAL HEALTH - NSBHCONSULTRECOMMENDOTHER_PSY_A_CORE FT
Consider neurology consult  for further work up of neurocognitive disorder.   She might benefit from being place in a long term care facility for dementia. Consider neurology consult  for further work up of neurocognitive disorder.   She might benefit from being placed in a long term care facility for dementia.

## 2019-12-06 NOTE — PROGRESS NOTE ADULT - SUBJECTIVE AND OBJECTIVE BOX
LENGTH OF HOSPITAL STAY: 15d    CHIEF COMPLAINT:   Patient is a 63y old  Female who presents with a chief complaint of Change in mental status (05 Dec 2019 11:46)      HISTORY OF PRESENTING ILLNESS:    HPI:  Ms. Matos is 64 yo female with no known PMH recently  with subsequent IPP admission and relocation to [place] with family after discharge. Patient with unclear psychiatric h/o, discharge dx acute psychosis, and remote h/o polysubstance abuse with no reported suicide attempts. Patient brought in by niece for a higher level of care with the family unable to provide appropriate supervision.    Patient does not know why she was brought to the hospital and denies any concerns. She denies feeling sad or having any worries. She denies any perceptual disturbances. She denies any h/o trauma. She answers most questions with "I don't know", unable to list her medications, psychiatric diagnosis/hospitalizations, reason for presentation. Patient reports feeling safe at sister's home, but states that she doesn't do anything during the day.     Patient's niece reports that the hospital recommended patient be transferred to a long-term care facility; however, with family in [place] she was relocated. Niece reports that now patient appears to be in her chronic state of impairment, with loose associations and poor insight and recall. She states that the patient has been like this for years and is why the  had to provide close supervision; however, there is no one available in the family to provide this level of care now. The family reports that they are unable to safely supervise her.    Currently patient denies any chest pain or sob, no recent fevers or chills, no cough or congestion, denies hallucinations. Patient was evaluated by psychiatry, differential include: neurocognitive impairment, tbi, complex bereavement, psychosis. (21 Nov 2019 05:13)    PAST MEDICAL & SURGICAL HISTORY  PAST MEDICAL & SURGICAL HISTORY:  Psychosis    SOCIAL HISTORY:    ALLERGIES:  No Known Allergies    MEDICATIONS:  STANDING MEDICATIONS  chlorhexidine 4% Liquid 1 Application(s) Topical <User Schedule>  enoxaparin Injectable 40 milliGRAM(s) SubCutaneous daily  fluPHENAZine 1 milliGRAM(s) Oral two times a day  nicotine - 21 mG/24Hr(s) Patch 1 patch Transdermal daily  traZODone 100 milliGRAM(s) Oral at bedtime    PRN MEDICATIONS    VITALS:   T(F): 98.3  HR: 89  BP: 137/62  RR: 18  SpO2: --    LABS:                        RADIOLOGY:  < from: CT Head No Cont (11.20.19 @ 22:32) >  No CT evidence of acute intracranial pathology.    < end of copied text >    PHYSICAL EXAM:  GEN: No acute distress  HEENT: AT, NC, PERRLA  LUNGS: Clear to auscultation bilaterally   HEART: S1/S2 present. RRR.   ABD: Soft, non-tender, non-distended. Bowel sounds present  EXT: edema, clubbing, cyanosis absent  NEURO: AAOX2  3

## 2019-12-06 NOTE — CONSULT NOTE ADULT - ASSESSMENT
Patient is a 63 year old female with unknown PMH, possible psychiatric history and remote history of polysubstance abuse, presenting with confusion of unknown duration.     #Altered mental status  -Likely dementia, although she is young and does not clearly fit a specific dementia type- ?history of past trauma causing TBI?  -Patient was being cared for by her  until he --may have been deteriorating for years  -Non-contrast head CT  wnl  -Evaluated by psych, felt to not be psychiatric in origin  -Significant cognitive deficits impairing ability to care for self and live independently safely  -No motor or sensory deficits to indicate stroke  --At high risk of delirium--ensure she has her glasses with her, encourage family visits, window bed if possible, strict sleep/wake cycles, reorient frequently, control pain, hunger, bowel and bladder malfunction  --Patient would likely benefit from    Note is INCOMPLETE pending attending review -Non-contrast head CT  within normal limitsemale with unknown PMH, possible psychiatric history and remote history of polysubstance abuse, presenting with confusion of unknown duration.     #Altered mental status  -Likely dementia, although she is young and does not clearly fit a specific dementia type- ?history of past trauma causing TBI?  -Patient was being cared for by her  until he --may have been deteriorating for years  -Non-contrast head CT  wnl  -TSH, B12 ok, syphilis negative  -Evaluated by psych, felt to not be psychiatric in origin  -Significant cognitive deficits impairing ability to care for self and live independently safely  -No motor or sensory deficits to indicate stroke  --At high risk of delirium--ensure she has her glasses with her, encourage family visits, window bed if possible, strict sleep/wake cycles, reorient frequently, control pain, hunger, bowel and bladder malfunction  --Please get vitamin D, Folate, as part of dementia workup  --Patient would likely benefit from    Note is INCOMPLETE pending attending review -Non-contrast head CT  within normal limitsemale with unknown PMH, possible psychiatric history and remote history of polysubstance abuse, presenting with confusion of unknown duration.     #Suspect frontotemporal dementia  -Possibly with comorbid psychiatric condition  -Patient was being cared for by her  until he --may have been deteriorating for years  -Non-contrast head CT with some frontal atrophy  -TSH, B12 ok, syphilis negative  -Significant cognitive deficits impairing ability to care for self and live independently safely  --Consider SSRI--coordinate with psychiatry  --At high risk of delirium--ensure she has her glasses with her, encourage family visits, window bed if possible, strict sleep/wake cycles, reorient frequently, control pain, hunger, bowel and bladder malfunction  --Consider outpatient PET to confirm diagnosis -Non-contrast head CT  within normal limitsemale with unknown PMH, possible psychiatric history and remote history of polysubstance abuse, presenting with confusion of unknown duration.     #Suspect frontotemporal dementia  -Possibly with comorbid psychiatric condition  -Patient was being cared for by her  until he --may have been deteriorating for years  -Non-contrast head CT with some frontal atrophy  -TSH, B12 ok, syphilis negative  -Significant cognitive deficits impairing ability to care for self and live independently safely  --Consider SSRI--coordinate with psychiatry  --At high risk of delirium--ensure she has her glasses with her, encourage family visits, window bed if possible, strict sleep/wake cycles, reorient frequently, control pain, hunger, bowel and bladder malfunction  --Consider outpatient FDG-PET to confirm diagnosis

## 2019-12-06 NOTE — PROGRESS NOTE BEHAVIORAL HEALTH - NSBHCHARTREVIEWVS_PSY_A_CORE FT
Vital Signs Last 24 Hrs  T(C): 36.8 (06 Dec 2019 05:37), Max: 36.8 (06 Dec 2019 05:37)  T(F): 98.3 (06 Dec 2019 05:37), Max: 98.3 (06 Dec 2019 05:37)  HR: 89 (06 Dec 2019 05:37) (77 - 98)  BP: 137/62 (06 Dec 2019 05:37) (117/71 - 137/62)  RR: 18 (06 Dec 2019 05:37) (18 - 20)

## 2019-12-06 NOTE — PROGRESS NOTE BEHAVIORAL HEALTH - SUMMARY
Patient is a 62 yo female, recently  who was brought in by family for a higher level of care as the family was unable to provide appropriate level of care. Psychiatry has been consulted to evaluate the patient to see if she would be a candidate for acute inpatient unit.             Patient reportedly has been given a diagnosis of Psychosis NOS and was started on Fluphenazine immediately after her  passed away. It couple possibly be due to atypical bereavement. Her prior psychiatric history is unclear, however it does not appear that she was on any psychiatric medications recently. ON evaluation she denies symptoms of depression, anxiety and does not display any over symptoms of psychosis. Based on history obtained from family she reportedly is limited cognitively at baseline requiring help with her ADLs.            Her current presentation is not consistent with acute psychiatric illness and she does not appear to benefit from Inpatient Psychiatric admission. Patient is a 62 yo female, recently  who was brought in by family for a higher level of care as the family was unable to provide appropriate level of care. Psychiatry has been consulted to evaluate the patient to see if she would be a candidate for acute inpatient unit.             Patient reportedly has been given a diagnosis of Psychosis NOS and was started on Fluphenazine immediately after her  passed away. It couple possibly be due to atypical bereavement. Her prior psychiatric history is unclear, however it does not appear that she was on any psychiatric medications until recently. On evaluation she denies symptoms of depression, anxiety and does not display any over symptoms of psychosis. Based on history obtained from family she reportedly is limited cognitively at baseline requiring help with her ADLs.            Her current presentation is not consistent with acute psychiatric illness and she does not appear to benefit from Inpatient Psychiatric admission.

## 2019-12-06 NOTE — CONSULT NOTE ADULT - SUBJECTIVE AND OBJECTIVE BOX
Neurology Consult    Patient is a 63y old  Female who presents with a chief complaint of Change in mental status (06 Dec 2019 12:49)      HPI:  Ms. Matos is 64 yo female with no known PMH recently  with subsequent IPP admission and relocation to [place] with family after discharge. Patient with unclear psychiatric h/o, discharge dx acute psychosis, and remote h/o polysubstance abuse with no reported suicide attempts. Patient brought in by niece for a higher level of care with the family unable to provide appropriate supervision.    Neurology consulted for concern about possible dementia.    On my interview, Patient does not know why she was brought to the hospital and denies any concerns other than repeatedly asking, "Am I dying?" She answers most questions by saying "I don't know" and when asked to guess, says that she can't. She denies feeling confused. She is able to tell me that her   recently of a heart attack and endorses that he took care of her. When asked what she needed help with, she states, "nothing!". When asked about medications, she states that she took "pills" for many years. When asked which pills, she can name Klonopin but does not know the names of the other pills. She denies weakness, numbness, tingling.       PAST MEDICAL & SURGICAL HISTORY:  Psychosis      FAMILY HISTORY:      Social History: (-) x 3    Allergies    No Known Allergies    Intolerances        MEDICATIONS  (STANDING):  chlorhexidine 4% Liquid 1 Application(s) Topical <User Schedule>  enoxaparin Injectable 40 milliGRAM(s) SubCutaneous daily  fluPHENAZine 1 milliGRAM(s) Oral two times a day  nicotine - 21 mG/24Hr(s) Patch 1 patch Transdermal daily  traZODone 100 milliGRAM(s) Oral at bedtime    MEDICATIONS  (PRN):      Review of systems:    Constitutional: as per HPI  Eyes: No eye pain or discharge  ENMT:  No difficulty hearing; No sinus or throat pain  Neck: No pain or stiffness  Respiratory: No cough, wheezing, chills or hemoptysis  Cardiovascular: No chest pain, palpitations, shortness of breath, dyspnea on exertion  Gastrointestinal: No abdominal pain, nausea, vomiting or hematemesis; No diarrhea or constipation.   Genitourinary: No dysuria, frequency, hematuria or incontinence  Neurological: As per HPI  Skin: No rashes or lesions   Endocrine: No heat or cold intolerance; No hair loss  Musculoskeletal: No joint pain or swelling  Psychiatric: No depression, anxiety, mood swings  Heme/Lymph: No easy bruising or bleeding gums    Vital Signs Last 24 Hrs  T(C): 36.8 (06 Dec 2019 05:37), Max: 36.8 (06 Dec 2019 05:37)  T(F): 98.3 (06 Dec 2019 05:37), Max: 98.3 (06 Dec 2019 05:37)  HR: 89 (06 Dec 2019 05:37) (77 - 98)  BP: 137/62 (06 Dec 2019 05:37) (117/71 - 137/62)  BP(mean): --  RR: 18 (06 Dec 2019 05:37) (18 - 20)  SpO2: --    Examination:  General:  Appearance is consistent with chronologic age.  No abnormal facies.  Gross skin survey within normal limits. Repeatedly breathes noisily before stopping to ask if she is dying.   Cognitive/Language:  The patient is oriented to person only, but does have the cognitive ability to look for clues as to where she is and the year, such as checking the sign on the wall. Recent and remote memory impaired.  Fund of knowledge is intact and normal.  Language with normal repetition, comprehension and naming.  Nondysarthric.  Cannot subtract 3's, cannot spell "world" forward or backward. Clock drawn is abnormal--missing hands, numbers 2-6 distributed around the periphery and repeated. Low frustration tolerance and impaired ability to follow instructions beyond very simple one-step instructions.  Eyes: Difficult to assess because patient cannot cooperate, but appears to have intact VA, VFF.  EOMI w/o nystagmus. PERRL.  No ptosis/weakness of eyelid closure.    Face:  Facial sensation normal V1 - 3, no facial asymmetry.    Ears/Nose/Throat:  Hearing grossly intact b/l.  Palate elevates midline.  Tongue and uvula midline.   Motor examination:   Normal tone, bulk and range of motion.  No tenderness, twitching, tremors or involuntary movements.  Formal Muscle Strength Testing: (MRC grade R/L) 5/5 UE; 5/5 LE.  No observable drift.  Reflexes:   2+ b/l pectoralis, biceps, triceps, brachioradialis, patella and Achilles.    Sensory examination:   Intact to light touch in all extremities.  Cerebellum: No dysmetria or dysdiadokinesia.  Gait narrow based and normal.    Respiratory:  no audible wheezing or inspiratory stridor.  no use of accessory muscles. On room air.  Cardiac: pulse palpable      [facility]     19 @ 13:51 Neurology Consult    Patient is a 63y old  Female who presents with a chief complaint of Change in mental status (06 Dec 2019 12:49)      HPI:  Ms. Matos is 62 yo female with no known PMH recently  with subsequent IPP admission and relocation to [place] with family after discharge. Patient with unclear psychiatric h/o, discharge dx acute psychosis, and remote h/o polysubstance abuse with no reported suicide attempts. Patient brought in by niece for a higher level of care with the family unable to provide appropriate supervision.    Neurology consulted for concern about possible dementia.    On my interview, Patient does not know why she was brought to the hospital and denies any concerns other than repeatedly asking, "Am I dying?" She answers most questions by saying "I don't know" and when asked to guess, says that she can't. She denies feeling confused. She is able to tell me that her   recently of a heart attack and endorses that he took care of her. When asked what she needed help with, she states, "nothing!". When asked about medications, she states that she took "pills" for many years. When asked which pills, she can name Klonopin but does not know the names of the other pills. She denies weakness, numbness, tingling.       PAST MEDICAL & SURGICAL HISTORY:  Psychosis      FAMILY HISTORY:      Social History: (-) x 3    Allergies    No Known Allergies    Intolerances        MEDICATIONS  (STANDING):  chlorhexidine 4% Liquid 1 Application(s) Topical <User Schedule>  enoxaparin Injectable 40 milliGRAM(s) SubCutaneous daily  fluPHENAZine 1 milliGRAM(s) Oral two times a day  nicotine - 21 mG/24Hr(s) Patch 1 patch Transdermal daily  traZODone 100 milliGRAM(s) Oral at bedtime    MEDICATIONS  (PRN):      Review of systems:    Constitutional: as per HPI  Eyes: No eye pain or discharge  ENMT:  No difficulty hearing; No sinus or throat pain  Neck: No pain or stiffness  Respiratory: No cough, wheezing, chills or hemoptysis  Cardiovascular: No chest pain, palpitations, shortness of breath, dyspnea on exertion  Gastrointestinal: No abdominal pain, nausea, vomiting or hematemesis; No diarrhea or constipation.   Genitourinary: No dysuria, frequency, hematuria or incontinence  Neurological: As per HPI  Skin: No rashes or lesions   Endocrine: No heat or cold intolerance; No hair loss  Musculoskeletal: No joint pain or swelling  Psychiatric: No depression, anxiety, mood swings  Heme/Lymph: No easy bruising or bleeding gums    Vital Signs Last 24 Hrs  T(C): 36.8 (06 Dec 2019 05:37), Max: 36.8 (06 Dec 2019 05:37)  T(F): 98.3 (06 Dec 2019 05:37), Max: 98.3 (06 Dec 2019 05:37)  HR: 89 (06 Dec 2019 05:37) (77 - 98)  BP: 137/62 (06 Dec 2019 05:37) (117/71 - 137/62)  BP(mean): --  RR: 18 (06 Dec 2019 05:37) (18 - 20)  SpO2: --    Examination:  General:  Appearance is consistent with chronologic age.  No abnormal facies.  Gross skin survey within normal limits. Repeatedly breathes noisily before stopping to ask if she is dying.   Cognitive/Language:  The patient is oriented to person only, but does have the cognitive ability to look for clues as to where she is and the year, such as checking the sign on the wall. Recent and remote memory somewhat impaired.  Fund of knowledge is intact and normal.  Language with normal comprehension, able to read, some difficulty with naming.  Nondysarthric.  Cannot subtract 3's, cannot spell "world" forward or backward--misses a letter. Clock drawn is abnormal, disorganized. Comments are inappropriate for setting. Low frustration tolerance and impaired ability to follow instructions. Some apraxia evident.  Eyes: Difficult to assess because patient cannot cooperate, but appears to have intact VA, VFF.  EOMI w/o nystagmus. PERRL.  No ptosis/weakness of eyelid closure.    Face:  Facial sensation normal V1 - 3, no facial asymmetry.    Ears/Nose/Throat:  Hearing grossly intact b/l.  Palate elevates midline.  Tongue and uvula midline.   Motor examination:   Normal tone, bulk and range of motion.  No tenderness, twitching, tremors or involuntary movements.  Formal Muscle Strength Testing: (MRC grade R/L) 5/5 UE; 5/5 LE.  No observable drift.  Reflexes:   2+ b/l pectoralis, biceps, triceps, brachioradialis, patella and Achilles.    Sensory examination:   Intact to light touch in all extremities.  Cerebellum: No dysmetria or dysdiadokinesia.  Gait narrow based and normal.    Respiratory:  no audible wheezing or inspiratory stridor.  no use of accessory muscles. On room air.  Cardiac: pulse palpable      [facility]     19 @ 13:51 Neurology Consult    Patient is a 63y old  Female who presents with a chief complaint of Change in mental status (06 Dec 2019 12:49)      HPI:  Ms. Matos is 62 yo female with no known PMH recently  with subsequent IPP admission and relocation to [place] with family after discharge. Patient with unclear psychiatric h/o, discharge dx acute psychosis, and remote h/o polysubstance abuse with no reported suicide attempts. Patient brought in by niece for a higher level of care with the family unable to provide appropriate supervision.    Neurology consulted for concern about possible dementia.    On my interview, Patient does not know why she was brought to the hospital and denies any concerns other than repeatedly asking, "Am I dying?" She answers most questions by saying "I don't know" and when asked to guess, says that she can't. She denies feeling confused. She is able to tell me that her   recently of a heart attack and endorses that he took care of her. When asked what she needed help with, she states, "nothing!". When asked about medications, she states that she took "pills" for many years. When asked which pills, she can name Klonopin but does not know the names of the other pills. She denies weakness, numbness, tingling.       PAST MEDICAL & SURGICAL HISTORY:  Psychosis      FAMILY HISTORY:      Social History: (-) x 3    Allergies    No Known Allergies    Intolerances        MEDICATIONS  (STANDING):  chlorhexidine 4% Liquid 1 Application(s) Topical <User Schedule>  enoxaparin Injectable 40 milliGRAM(s) SubCutaneous daily  fluPHENAZine 1 milliGRAM(s) Oral two times a day  nicotine - 21 mG/24Hr(s) Patch 1 patch Transdermal daily  traZODone 100 milliGRAM(s) Oral at bedtime    MEDICATIONS  (PRN):      Review of systems:    Constitutional: as per HPI  Eyes: No eye pain or discharge  ENMT:  No difficulty hearing; No sinus or throat pain  Neck: No pain or stiffness  Respiratory: No cough, wheezing, chills or hemoptysis  Cardiovascular: No chest pain, palpitations, shortness of breath, dyspnea on exertion  Gastrointestinal: No abdominal pain, nausea, vomiting or hematemesis; No diarrhea or constipation.   Genitourinary: No dysuria, frequency, hematuria or incontinence  Neurological: As per HPI  Skin: No rashes or lesions   Endocrine: No heat or cold intolerance; No hair loss  Musculoskeletal: No joint pain or swelling  Psychiatric: No depression, anxiety, mood swings  Heme/Lymph: No easy bruising or bleeding gums    Vital Signs Last 24 Hrs  T(C): 36.8 (06 Dec 2019 05:37), Max: 36.8 (06 Dec 2019 05:37)  T(F): 98.3 (06 Dec 2019 05:37), Max: 98.3 (06 Dec 2019 05:37)  HR: 89 (06 Dec 2019 05:37) (77 - 98)  BP: 137/62 (06 Dec 2019 05:37) (117/71 - 137/62)  BP(mean): --  RR: 18 (06 Dec 2019 05:37) (18 - 20)  SpO2: --    Examination:  General:  Appearance is consistent with chronologic age.  No abnormal facies.  Gross skin survey within normal limits. Repeatedly breathes noisily before stopping to ask if she is dying.   Cognitive/Language:  The patient is oriented to person only, but does have the cognitive ability to look for clues as to where she is and the year, such as checking the sign on the wall. Recent and remote memory somewhat impaired.  Fund of knowledge is intact and normal.  Language with normal comprehension, able to read, some difficulty with naming.  Nondysarthric.  Cannot subtract 3's, cannot spell "world" forward or backward--misses a letter. Clock drawn is abnormal, disorganized. Comments are inappropriate for setting. Low frustration tolerance and impaired ability to follow instructions. Some apraxia evident.  Eyes: Difficult to assess because patient cannot cooperate, but appears to have intact VA, VFF.  EOMI w/o nystagmus. PERRL.  No ptosis/weakness of eyelid closure.    Face:  Facial sensation normal V1 - 3, no facial asymmetry.    Ears/Nose/Throat:  Hearing grossly intact b/l.  Palate elevates midline.  Tongue and uvula midline.   Motor examination:   Normal tone, bulk and range of motion.  No tenderness, twitching, tremors or involuntary movements.  Formal Muscle Strength Testing: (MRC grade R/L) 5/5 UE; 5/5 LE.  No observable drift.  Reflexes:   2+ b/l pectoralis, biceps, triceps, brachioradialis, patella and Achilles.    Sensory examination:   Intact to light touch in all extremities.  Cerebellum: No dysmetria or dysdiadokinesia.  Gait narrow based and normal.    Respiratory:  no audible wheezing or inspiratory stridor.  no use of accessory muscles. On room air.  Cardiac: pulse palpable    19 @ 13:51

## 2019-12-06 NOTE — PROGRESS NOTE BEHAVIORAL HEALTH - NSBHCONSULTMEDS_PSY_A_CORE FT
COntinue Fluphenazine 1 mg BID for now. The need for this can be assessed eventually in an outpatient setting.  Trazodone PRN for insomnia. Continue Fluphenazine 1 mg BID for now. The need for this can be assessed eventually in an outpatient setting.  Trazodone PRN for insomnia.

## 2019-12-06 NOTE — PROGRESS NOTE ADULT - ASSESSMENT
64 yo female with no known PMH recently  with subsequent IPP admission in [place] in [place] and relocation to [place] with family after discharge. Patient with unclear psychiatric h/o, discharge dx acute psychosis, and remote h/o polysubstance abuse with no reported suicide attempts. Patient brought in by niece for a higher level of care with the family unable to provide appropriate supervision.    Patient does not know why she was brought to the hospital and denies any concerns. She answers most questions with "I don't know", unable to list her medications, psychiatric diagnosis/hospitalizations, reason for presentation.    #) Cognitive impairment? Acute psychosis secondary to polysubstance abuse?  -  passed away about a month ago, Grievance vs depression along with cognitive impairment   - fluPHENAZine   - traZODone   - patient was admitted for ipp in [place] and was later discharged with the diagnosis of acute psychosis, currently patient has no symptoms  - Niece mentioned patient is unable to take care of herself, and family is unable to care for her, need facility to care for her  - psych consulted two times now: both agreed patient does not need to be in Inpatient psychiatry,- TSH/b12/symphilis negative  - Due to cognitive impairment patient is increased risk for unintentional self-harm.  - Denies suicidal ideation  -psych consulted for Eval    # smoker  -nicotine - 21 mG/24Hr(s) Patch 1 patch Transdermal daily    # HCV positive (negative)  - appears to have been treated in the past  - labs show high viral load for HCV, apparently treatment was not successful  -patient will need outpatient follow up with GI for treatment  - patient/family does not know      Activity: increase as tolerated  Gi ppx: no need  Dvt ppx: lovenox  Diet: mechanical soft diet  Dispo: pending placement , patient needs admission to inpatient psych before transfer to another psychiatric facility. Nursing home denying authorization for patient  Code: full

## 2019-12-07 LAB
FOLATE SERPL-MCNC: 4.5 NG/ML — LOW
HCV NS5 MUT DET ISLT GENOTYP: SIGNIFICANT CHANGE UP
REF LAB TEST METHOD: SIGNIFICANT CHANGE UP
TSH SERPL-MCNC: 1.44 UIU/ML — SIGNIFICANT CHANGE UP (ref 0.27–4.2)

## 2019-12-07 PROCEDURE — 99231 SBSQ HOSP IP/OBS SF/LOW 25: CPT

## 2019-12-07 RX ADMIN — FLUPHENAZINE HYDROCHLORIDE 1 MILLIGRAM(S): 1 TABLET, FILM COATED ORAL at 05:07

## 2019-12-07 RX ADMIN — Medication 100 MILLIGRAM(S): at 21:19

## 2019-12-07 RX ADMIN — Medication 1 PATCH: at 11:22

## 2019-12-07 RX ADMIN — Medication 1 PATCH: at 11:25

## 2019-12-07 RX ADMIN — ENOXAPARIN SODIUM 40 MILLIGRAM(S): 100 INJECTION SUBCUTANEOUS at 11:24

## 2019-12-07 RX ADMIN — FLUPHENAZINE HYDROCHLORIDE 1 MILLIGRAM(S): 1 TABLET, FILM COATED ORAL at 17:10

## 2019-12-07 RX ADMIN — CHLORHEXIDINE GLUCONATE 1 APPLICATION(S): 213 SOLUTION TOPICAL at 05:07

## 2019-12-07 NOTE — PROGRESS NOTE ADULT - ASSESSMENT
Uncontrolled with hyperglycemia  Continue Lantus 30  Start on insulin sliding scale with serial Accu-Checks  Check hemoglobin A1c  Hypoglycemic protocol in place       64 yo female with no known PMH recently  with subsequent IPP admission in [place] in [place] and relocation to [place] with family after discharge. Patient with unclear psychiatric h/o, discharge dx acute psychosis, and remote h/o polysubstance abuse with no reported suicide attempts. Patient brought in by niece for a higher level of care with the family unable to provide appropriate supervision.    Patient does not know why she was brought to the hospital and denies any concerns. She answers most questions with "I don't know", unable to list her medications, psychiatric diagnosis/hospitalizations, reason for presentation.    #) Cognitive impairment? Acute psychosis secondary to polysubstance abuse?  -  passed away about a month ago, Grievance vs depression along with cognitive impairment   - fluPHENAZine   - traZODone   - patient was admitted for ipp in [place] and was later discharged with the diagnosis of acute psychosis, currently patient has no symptoms  - Niece mentioned patient is unable to take care of herself, and family is unable to care for her, need facility to care for her  - psych consulted two times now: both agreed patient does not need to be in Inpatient psychiatry,- TSH/b12/symphilis negative  - Due to cognitive impairment patient is increased risk for unintentional self-harm.  - Denies suicidal ideation  - patient requires placement in  long term care for her dementia  - TSH - WNL    # smoker  -nicotine - 21 mG/24Hr(s) Patch 1 patch Transdermal daily    # HCV positive (negative)  - appears to have been treated in the past  - labs show high viral load for HCV, apparently treatment was not successful  -patient will need outpatient follow up with GI for treatment  - patient/family does not know      Activity: increase as tolerated  Gi ppx: no need  Dvt ppx: lovenox  Diet: mechanical soft diet  Dispo: pending placement , patient needs admission to inpatient psych before transfer to another psychiatric facility. Nursing home denying authorization for patient  Code: full

## 2019-12-07 NOTE — PROGRESS NOTE ADULT - SUBJECTIVE AND OBJECTIVE BOX
KAROLINA LOWERYHLEEN  63y  Female      Patient is a 63y old  Female who presents with a chief complaint of Change in mental status (06 Dec 2019 13:50)      INTERVAL HPI/OVERNIGHT EVENTS:  She asked me if Am I dying, she denies any pain.   Vital Signs Last 24 Hrs  T(C): 35.8 (07 Dec 2019 05:19), Max: 36.2 (06 Dec 2019 13:52)  T(F): 96.5 (07 Dec 2019 05:19), Max: 97.2 (06 Dec 2019 13:52)  HR: 94 (07 Dec 2019 05:19) (81 - 94)  BP: 127/59 (07 Dec 2019 05:19) (114/55 - 127/59)  BP(mean): --  RR: 18 (07 Dec 2019 05:19) (18 - 20)  SpO2: --      12-06-19 @ 07:01  -  12-07-19 @ 07:00  --------------------------------------------------------  IN: 360 mL / OUT: 0 mL / NET: 360 mL            Consultant(s) Notes Reviewed:  [x ] YES  [ ] NO          MEDICATIONS  (STANDING):  chlorhexidine 4% Liquid 1 Application(s) Topical <User Schedule>  enoxaparin Injectable 40 milliGRAM(s) SubCutaneous daily  fluPHENAZine 1 milliGRAM(s) Oral two times a day  nicotine - 21 mG/24Hr(s) Patch 1 patch Transdermal daily  traZODone 100 milliGRAM(s) Oral at bedtime    MEDICATIONS  (PRN):      LABS                  Lactate Trend        CAPILLARY BLOOD GLUCOSE            RADIOLOGY & ADDITIONAL TESTS:    Imaging Personally Reviewed:  [ ] YES  [ ] NO    HEALTH ISSUES - PROBLEM Dx:  Neurocognitive disorder        PHYSICAL EXAM:  GENERAL: NAD, well-developed  HEAD:  Atraumatic, Normocephalic  EYES: EOMI, PERRLA, conjunctiva and sclera clear  NECK: Supple, No JVD  CHEST/LUNG: Clear to auscultation bilaterally; No wheeze  HEART: Regular rate and rhythm; No murmurs, rubs, or gallops  ABDOMEN: Soft, Nontender, Nondistended; Bowel sounds present  EXTREMITIES:  2+ Peripheral Pulses, No clubbing, cyanosis, or edema  PSYCH: alert, disoriented to time and place  NEUROLOGY: non-focal  SKIN: No rashes or lesions

## 2019-12-07 NOTE — PROGRESS NOTE ADULT - ASSESSMENT
64 yo female with no known PMH recently  with subsequent IPP admission and relocation to [place] with family after discharge. Patient with unclear psychiatric h/o, discharge dx acute psychosis, and remote h/o polysubstance abuse with no reported suicide attempts. Patient brought in by niece for a higher level of care with the family unable to provide appropriate supervision.    A/P:     Psychosis with neurocognitive impairment:   Seen by Psych, no need for inpatient. Not at risk of suicidal, possible unintentional harm due to cognitive impairment   Continue Fluphenazine     Dementia:   Seen by neurology.   CT Head No Cont No CT evidence of acute intracranial pathology.  B12, TSH and syphilis are ok.     Full code    #Pending: awaiting placement in long term care facility    # Disposition: NH

## 2019-12-07 NOTE — PROGRESS NOTE ADULT - SUBJECTIVE AND OBJECTIVE BOX
LENGTH OF HOSPITAL STAY: 16d    CHIEF COMPLAINT:   Patient is a 63y old  Female who presents with a chief complaint of Change in mental status (06 Dec 2019 13:50)      HISTORY OF PRESENTING ILLNESS:    HPI:  Ms. Matos is 64 yo female with no known PMH recently  with subsequent IPP admission and relocation to [place] with family after discharge. Patient with unclear psychiatric h/o, discharge dx acute psychosis, and remote h/o polysubstance abuse with no reported suicide attempts. Patient brought in by niece for a higher level of care with the family unable to provide appropriate supervision.    Patient does not know why she was brought to the hospital and denies any concerns. She denies feeling sad or having any worries. She denies any perceptual disturbances. She denies any h/o trauma. She answers most questions with "I don't know", unable to list her medications, psychiatric diagnosis/hospitalizations, reason for presentation. Patient reports feeling safe at sister's home, but states that she doesn't do anything during the day.     Patient's niece reports that the hospital recommended patient be transferred to a long-term care facility; however, with family in [place] she was relocated. Niece reports that now patient appears to be in her chronic state of impairment, with loose associations and poor insight and recall. She states that the patient has been like this for years and is why the  had to provide close supervision; however, there is no one available in the family to provide this level of care now. The family reports that they are unable to safely supervise her.    Currently patient denies any chest pain or sob, no recent fevers or chills, no cough or congestion, denies hallucinations. Patient was evaluated by psychiatry, differential include: neurocognitive impairment, tbi, complex bereavement, psychosis. (21 Nov 2019 05:13)    PAST MEDICAL & SURGICAL HISTORY  PAST MEDICAL & SURGICAL HISTORY:  Psychosis    SOCIAL HISTORY:    ALLERGIES:  No Known Allergies    MEDICATIONS:  STANDING MEDICATIONS  chlorhexidine 4% Liquid 1 Application(s) Topical <User Schedule>  enoxaparin Injectable 40 milliGRAM(s) SubCutaneous daily  fluPHENAZine 1 milliGRAM(s) Oral two times a day  nicotine - 21 mG/24Hr(s) Patch 1 patch Transdermal daily  traZODone 100 milliGRAM(s) Oral at bedtime    PRN MEDICATIONS    VITALS:   T(F): 96.5  HR: 94  BP: 127/59  RR: 18  SpO2: --    LABS:                        RADIOLOGY:  < from: CT Head No Cont (11.20.19 @ 22:32) >    Impression:     No CT evidence of acute intracranial pathology.      < end of copied text >    PHYSICAL EXAM:  GEN: No acute distress  HEENT: AT, NC, PERRLA  LUNGS: Clear to auscultation bilaterally   HEART: S1/S2 present. RRR.   ABD: Soft, non-tender, non-distended. Bowel sounds present  EXT: edema, clubbing, cyanosis absent  NEURO: AAOX2  3

## 2019-12-08 PROCEDURE — 99231 SBSQ HOSP IP/OBS SF/LOW 25: CPT

## 2019-12-08 RX ADMIN — FLUPHENAZINE HYDROCHLORIDE 1 MILLIGRAM(S): 1 TABLET, FILM COATED ORAL at 05:22

## 2019-12-08 RX ADMIN — Medication 1 PATCH: at 11:28

## 2019-12-08 RX ADMIN — ENOXAPARIN SODIUM 40 MILLIGRAM(S): 100 INJECTION SUBCUTANEOUS at 11:25

## 2019-12-08 RX ADMIN — FLUPHENAZINE HYDROCHLORIDE 1 MILLIGRAM(S): 1 TABLET, FILM COATED ORAL at 17:10

## 2019-12-08 RX ADMIN — Medication 100 MILLIGRAM(S): at 19:32

## 2019-12-08 NOTE — PROGRESS NOTE ADULT - SUBJECTIVE AND OBJECTIVE BOX
BHAVESH LOWERY  63y  Female      Patient is a 63y old  Female who presents with a chief complaint of Change in mental status (07 Dec 2019 10:23)      INTERVAL HPI/OVERNIGHT EVENTS:  no acute events.   Vital Signs Last 24 Hrs  T(C): 36.7 (08 Dec 2019 05:23), Max: 36.7 (08 Dec 2019 05:23)  T(F): 98.1 (08 Dec 2019 05:23), Max: 98.1 (08 Dec 2019 05:23)  HR: 77 (08 Dec 2019 05:23) (75 - 89)  BP: 109/56 (08 Dec 2019 05:23) (109/56 - 115/56)  BP(mean): 81 (07 Dec 2019 13:48) (81 - 81)  RR: 18 (08 Dec 2019 05:23) (18 - 18)  SpO2: 96% (07 Dec 2019 13:48) (96% - 96%)            Consultant(s) Notes Reviewed:  [x ] YES  [ ] NO          MEDICATIONS  (STANDING):  chlorhexidine 4% Liquid 1 Application(s) Topical <User Schedule>  enoxaparin Injectable 40 milliGRAM(s) SubCutaneous daily  fluPHENAZine 1 milliGRAM(s) Oral two times a day  nicotine - 21 mG/24Hr(s) Patch 1 patch Transdermal daily  traZODone 100 milliGRAM(s) Oral at bedtime    MEDICATIONS  (PRN):      LABS                  Lactate Trend        CAPILLARY BLOOD GLUCOSE            RADIOLOGY & ADDITIONAL TESTS:    Imaging Personally Reviewed:  [ ] YES  [ ] NO    HEALTH ISSUES - PROBLEM Dx:  Neurocognitive disorder        PHYSICAL EXAM:  GENERAL: NAD, well-developed  HEAD:  Atraumatic, Normocephalic  EYES: EOMI, PERRLA, conjunctiva and sclera clear  NECK: Supple, No JVD  CHEST/LUNG: Clear to auscultation bilaterally; No wheeze  HEART: Regular rate and rhythm; No murmurs, rubs, or gallops  ABDOMEN: Soft, Nontender, Nondistended; Bowel sounds present  EXTREMITIES:  2+ Peripheral Pulses, No clubbing, cyanosis, or edema  PSYCH: alert, disoriented to time and place  NEUROLOGY: non-focal  SKIN: No rashes or lesions

## 2019-12-08 NOTE — PROGRESS NOTE ADULT - ASSESSMENT
62 yo female with no known PMH recently  with subsequent IPP admission and relocation to [place] with family after discharge. Patient with unclear psychiatric h/o, discharge dx acute psychosis, and remote h/o polysubstance abuse with no reported suicide attempts. Patient brought in by niece for a higher level of care with the family unable to provide appropriate supervision.    A/P:   Psychosis with neurocognitive impairment:   Seen by Psych, no need for inpatient. Not at risk of suicidal, possible unintentional harm due to cognitive impairment   Continue Fluphenazine     Dementia:   Seen by neurology.   CT Head No Cont No CT evidence of acute intracranial pathology.  B12, TSH and syphilis are ok.     Full code    #Pending: awaiting placement in long term care facility    # Disposition: NH

## 2019-12-09 PROCEDURE — 99232 SBSQ HOSP IP/OBS MODERATE 35: CPT

## 2019-12-09 RX ORDER — FOLIC ACID 0.8 MG
1 TABLET ORAL DAILY
Refills: 0 | Status: DISCONTINUED | OUTPATIENT
Start: 2019-12-09 | End: 2019-12-12

## 2019-12-09 RX ADMIN — Medication 100 MILLIGRAM(S): at 21:12

## 2019-12-09 RX ADMIN — Medication 1 PATCH: at 11:04

## 2019-12-09 RX ADMIN — Medication 1 MILLIGRAM(S): at 12:25

## 2019-12-09 RX ADMIN — FLUPHENAZINE HYDROCHLORIDE 1 MILLIGRAM(S): 1 TABLET, FILM COATED ORAL at 05:06

## 2019-12-09 RX ADMIN — FLUPHENAZINE HYDROCHLORIDE 1 MILLIGRAM(S): 1 TABLET, FILM COATED ORAL at 17:04

## 2019-12-09 NOTE — PROGRESS NOTE ADULT - ASSESSMENT
64 yo female with no known PMH recently  with subsequent IPP admission in [place] in [place] and relocation to [place] with family after discharge. Patient with unclear psychiatric h/o, discharge dx acute psychosis, and remote h/o polysubstance abuse with no reported suicide attempts. Patient brought in by niece for a higher level of care with the family unable to provide appropriate supervision.    Patient does not know why she was brought to the hospital and denies any concerns. She answers most questions with "I don't know", unable to list her medications, psychiatric diagnosis/hospitalizations, reason for presentation.    #) Cognitive impairment? Acute psychosis secondary to polysubstance abuse?  -  passed away about a month ago, Grievance vs depression along with cognitive impairment   - fluPHENAZine   - traZODone   - patient was admitted for ipp in [place] and was later discharged with the diagnosis of acute psychosis, currently patient has no symptoms  - Niece mentioned patient is unable to take care of herself, and family is unable to care for her, need facility to care for her  - psych consulted two times now: both agreed patient does not need to be in Inpatient psychiatry,- TSH/b12/symphilis negative  - Due to cognitive impairment patient is increased risk for unintentional self-harm.  - Neurology recommends findings consistent with fronto-temporal dementia  - Denies suicidal ideation  - patient requires placement in  long term care for her dementia  - TSH - WNL    # smoker  -nicotine - 21 mG/24Hr(s) Patch 1 patch Transdermal daily    # HCV positive (negative)  - appears to have been treated in the past  - labs show high viral load for HCV, apparently treatment was not successful  -patient will need outpatient follow up with GI for treatment  - patient/family does not know      Activity: increase as tolerated  Gi ppx: no need  Dvt ppx: lovenox  Diet: mechanical soft diet  Dispo: pending placement , patient needs admission to inpatient psych before transfer to another psychiatric facility. Nursing home denying authorization for patient  Code: full

## 2019-12-09 NOTE — PROGRESS NOTE ADULT - SUBJECTIVE AND OBJECTIVE BOX
LENGTH OF HOSPITAL STAY: 18d    CHIEF COMPLAINT:   Patient is a 63y old  Female who presents with a chief complaint of Change in mental status (08 Dec 2019 09:31)      HISTORY OF PRESENTING ILLNESS:    HPI:  Ms. Matos is 62 yo female with no known PMH recently  with subsequent IPP admission and relocation to [place] with family after discharge. Patient with unclear psychiatric h/o, discharge dx acute psychosis, and remote h/o polysubstance abuse with no reported suicide attempts. Patient brought in by niece for a higher level of care with the family unable to provide appropriate supervision.    Patient does not know why she was brought to the hospital and denies any concerns. She denies feeling sad or having any worries. She denies any perceptual disturbances. She denies any h/o trauma. She answers most questions with "I don't know", unable to list her medications, psychiatric diagnosis/hospitalizations, reason for presentation. Patient reports feeling safe at sister's home, but states that she doesn't do anything during the day.     Patient's niece reports that the hospital recommended patient be transferred to a long-term care facility; however, with family in [place] she was relocated. Niece reports that now patient appears to be in her chronic state of impairment, with loose associations and poor insight and recall. She states that the patient has been like this for years and is why the  had to provide close supervision; however, there is no one available in the family to provide this level of care now. The family reports that they are unable to safely supervise her.    Currently patient denies any chest pain or sob, no recent fevers or chills, no cough or congestion, denies hallucinations. Patient was evaluated by psychiatry, differential include: neurocognitive impairment, tbi, complex bereavement, psychosis. (21 Nov 2019 05:13)    PAST MEDICAL & SURGICAL HISTORY  PAST MEDICAL & SURGICAL HISTORY:  Psychosis    SOCIAL HISTORY:    ALLERGIES:  No Known Allergies    MEDICATIONS:  STANDING MEDICATIONS  chlorhexidine 4% Liquid 1 Application(s) Topical <User Schedule>  enoxaparin Injectable 40 milliGRAM(s) SubCutaneous daily  fluPHENAZine 1 milliGRAM(s) Oral two times a day  folic acid 1 milliGRAM(s) Oral daily  nicotine - 21 mG/24Hr(s) Patch 1 patch Transdermal daily  traZODone 100 milliGRAM(s) Oral at bedtime    PRN MEDICATIONS    VITALS:   T(F): 98  HR: 104  BP: 109/69  RR: 18  SpO2: --    LABS:                        RADIOLOGY:    PHYSICAL EXAM:  GEN: No acute distress  HEENT: AT, NC, PERRLA  LUNGS: Clear to auscultation bilaterally   HEART: S1/S2 present. RRR.   ABD: Soft, non-tender, non-distended. Bowel sounds present  EXT: edema, clubbing, cyanosis absent  NEURO: AAOX2  3

## 2019-12-09 NOTE — PROGRESS NOTE ADULT - ASSESSMENT
Patient is not at risk for suicide. However considering her cognitive impairment she is at risk for unintentional self harm. Hence would recommend her to be monitored closely.64 yo female with no known PMH recently  with subsequent IPP admission and relocation to [place] with family after discharge. Patient with unclear psychiatric h/o, discharge dx acute psychosis, and remote h/o polysubstance abuse with no reported suicide attempts. Patient brought in by niece for a higher level of care with the family unable to provide appropriate supervision.  Patient does not know why she was brought to the hospital and denies any concerns. She answers most questions with "I don't know", unable to list her medications, psychiatric diagnosis/hospitalizations, reason for presentation.    # Psychosis with cognitive decline with possible dementia  - H/o IPP admission for acut psychosis  - CT Head No Cont (11.20.19 @ 22:32) >No CT evidence of acute intracranial pathology.  - Vitamin B12, Serum: 719 pg/mL (11.21.19 @ 12:18)  - Treponema Pallidum Antibody Interpretation: Negative(11.21.19 @ 12:18)  - Thyroid Stimulating Hormone, Serum: 0.61 uIU/mL (11.21.19 @ 12:18)  - psych eval: Patient presentation appear consistent with neurocognitive impairment, increasing her risk for unintentional self-harm.she will likely benefit from a higher level of monitoring in the outpatient setting.  - psych F/u: Patient is not at risk for suicide. However considering her cognitive impairment she is at risk for unintentional self harm. Hence would recommend her to be monitored closely. She will benefit from being place in a long term care facility for dementia  - c/w fluphenazine, trazodone  - reconsulted psych for F/u :  Consider neurology consult  for further work up of neurocognitive disorder  - neuro eval: Suspect frontotemporal dementia    # DVT prophylaxis    #Full code    #Pending: awaiting placement in long term care facility  # left a message for pt's sister Roseanna for update  # Disposition: NH

## 2019-12-09 NOTE — PROGRESS NOTE ADULT - SUBJECTIVE AND OBJECTIVE BOX
Patient is a 63y old  Female who presents with a chief complaint of Change in mental status (22 Nov 2019 10:00)    Patient was seen and examined.  Awaiting placement in long term facility.  Today denies any complaints.  no overnight events    PAST MEDICAL & SURGICAL HISTORY:  Psychosis    Allergies  No Known Allergies    MEDICATIONS  (STANDING):  chlorhexidine 4% Liquid 1 Application(s) Topical <User Schedule>  enoxaparin Injectable 40 milliGRAM(s) SubCutaneous daily  fluPHENAZine 1 milliGRAM(s) Oral two times a day  folic acid 1 milliGRAM(s) Oral daily  nicotine - 21 mG/24Hr(s) Patch 1 patch Transdermal daily  traZODone 100 milliGRAM(s) Oral at bedtime    MEDICATIONS  (PRN):    Vital Signs Last 24 Hrs  T(C): 36.7  T(F): 98  HR: 104  BP: 109/69  BP(mean): 85  RR: 18  SpO2: --    O/E:  Awake, alert, not in distress.  HEENT: atraumatic, EOMI.  Chest: clear.  CVS: SIS2 +, no murmur.  P/A: Soft, BS+  CNS: non focal.  Ext: no edema feet.  Skin: no rash, no ulcers.  All systems reviewed positive findings as above.

## 2019-12-10 LAB
HCV GENTYP SERPL NAA+PROBE: SIGNIFICANT CHANGE UP
HCV NS3 MUT DET ISLT GENOTYP: SIGNIFICANT CHANGE UP
HEPATITIS C VIRUS (HCV) GENOSURE (R) NS3/4A INTERPRETATION: SIGNIFICANT CHANGE UP

## 2019-12-10 PROCEDURE — 93010 ELECTROCARDIOGRAM REPORT: CPT

## 2019-12-10 PROCEDURE — 99232 SBSQ HOSP IP/OBS MODERATE 35: CPT | Mod: GC

## 2019-12-10 PROCEDURE — 99233 SBSQ HOSP IP/OBS HIGH 50: CPT

## 2019-12-10 RX ORDER — QUETIAPINE FUMARATE 200 MG/1
50 TABLET, FILM COATED ORAL AT BEDTIME
Refills: 0 | Status: DISCONTINUED | OUTPATIENT
Start: 2019-12-10 | End: 2019-12-12

## 2019-12-10 RX ADMIN — QUETIAPINE FUMARATE 50 MILLIGRAM(S): 200 TABLET, FILM COATED ORAL at 21:05

## 2019-12-10 RX ADMIN — Medication 1 MILLIGRAM(S): at 11:18

## 2019-12-10 RX ADMIN — Medication 1 PATCH: at 21:06

## 2019-12-10 RX ADMIN — Medication 1 PATCH: at 06:26

## 2019-12-10 RX ADMIN — Medication 1 PATCH: at 11:18

## 2019-12-10 RX ADMIN — FLUPHENAZINE HYDROCHLORIDE 1 MILLIGRAM(S): 1 TABLET, FILM COATED ORAL at 06:22

## 2019-12-10 RX ADMIN — CHLORHEXIDINE GLUCONATE 1 APPLICATION(S): 213 SOLUTION TOPICAL at 06:21

## 2019-12-10 NOTE — PROGRESS NOTE ADULT - ASSESSMENT
Patient is not at risk for suicide. However considering her cognitive impairment she is at risk for unintentional self harm. Hence would recommend her to be monitored closely.64 yo female with no known PMH recently  with subsequent IPP admission and relocation to [place] with family after discharge. Patient with unclear psychiatric h/o, discharge dx acute psychosis, and remote h/o polysubstance abuse with no reported suicide attempts. Patient brought in by niece for a higher level of care with the family unable to provide appropriate supervision.  Patient does not know why she was brought to the hospital and denies any concerns. She answers most questions with "I don't know", unable to list her medications, psychiatric diagnosis/hospitalizations, reason for presentation.    # Psychosis with cognitive decline with possible dementia  - H/o IPP admission for acut psychosis  - CT Head No Cont (11.20.19 @ 22:32) >No CT evidence of acute intracranial pathology.  - Vitamin B12, Serum: 719 pg/mL (11.21.19 @ 12:18)  - Treponema Pallidum Antibody Interpretation: Negative(11.21.19 @ 12:18)  - Thyroid Stimulating Hormone, Serum: 0.61 uIU/mL (11.21.19 @ 12:18)  - psych eval: Patient presentation appear consistent with neurocognitive impairment, increasing her risk for unintentional self-harm.she will likely benefit from a higher level of monitoring in the outpatient setting.  - psych F/u: Patient is not at risk for suicide. However considering her cognitive impairment she is at risk for unintentional self harm. Hence would recommend her to be monitored closely. She will benefit from being place in a long term care facility for dementia  - c/w fluphenazine, trazodone  - reconsulted psych for F/u :  Consider neurology consult  for further work up of neurocognitive disorder  - neuro eval: Suspect frontotemporal dementia  - Psych reeval for agitation    # Folic acid deficiency  - c/w folic acid supplement    # Hep c RNA positive  - outpt F/u with GI      # DVT prophylaxis    #Full code    #Pending: awaiting placement in long term care facility  # no family at bedside  # Disposition: NH

## 2019-12-10 NOTE — PROGRESS NOTE BEHAVIORAL HEALTH - NSBHCHARTREVIEWIMAGING_PSY_A_CORE FT
EXAM:  CT BRAIN        PROCEDURE DATE:  11/20/2019    INTERPRETATION:  Clinical History / Reason for exam: Psychosis.  Technique: Multiple contiguous axial CT images of the head were obtained    from the base of the skull to the vertex without administration of   intravenous contrast. Coronal and sagittal reformats were obtained.  Findings:  The ventricles, basal cisterns and sulcal pattern are within normal   limits for the patient's stated age.    Patchy periventricular and deep white matter hypodensities consistent   with chronic microvascular ischemic changes.  Grey-white differentiation is preserved.  There is no acute mass effect, midline shift or intracranial hemorrhage.    The calvariumis intact.  The visualized paranasal sinuses and bilateral mastoid complexes are   unremarkable. The globes and orbits are unremarkable.  Beam hardening artifact is noted overlying the brain stem and posterior   fossa which is inherent to CT in this location.  Impression: No CT evidence of acute intracranial pathology.
< from: CT Head No Cont (11.20.19 @ 22:32) >    Impression:     No CT evidence of acute intracranial pathology.    < end of copied text >

## 2019-12-10 NOTE — PROGRESS NOTE ADULT - SUBJECTIVE AND OBJECTIVE BOX
Patient is a 63y old  Female who presents with a chief complaint of Change in mental status (22 Nov 2019 10:00)    Patient was seen and examined.  Awaiting placement in long term facility.  As per staff pt is more agitated, after her family visited her this weekend.    PAST MEDICAL & SURGICAL HISTORY:  Psychosis    Allergies  No Known Allergies    MEDICATIONS  (STANDING):  chlorhexidine 4% Liquid 1 Application(s) Topical <User Schedule>  fluPHENAZine 1 milliGRAM(s) Oral two times a day  folic acid 1 milliGRAM(s) Oral daily  nicotine - 21 mG/24Hr(s) Patch 1 patch Transdermal daily  traZODone 100 milliGRAM(s) Oral at bedtime    MEDICATIONS  (PRN):    T(C): 36.7 (12-10-19 @ 06:52), Max: 36.8 (12-09-19 @ 14:27)  HR: 85 (12-10-19 @ 06:52) (77 - 85)  BP: 102/53 (12-10-19 @ 06:52) (102/53 - 135/65)  RR: 18 (12-10-19 @ 06:52) (18 - 18)  SpO2: 96% (12-10-19 @ 06:52) (96% - 98%)    O/E:  Awake, alert, not in distress.  HEENT: atraumatic, EOMI.  Chest: clear.  CVS: SIS2 +, no murmur.  P/A: Soft, BS+  CNS: non focal.  Ext: no edema feet.  Skin: no rash, no ulcers.  All systems reviewed positive findings as above.

## 2019-12-10 NOTE — PROGRESS NOTE BEHAVIORAL HEALTH - NSBHFUPINTERVALHXFT_PSY_A_CORE
Since last evaluation, primary team notes that patient has been sleeping poorly, pacing the unit floor during the night, and increasingly more agitated, with no improvement in her presentation.  Psychiatry consult was recalled for recommendation for agitation.      On approach, patient is seen walking around the unit floor, intrusive, asking staff members "Am I dying?" repeatedly.  She is alert, calm and cooperative, however, disoriented and anxious appearing and has difficulty meaningful engaging in interview.  She appears disorganized in her behavior and answers questions with "One life to live?" and "Am I going to die" and "can I shower" with sporadic panting in between her answers.  She is unable to state the year accurately and states date is "December 1, 1908."  Further interview is limited due to patient's seeming neurocognitive disorder vs. psychosis vs. comorbidity of both.    Attempts to obtain collateral from patient's son, Torey Moura ([phone]), who lives in [place], and previous psychiatrist in [place], Dr. Marci Berkowitz ([phone]) unsuccessful.  Message and call back number provided. Since last evaluation, primary team notes that patient has been sleeping poorly, pacing the unit floor during the night, and increasingly more agitated, with no improvement in her presentation.  Psychiatry consult was recalled for recommendation for agitation.      On approach, patient is seen walking around the unit floor, intrusive, asking staff members "Am I dying?" repeatedly.  She is alert, calm and cooperative, however, disoriented and anxious appearing and has difficulty meaningful engaging in interview.  She appears disorganized in her behavior and answers questions with "One life to live?" and "Am I going to die" and "can I shower" with sporadic panting in between her answers.  She is unable to state the year accurately and states date is "December 1, 1908."  Further interview is limited due to patient's seeming neurocognitive disorder vs. psychosis vs. comorbidity of both.    Attempts to obtain collateral from patient's son, Torey Matos ([phone]), who lives in [place], and previous psychiatrist in [place], Dr. Marci Berkowitz ([phone]) unsuccessful.  Message and call back number provided.

## 2019-12-10 NOTE — PROGRESS NOTE BEHAVIORAL HEALTH - NSBHCHARTREVIEWLAB_PSY_A_CORE FT
Complete Blood Count in AM (11.25.19 @ 06:34)    Nucleated RBC: 0 /100 WBCs    WBC Count: 7.08 K/uL    RBC Count: 4.75 M/uL    Hemoglobin: 14.9 g/dL    Hematocrit: 42.5 %    Mean Cell Volume: 89.5 fL    Mean Cell Hemoglobin: 31.4 pg    Mean Cell Hemoglobin Conc: 35.1 g/dL    Red Cell Distrib Width: 12.1 %    Platelet Count - Automated: 212 K/uL    Comprehensive Metabolic Panel in AM (11.25.19 @ 06:34)    Sodium, Serum: 139 mmol/L    Potassium, Serum: 3.8 mmol/L    Chloride, Serum: 99 mmol/L    Carbon Dioxide, Serum: 24 mmol/L    Anion Gap, Serum: 16 mmol/L    Blood Urea Nitrogen, Serum: 8 mg/dL    Creatinine, Serum: 0.6 mg/dL    Glucose, Serum: 103 mg/dL    Calcium, Total Serum: 9.3 mg/dL    Protein Total, Serum: 8.5 g/dL    Albumin, Serum: 4.5 g/dL    Bilirubin Total, Serum: 1.0 mg/dL    Alkaline Phosphatase, Serum: 71 U/L    Aspartate Aminotransferase (AST/SGOT): 45 U/L    Alanine Aminotransferase (ALT/SGPT): 51 U/L

## 2019-12-10 NOTE — PROGRESS NOTE BEHAVIORAL HEALTH - CASE SUMMARY
Patient is a 63 y o  female, previously domiciled with  in [place] then relocated to [place] to be cared for by family after 's passing, with history of polysubstance abuse (?) and unclear psychiatric hx, most recent IPP after her  passed away. She was brought in by family due to inability to care for patient and was admitted for placement to a higher level of care.    Psychiatry was initially consulted to evaluate for appropriateness for inpatient psychiatric unit and patient did not appear to meet criteria at that time, as her family reported that this has been her baseline for a long period of time indicating her situation to be more of a neurocognitive disorder.   Psychiatry has been reconsulted for agitation. ON evaluation today pt appears restless, anxious and slightly more disorganized. However her orientation and her memory appear to be the same. Attempt to get collateral information from son and ? previous psychiatrist was unsuccessful.  Currently as pt appears to have sleep problems and increased disorganization will recommend to d/c Prolixin, trazodone and start Seroquel 50 mg QHS. Will also add Seroquel 25 mg Q8 PRN for agitation.

## 2019-12-10 NOTE — PROGRESS NOTE BEHAVIORAL HEALTH - RISK ASSESSMENT
Patient has chronic elevated risk of self harm due to impulsivity and neurocognitive status, however, risk is mitigated by lack of previous suicide attempts, lack of current suicidal ideation, intent or plan, and medication compliance.  Given mitigating factors, patient at this time does not appear to be at imminent risk for self harm.
No h/o suicide attempt, no current suicidal ideation.

## 2019-12-10 NOTE — PROGRESS NOTE BEHAVIORAL HEALTH - SUMMARY
Patient is a 63 y o  female, previously domiciled with  in [place] then relocated to [place] to be cared for by family after 's passing, with history of polysubstance abuse (?) and unclear psychiatric hx (Bipolar Disorder vs. Acute Psychosis), recently discharged from inpatient psychiatric unit for decompensation, with apparent f/u with psychiatrist in [place] prior to admission, was brought in by family due to inability to care for patient and was admitted for placement to a higher level of care.    Psychiatry was initially consulted to evaluate for appropriateness for inpatient psychiatric unit and patient did not appear to meet criteria at that time.  Today, psychiatry was reconsulted for worsening sleep and increasing agitation.      At time of interview, patient was alert and calm and attempted to cooperate with interview, which was limited due to patient's cognitive vs. psychiatric mental status.  On mental status exam, she is disoriented, with repetitive, disorganized behavior, and appears anxious, perseverative and is with preoccupations.  It is unclear at this time whether patient's presentation is due solely to a neurocognitive disorder, underlying psychiatric issue or a combination of both.  More collateral about patient's baseline and chronology of psychiatrist history are required before making further assessment and disposition planning.  In the meantime, patient appears not to be sleeping and is increasingly agitated on her current medication regimen.  In light of poor efficacy, we recommend switch to regimen below.      Recommendations:   -d/c Trazodone and Prolixin  -starts Seroquel 50mg PO qhs   -on 12.11.19, can add Seroquel 25mg PO PRN q8h for agitation   -will follow up on 12.11.19 and reattempt contact with son and psychiatrist for additional information.

## 2019-12-10 NOTE — PROGRESS NOTE BEHAVIORAL HEALTH - NSBHCHARTREVIEWVS_PSY_A_CORE FT
Vital Signs Last 24 Hrs  T(C): 36.2 (10 Dec 2019 12:22), Max: 36.7 (10 Dec 2019 06:52)  T(F): 97.2 (10 Dec 2019 12:22), Max: 98 (10 Dec 2019 06:52)  HR: 87 (10 Dec 2019 12:22) (77 - 87)  BP: 122/64 (10 Dec 2019 12:22) (102/53 - 135/65)  BP(mean): 85 (10 Dec 2019 12:22) (85 - 85)  RR: 189 (10 Dec 2019 12:22) (18 - 189)  SpO2: 97% (10 Dec 2019 12:22) (96% - 97%)

## 2019-12-10 NOTE — PROGRESS NOTE ADULT - ASSESSMENT
62 yo female with no known PMH recently  with subsequent IPP admission in [place] in [place] and relocation to [place] with family after discharge. Patient with unclear psychiatric h/o, discharge dx acute psychosis, and remote h/o polysubstance abuse with no reported suicide attempts. Patient brought in by niece for a higher level of care with the family unable to provide appropriate supervision.    Patient does not know why she was brought to the hospital and denies any concerns. She answers most questions with "I don't know", unable to list her medications, psychiatric diagnosis/hospitalizations, reason for presentation.    #) Cognitive impairment? Acute psychosis secondary to polysubstance abuse?  -  passed away about a month ago, Grievance vs depression along with cognitive impairment   - fluPHENAZine   - traZODone   - patient was admitted for ipp in [place] and was later discharged with the diagnosis of acute psychosis, currently patient has no symptoms  - Niece mentioned patient is unable to take care of herself, and family is unable to care for her, need facility to care for her  - psych consulted two times now: both agreed patient does not need to be in Inpatient psychiatry,- TSH/b12/symphilis negative  - Due to cognitive impairment patient is increased risk for unintentional self-harm.  - Neurology recommends findings consistent with fronto-temporal dementia  - Denies suicidal ideation  - patient requires placement in  long term care for her dementia  - TSH - WNL    # smoker  -nicotine - 21 mG/24Hr(s) Patch 1 patch Transdermal daily    # HCV positive (negative)  - appears to have been treated in the past  - labs show high viral load for HCV, apparently treatment was not successful  -patient will need outpatient follow up with GI for treatment  - patient/family does not know      Activity: increase as tolerated  Gi ppx: no need  Dvt ppx: lovenox  Diet: mechanical soft diet  Dispo: pending placement , patient needs admission to inpatient psych before transfer to another psychiatric facility. Nursing home denying authorization for patient  Code: full

## 2019-12-10 NOTE — PROGRESS NOTE ADULT - SUBJECTIVE AND OBJECTIVE BOX
LENGTH OF HOSPITAL STAY: 19d    CHIEF COMPLAINT:   Patient is a 63y old  Female who presents with a chief complaint of Change in mental status (09 Dec 2019 13:57)      HISTORY OF PRESENTING ILLNESS:    HPI:  Ms. Matos is 64 yo female with no known PMH recently  with subsequent IPP admission and relocation to [place] with family after discharge. Patient with unclear psychiatric h/o, discharge dx acute psychosis, and remote h/o polysubstance abuse with no reported suicide attempts. Patient brought in by niece for a higher level of care with the family unable to provide appropriate supervision.    Patient does not know why she was brought to the hospital and denies any concerns. She denies feeling sad or having any worries. She denies any perceptual disturbances. She denies any h/o trauma. She answers most questions with "I don't know", unable to list her medications, psychiatric diagnosis/hospitalizations, reason for presentation. Patient reports feeling safe at sister's home, but states that she doesn't do anything during the day.     Patient's niece reports that the hospital recommended patient be transferred to a long-term care facility; however, with family in [place] she was relocated. Niece reports that now patient appears to be in her chronic state of impairment, with loose associations and poor insight and recall. She states that the patient has been like this for years and is why the  had to provide close supervision; however, there is no one available in the family to provide this level of care now. The family reports that they are unable to safely supervise her.    Currently patient denies any chest pain or sob, no recent fevers or chills, no cough or congestion, denies hallucinations. Patient was evaluated by psychiatry, differential include: neurocognitive impairment, tbi, complex bereavement, psychosis. (21 Nov 2019 05:13)    PAST MEDICAL & SURGICAL HISTORY  PAST MEDICAL & SURGICAL HISTORY:  Psychosis    SOCIAL HISTORY:    ALLERGIES:  No Known Allergies    MEDICATIONS:  STANDING MEDICATIONS  chlorhexidine 4% Liquid 1 Application(s) Topical <User Schedule>  enoxaparin Injectable 40 milliGRAM(s) SubCutaneous daily  fluPHENAZine 1 milliGRAM(s) Oral two times a day  folic acid 1 milliGRAM(s) Oral daily  nicotine - 21 mG/24Hr(s) Patch 1 patch Transdermal daily  traZODone 100 milliGRAM(s) Oral at bedtime    PRN MEDICATIONS    VITALS:   T(F): 98  HR: 85  BP: 102/53  RR: 18  SpO2: 96%    LABS:                        RADIOLOGY:    PHYSICAL EXAM:  GEN: No acute distress  HEENT: AT, NC, PERRLA  LUNGS: Clear to auscultation bilaterally   HEART: S1/S2 present. RRR.   ABD: Soft, non-tender, non-distended. Bowel sounds present  EXT: edema, clubbing, cyanosis absent  NEURO: AAOX2  3

## 2019-12-11 PROCEDURE — 99233 SBSQ HOSP IP/OBS HIGH 50: CPT

## 2019-12-11 RX ORDER — LANOLIN ALCOHOL/MO/W.PET/CERES
5 CREAM (GRAM) TOPICAL ONCE
Refills: 0 | Status: COMPLETED | OUTPATIENT
Start: 2019-12-11 | End: 2019-12-11

## 2019-12-11 RX ORDER — QUETIAPINE FUMARATE 200 MG/1
25 TABLET, FILM COATED ORAL EVERY 8 HOURS
Refills: 0 | Status: DISCONTINUED | OUTPATIENT
Start: 2019-12-11 | End: 2019-12-12

## 2019-12-11 RX ORDER — DIPHENHYDRAMINE HCL 50 MG
25 CAPSULE ORAL DAILY
Refills: 0 | Status: DISCONTINUED | OUTPATIENT
Start: 2019-12-11 | End: 2019-12-12

## 2019-12-11 RX ADMIN — QUETIAPINE FUMARATE 50 MILLIGRAM(S): 200 TABLET, FILM COATED ORAL at 21:08

## 2019-12-11 RX ADMIN — QUETIAPINE FUMARATE 25 MILLIGRAM(S): 200 TABLET, FILM COATED ORAL at 15:20

## 2019-12-11 RX ADMIN — Medication 1 PATCH: at 12:27

## 2019-12-11 RX ADMIN — QUETIAPINE FUMARATE 25 MILLIGRAM(S): 200 TABLET, FILM COATED ORAL at 07:25

## 2019-12-11 RX ADMIN — Medication 1 MILLIGRAM(S): at 12:27

## 2019-12-11 RX ADMIN — Medication 25 MILLIGRAM(S): at 21:57

## 2019-12-11 NOTE — PROGRESS NOTE ADULT - SUBJECTIVE AND OBJECTIVE BOX
Patient is a 63y old  Female who presents with a chief complaint of Change in mental status (22 Nov 2019 10:00)    Patient was seen and examined.  Awaiting placement in long term facility.  overnight pt was very agitated - received seroquel.    PAST MEDICAL & SURGICAL HISTORY:  Psychosis    Allergies  No Known Allergies    MEDICATIONS  (STANDING):  chlorhexidine 4% Liquid 1 Application(s) Topical <User Schedule>  folic acid 1 milliGRAM(s) Oral daily  nicotine - 21 mG/24Hr(s) Patch 1 patch Transdermal daily  QUEtiapine 50 milliGRAM(s) Oral at bedtime    MEDICATIONS  (PRN):  QUEtiapine 25 milliGRAM(s) Oral every 8 hours PRN Agitation    T(C): 36.7 (12-11-19 @ 06:38), Max: 36.7 (12-11-19 @ 06:38)  HR: 92 (12-11-19 @ 06:38) (87 - 92)  BP: 131/64 (12-11-19 @ 06:38) (122/64 - 131/64)  RR: 18 (12-11-19 @ 06:38) (18 - 189)  SpO2: 98% (12-11-19 @ 06:38) (97% - 98%)    O/E:  Awake, alert, not in distress.  HEENT: atraumatic, EOMI.  Chest: clear.  CVS: SIS2 +, no murmur.  P/A: Soft, BS+  CNS: non focal.  Psych: Repetitive, disorganized behavior,  with preoccupations.  Ext: no edema feet.  Skin: no rash, no ulcers.  All systems reviewed positive findings as above.

## 2019-12-11 NOTE — PROGRESS NOTE ADULT - ASSESSMENT
62 yo female with no known PMH recently  with subsequent IPP admission in [place] in [place] and relocation to [place] with family after discharge. Patient with unclear psychiatric h/o, discharge dx acute psychosis, and remote h/o polysubstance abuse with no reported suicide attempts. Patient brought in by niece for a higher level of care with the family unable to provide appropriate supervision.    Patient does not know why she was brought to the hospital and denies any concerns. She answers most questions with "I don't know", unable to list her medications, psychiatric diagnosis/hospitalizations, reason for presentation.    Patient seen to be much more agitated and aggressive today and over the period of last night    #) Cognitive impairment? Acute psychosis secondary to polysubstance abuse?  -  passed away about a month ago, Grievance vs depression along with cognitive impairment   - D/C fluPHENAZine , traZODone , start start Seroquel 50 mg QHS. Will also add Seroquel 25 mg Q8 PRN for agitation.  - patient was admitted for ipp in [place] and was later discharged with the diagnosis of acute psychosis, - Niece mentioned patient is unable to take care of herself, and family is unable to care for her, need facility to care for her  - psych consulted two times now: both agreed patient does not need to be in Inpatient psychiatry,- TSH/b12/symphilis negative  - Due to cognitive impairment patient is increased risk for unintentional self-harm.  - Neurology recommends findings consistent with fronto-temporal dementia  - Denies suicidal ideation  - patient requires placement in  long term care for her dementia  - TSH - WNL    # smoker  -nicotine - 21 mG/24Hr(s) Patch 1 patch Transdermal daily    # HCV positive (negative)  - appears to have been treated in the past  - labs show high viral load for HCV, apparently treatment was not successful  -patient will need outpatient follow up with GI for treatment  - patient/family does not know      Activity: increase as tolerated  Gi ppx: no need  Dvt ppx: lovenox  Diet: mechanical soft diet  Dispo: pending placement , patient needs admission to inpatient psych before transfer to another psychiatric facility. Nursing home denying authorization for patient  Code: full

## 2019-12-11 NOTE — PROGRESS NOTE ADULT - SUBJECTIVE AND OBJECTIVE BOX
LENGTH OF HOSPITAL STAY: 20d    CHIEF COMPLAINT:   Patient is a 63y old  Female who presents with a chief complaint of Change in mental status (10 Dec 2019 12:19)      HISTORY OF PRESENTING ILLNESS:    HPI:  Ms. Matos is 64 yo female with no known PMH recently  with subsequent IPP admission and relocation to [place] with family after discharge. Patient with unclear psychiatric h/o, discharge dx acute psychosis, and remote h/o polysubstance abuse with no reported suicide attempts. Patient brought in by niece for a higher level of care with the family unable to provide appropriate supervision.    Patient does not know why she was brought to the hospital and denies any concerns. She denies feeling sad or having any worries. She denies any perceptual disturbances. She denies any h/o trauma. She answers most questions with "I don't know", unable to list her medications, psychiatric diagnosis/hospitalizations, reason for presentation. Patient reports feeling safe at sister's home, but states that she doesn't do anything during the day.     Patient's niece reports that the hospital recommended patient be transferred to a long-term care facility; however, with family in [place] she was relocated. Niece reports that now patient appears to be in her chronic state of impairment, with loose associations and poor insight and recall. She states that the patient has been like this for years and is why the  had to provide close supervision; however, there is no one available in the family to provide this level of care now. The family reports that they are unable to safely supervise her.    Currently patient denies any chest pain or sob, no recent fevers or chills, no cough or congestion, denies hallucinations. Patient was evaluated by psychiatry, differential include: neurocognitive impairment, tbi, complex bereavement, psychosis. (21 Nov 2019 05:13)    PAST MEDICAL & SURGICAL HISTORY  PAST MEDICAL & SURGICAL HISTORY:  Psychosis    SOCIAL HISTORY:    ALLERGIES:  No Known Allergies    MEDICATIONS:  STANDING MEDICATIONS  chlorhexidine 4% Liquid 1 Application(s) Topical <User Schedule>  folic acid 1 milliGRAM(s) Oral daily  nicotine - 21 mG/24Hr(s) Patch 1 patch Transdermal daily  QUEtiapine 50 milliGRAM(s) Oral at bedtime    PRN MEDICATIONS  QUEtiapine 25 milliGRAM(s) Oral every 8 hours PRN    VITALS:   T(F): 98  HR: 92  BP: 131/64  RR: 18  SpO2: 98%    LABS:                        RADIOLOGY:    PHYSICAL EXAM:  GEN: No acute distress  HEENT: AT, NC, PERRLA  LUNGS: Clear to auscultation bilaterally   HEART: S1/S2 present. RRR.   ABD: Soft, non-tender, non-distended. Bowel sounds present  EXT: edema, clubbing, cyanosis absent  NEURO: AAOX2  3

## 2019-12-11 NOTE — PROGRESS NOTE ADULT - ASSESSMENT
Patient is not at risk for suicide. However considering her cognitive impairment she is at risk for unintentional self harm. Hence would recommend her to be monitored closely.64 yo female with no known PMH recently  with subsequent IPP admission and relocation to [place] with family after discharge. Patient with unclear psychiatric h/o, discharge dx acute psychosis, and remote h/o polysubstance abuse with no reported suicide attempts. Patient brought in by niece for a higher level of care with the family unable to provide appropriate supervision.  Patient does not know why she was brought to the hospital and denies any concerns. She answers most questions with "I don't know", unable to list her medications, psychiatric diagnosis/hospitalizations, reason for presentation.    # Psychosis with cognitive decline with possible dementia  - H/o IPP admission for acut psychosis  - CT Head No Cont (11.20.19 @ 22:32) >No CT evidence of acute intracranial pathology.  - Vitamin B12, Serum: 719 pg/mL (11.21.19 @ 12:18)  - Treponema Pallidum Antibody Interpretation: Negative(11.21.19 @ 12:18)  - Thyroid Stimulating Hormone, Serum: 0.61 uIU/mL (11.21.19 @ 12:18)  - psych eval: Patient presentation appear consistent with neurocognitive impairment, increasing her risk for unintentional self-harm.she will likely benefit from a higher level of monitoring in the outpatient setting.  - psych F/u: Patient is not at risk for suicide. However considering her cognitive impairment she is at risk for unintentional self harm. Hence would recommend her to be monitored closely. She will benefit from being place in a long term care facility for dementia  - reconsulted psych for F/u :  Consider neurology consult  for further work up of neurocognitive disorder  - neuro eval: Suspect frontotemporal dementia  - Psych reeval for agitation: unclear at this time whether patient's presentation is due solely to a neurocognitive disorder, underlying psychiatric issue or a combination of both.  More collateral about patient's baseline and chronology of psychiatric  history a are required before making further assessment and disposition planning.  Patient has chronic elevated risk of self harm due to impulsivity and neurocognitive status, however, risk is mitigated by lack of previous suicide attempts, lack of current suicidal ideation, intent or plan, and medication compliance.  Given mitigating factors, patient at this time does not appear to be at imminent risk for self harm.   - Discontinued  fluphenazine, trazodone  - Pt started on seroquel  - Psych F/u     # Folic acid deficiency  - c/w folic acid supplement    # Hep c RNA positive  - outpt F/u with GI      # DVT prophylaxis    #Full code    #Pending: Psych F/u awaiting placement in long term care facility  # Disposition: NH

## 2019-12-12 ENCOUNTER — INPATIENT (INPATIENT)
Facility: HOSPITAL | Age: 64
LOS: 41 days | Discharge: NURSING, REHAB OR LONG-TERM CARE | End: 2020-01-23
Attending: PSYCHIATRY & NEUROLOGY | Admitting: PSYCHIATRY & NEUROLOGY
Payer: MEDICARE

## 2019-12-12 VITALS
HEART RATE: 101 BPM | DIASTOLIC BLOOD PRESSURE: 72 MMHG | OXYGEN SATURATION: 100 % | SYSTOLIC BLOOD PRESSURE: 127 MMHG | RESPIRATION RATE: 20 BRPM | TEMPERATURE: 98 F

## 2019-12-12 VITALS
WEIGHT: 125 LBS | RESPIRATION RATE: 18 BRPM | HEIGHT: 66 IN | SYSTOLIC BLOOD PRESSURE: 122 MMHG | DIASTOLIC BLOOD PRESSURE: 76 MMHG | HEART RATE: 97 BPM

## 2019-12-12 DIAGNOSIS — E11.9 TYPE 2 DIABETES MELLITUS WITHOUT COMPLICATIONS: ICD-10-CM

## 2019-12-12 DIAGNOSIS — F29 UNSPECIFIED PSYCHOSIS NOT DUE TO A SUBSTANCE OR KNOWN PHYSIOLOGICAL CONDITION: ICD-10-CM

## 2019-12-12 PROCEDURE — 99239 HOSP IP/OBS DSCHRG MGMT >30: CPT

## 2019-12-12 PROCEDURE — 99232 SBSQ HOSP IP/OBS MODERATE 35: CPT

## 2019-12-12 RX ORDER — QUETIAPINE FUMARATE 200 MG/1
1 TABLET, FILM COATED ORAL
Qty: 0 | Refills: 0 | DISCHARGE
Start: 2019-12-12

## 2019-12-12 RX ORDER — TRAZODONE HCL 50 MG
1 TABLET ORAL
Qty: 0 | Refills: 0 | DISCHARGE

## 2019-12-12 RX ORDER — FOLIC ACID 0.8 MG
1 TABLET ORAL EVERY 24 HOURS
Refills: 0 | Status: DISCONTINUED | OUTPATIENT
Start: 2019-12-12 | End: 2020-01-13

## 2019-12-12 RX ORDER — QUETIAPINE FUMARATE 200 MG/1
100 TABLET, FILM COATED ORAL EVERY 12 HOURS
Refills: 0 | Status: DISCONTINUED | OUTPATIENT
Start: 2019-12-12 | End: 2019-12-23

## 2019-12-12 RX ORDER — QUETIAPINE FUMARATE 200 MG/1
100 TABLET, FILM COATED ORAL AT BEDTIME
Refills: 0 | Status: DISCONTINUED | OUTPATIENT
Start: 2019-12-12 | End: 2019-12-12

## 2019-12-12 RX ORDER — SERTRALINE 25 MG/1
50 TABLET, FILM COATED ORAL
Refills: 0 | Status: DISCONTINUED | OUTPATIENT
Start: 2019-12-12 | End: 2019-12-12

## 2019-12-12 RX ORDER — QUETIAPINE FUMARATE 200 MG/1
25 TABLET, FILM COATED ORAL EVERY 6 HOURS
Refills: 0 | Status: DISCONTINUED | OUTPATIENT
Start: 2019-12-12 | End: 2019-12-23

## 2019-12-12 RX ORDER — NICOTINE POLACRILEX 2 MG
1 GUM BUCCAL DAILY
Refills: 0 | Status: DISCONTINUED | OUTPATIENT
Start: 2019-12-12 | End: 2020-01-23

## 2019-12-12 RX ORDER — SERTRALINE 25 MG/1
1 TABLET, FILM COATED ORAL
Qty: 0 | Refills: 0 | DISCHARGE
Start: 2019-12-12

## 2019-12-12 RX ORDER — QUETIAPINE FUMARATE 200 MG/1
50 TABLET, FILM COATED ORAL
Refills: 0 | Status: DISCONTINUED | OUTPATIENT
Start: 2019-12-12 | End: 2019-12-12

## 2019-12-12 RX ORDER — FOLIC ACID 0.8 MG
1 TABLET ORAL
Qty: 0 | Refills: 0 | DISCHARGE
Start: 2019-12-12

## 2019-12-12 RX ORDER — FLUPHENAZINE HYDROCHLORIDE 1 MG/1
1 TABLET, FILM COATED ORAL
Qty: 0 | Refills: 0 | DISCHARGE

## 2019-12-12 RX ORDER — SERTRALINE 25 MG/1
50 TABLET, FILM COATED ORAL
Refills: 0 | Status: DISCONTINUED | OUTPATIENT
Start: 2019-12-12 | End: 2019-12-20

## 2019-12-12 RX ORDER — NICOTINE POLACRILEX 2 MG
1 GUM BUCCAL
Qty: 0 | Refills: 0 | DISCHARGE
Start: 2019-12-12

## 2019-12-12 RX ADMIN — QUETIAPINE FUMARATE 100 MILLIGRAM(S): 200 TABLET, FILM COATED ORAL at 20:33

## 2019-12-12 RX ADMIN — SERTRALINE 50 MILLIGRAM(S): 25 TABLET, FILM COATED ORAL at 12:12

## 2019-12-12 RX ADMIN — QUETIAPINE FUMARATE 50 MILLIGRAM(S): 200 TABLET, FILM COATED ORAL at 11:26

## 2019-12-12 RX ADMIN — Medication 1 PATCH: at 19:15

## 2019-12-12 RX ADMIN — Medication 1 PATCH: at 11:26

## 2019-12-12 RX ADMIN — Medication 1 MILLIGRAM(S): at 11:24

## 2019-12-12 RX ADMIN — Medication 1 MILLIGRAM(S): at 18:07

## 2019-12-12 RX ADMIN — Medication 1 PATCH: at 07:48

## 2019-12-12 RX ADMIN — SERTRALINE 50 MILLIGRAM(S): 25 TABLET, FILM COATED ORAL at 18:08

## 2019-12-12 RX ADMIN — Medication 0.5 MILLIGRAM(S): at 20:33

## 2019-12-12 RX ADMIN — Medication 1 PATCH: at 05:01

## 2019-12-12 RX ADMIN — Medication 1 PATCH: at 18:07

## 2019-12-12 RX ADMIN — Medication 0.5 MILLIGRAM(S): at 11:25

## 2019-12-12 NOTE — PROGRESS NOTE ADULT - ASSESSMENT
62 yo female with no known PMH recently  with subsequent IPP admission in [place] in [place] and relocation to [place] with family after discharge. Patient with unclear psychiatric h/o, discharge dx acute psychosis, and remote h/o polysubstance abuse with no reported suicide attempts. Patient brought in by niece for a higher level of care with the family unable to provide appropriate supervision.    Patient does not know why she was brought to the hospital and denies any concerns. She answers most questions with "I don't know", unable to list her medications, psychiatric diagnosis/hospitalizations, reason for presentation.    Patient seen to be much more agitated and aggressive today and over the period of last night    #) Cognitive impairment? Acute psychosis secondary to polysubstance abuse?  -  passed away about a month ago, Grievance vs depression along with cognitive impairment   - Patient restarted on zoloft 50mg, ativan 0.5mg,  Seroquel 50 mg QHS. Will also add Seroquel 25 mg Q8 PRN for agitation.  - patient was admitted for ipp in [place] and was later discharged with the diagnosis of acute psychosis, - Niece mentioned patient is unable to take care of herself, and family is unable to care for her, need facility to care for her  - psych consulted two times now: both agreed patient does not need to be in Inpatient psychiatry,- TSH/b12/symphilis negative  - Due to cognitive impairment patient is increased risk for unintentional self-harm.  - Neurology recommends findings consistent with fronto-temporal dementia  - Denies suicidal ideation  - patient requires placement in  long term care for her dementia  - TSH - WNL    # smoker  -nicotine - 21 mG/24Hr(s) Patch 1 patch Transdermal daily    # HCV positive (negative)  - appears to have been treated in the past  - labs show high viral load for HCV, apparently treatment was not successful  -patient will need outpatient follow up with GI for treatment  - patient/family does not know      Activity: increase as tolerated  Gi ppx: no need  Dvt ppx: lovenox  Diet: mechanical soft diet  Dispo: pending placement , patient would be discharged to inpatient [facility] for IPP eval  Code: full

## 2019-12-12 NOTE — PROGRESS NOTE ADULT - REASON FOR ADMISSION
Change in mental status

## 2019-12-12 NOTE — DISCHARGE NOTE PROVIDER - NSDCMRMEDTOKEN_GEN_ALL_CORE_FT
Ativan 0.5 mg oral tablet: 1 tab(s) orally once a day (at bedtime)  Prolixin 1 mg oral tablet: 1 tab(s) orally 2 times a day  traZODone 100 mg oral tablet: 1 tab(s) orally once a day (at bedtime) folic acid 1 mg oral tablet: 1 tab(s) orally once a day  LORazepam 0.5 mg oral tablet: 1 tab(s) orally every 6 hours, As needed, Agitation  nicotine 21 mg/24 hr transdermal film, extended release: 1 patch transdermal once a day  QUEtiapine 100 mg oral tablet: 1 tab(s) orally once a day (at bedtime)  QUEtiapine 50 mg oral tablet: 1 tab(s) orally once a day in am  sertraline 50 mg oral tablet: 1 tab(s) orally

## 2019-12-12 NOTE — DISCHARGE NOTE PROVIDER - NSDCCPCAREPLAN_GEN_ALL_CORE_FT
PRINCIPAL DISCHARGE DIAGNOSIS  Diagnosis: Mental status change resolved  Assessment and Plan of Treatment: You were admitted for the altered mental status. Over your period of stay patient transitioned from somewhat catatonic state to an agitated state where she became paranoid slowly, would scream at people, ask about food, and that "am I dying" , "do I get an electric chair ?". Patient was evaluated by psychiatry and was found to have  chronically elevated risk of self harm due to impulsivity and neurocognitive status. You have been started on seroquel, zoloft, fluphenazine and ativan prn. You are now being discharged to inpatient psych for IPP eval      SECONDARY DISCHARGE DIAGNOSES  Diagnosis: Hepatitis C virus  Assessment and Plan of Treatment: Your blood tests are consistent with Hepatitis C infection. You are advised to follow up with the gasteroenterolgy outpatient for antiviral therapy. PRINCIPAL DISCHARGE DIAGNOSIS  Diagnosis: Mental status change resolved  Assessment and Plan of Treatment: You were admitted for the altered mental status. Over your period of stay patient transitioned from somewhat catatonic state to an agitated state where she became paranoid slowly, would scream at people, ask about food, and that "am I dying" , "do I get an electric chair ?". Patient was evaluated by psychiatry and was found to have  chronically elevated risk of self harm due to impulsivity and neurocognitive status. You have been started on seroquel, zoloft, fluphenazine and ativan prn. You are now being discharged to inpatient psych for IPP eval      SECONDARY DISCHARGE DIAGNOSES  Diagnosis: Folic acid deficiency  Assessment and Plan of Treatment: Your folic acid levels were found to be low. Please take your supplements as prescribed.    Diagnosis: Hepatitis C virus  Assessment and Plan of Treatment: Your blood tests are consistent with Hepatitis C infection. You are advised to follow up with the gasteroenterolgy outpatient for antiviral therapy.

## 2019-12-12 NOTE — H&P ADULT - NSHPPHYSICALEXAM_GEN_ALL_CORE
VITALS: Vital Signs Last 24 Hrs  T(C): 36.6 (12 Dec 2019 14:21), Max: 36.8 (11 Dec 2019 21:14)  T(F): 97.8 (12 Dec 2019 14:21), Max: 98.2 (11 Dec 2019 21:14)  HR: 101 (12 Dec 2019 14:21) (98 - 101)  BP: 127/72 (12 Dec 2019 14:21) (127/72 - 136/75)  BP(mean): 94 (12 Dec 2019 14:21) (94 - 94)  RR: 20 (12 Dec 2019 14:21) (20 - 20)  SpO2: 100% (12 Dec 2019 14:21) (98% - 100%)    GENERAL: Disoriented behavior, asking "am I going to die", "am I life threatening?"  HEAD:  Atraumatic, Normocephalic  EYES: conjunctiva and sclera clear  ENT: Moist mucous membranes  NECK: Supple, No JVD  CHEST/LUNG: Clear to auscultation bilaterally  HEART: Regular rate and rhythm; No murmurs, rubs, or gallops  ABDOMEN: Bowel sounds present; Soft, Nontender, Nondistended.   EXTREMITIES:  2+ Peripheral Pulses, brisk capillary refill. No clubbing, cyanosis, or edema  NERVOUS SYSTEM:  Confused   MSK: FROM all 4 extremities, full and equal strength  SKIN: +Dry skin

## 2019-12-12 NOTE — DISCHARGE NOTE NURSING/CASE MANAGEMENT/SOCIAL WORK - NSDCPEEMAIL_GEN_ALL_CORE
[facility] for Tobacco Control email [email or web address]

## 2019-12-12 NOTE — H&P ADULT - HISTORY OF PRESENT ILLNESS
63F with PMHx of diet controlled DM, polysub abuser, Hep C treated in past, psychosis was initially admitted to [facility] for medical evaluation for neurocognitive impairment on 11/21/2019 and then was cleared to be transferred to [facility] on 12/12/2019. Patient currently denies fevers, chills, nausea, vomiting, new onset of headaches, visual changes, auditory changes, sore throat, neck stiffness, chest pain, shortness of breath, palpitations, abdominal pain, flank pain, new onset of / worsening low back pain, diarrhea, constipation, dysuria, new onset of leg swelling, new onset of rashes, abnormal gait.

## 2019-12-12 NOTE — DISCHARGE NOTE NURSING/CASE MANAGEMENT/SOCIAL WORK - NSDCPEWEB_GEN_ALL_CORE
[place] website --- [email or web address] Tobacco [email or web address] website --- [email or web address]

## 2019-12-12 NOTE — DISCHARGE NOTE NURSING/CASE MANAGEMENT/SOCIAL WORK - PATIENT PORTAL LINK FT
You can access the FollowMyHealth Patient Portal offered by [facility] by registering at the following website: [email or web address]. By joining [employer]’s FollowMyHealth portal, you will also be able to view your health information using other applications (apps) compatible with our system.

## 2019-12-12 NOTE — CONSULT NOTE ADULT - ASSESSMENT
Assessment:     Patient is a 63 y o  female, previously domiciled with  in [place] then relocated to [place] to be cared for by family after 's passing, with history of polysubstance abuse (son reports past rehab for Benzodiazepine use d/o and other substances he is unaware of), psychiatric history of anxiety and chart history of Bipolar Disorder (collateral does not support this dx), recently discharged from inpatient psychiatric unit in [place] for decompensation (with d/c dx of Acute Psychosis), with apparent f/u with psychiatrist in [place] prior to admission (unable to reach Dr. Marci Berkowitz [phone] & [phone]), was brought in by family due to inability to care for patient and was admitted to medical floor for placement to a higher level of care.  Psychiatry was initially consulted to evaluate for appropriateness for inpatient psychiatric unit given erratic and agitated behavior.  At that time, patient did not appear to meet criteria for involuntary admission and assessment was that her symptoms were primarily due to neurocognitive disorder. Psychiatry was reconsulted for worsening sleep and increasing agitation.    At time of reevaluation, patient's medication regimen was changed from Prolixin 1mg PO BID and Trazodone 100mg PO qhs to Seroquel 50mg PO qhs and 25mg PO PRN q8h.  On this regimen, patient remained agitated, with disorganized thought process and behavior, anxious, somatically preoccupied, paranoid and with poor sleep.  Neurology consult reported suspicion of frontotemporal dementia w/ possible comorbid psychiatric component.     Due to above behavioral symptoms, patient does not appear to be able to care for herself at this time and meets criteria for involuntary inpatient psychiatric admission for safety and medication optimization.      Plan:   -admit to inpatient psychiatric unit under 9.27 legal status (legals, signed by bot psychiatrist and primary team attending, in chart)  -Insomnia and Disorganized Behavior: titrate Seroquel to 50mg PO qAM and 100mg PO qhs   -Anxiety: start Zoloft 50mg PO qAM and Ativan .5mg PO PRN q6h  -follow up with patient's son for past medication trial list, given history of EPS with past trials Assessment:     Patient is a 63 y o  female, previously domiciled with  in [place], then relocated to [place] to be cared for by family after 's passing, with history of polysubstance abuse (son reports past rehab for Benzodiazepine use d/o and other substances he is unaware of), psychiatric history of anxiety and chart history of Bipolar Disorder (collateral does not support this dx), recently discharged from inpatient psychiatric unit in [place] for decompensation (with d/c dx of Acute Psychosis), with apparent f/u with psychiatrist in [place] prior to admission (unable to reach Dr. Marci Berkowitz [phone] & [phone]), was brought in by family due to inability to care for patient.  She was admitted to medical floor for placement to a higher level of care.  Psychiatry was initially consulted to evaluate for appropriateness for inpatient psychiatric unit given erratic and agitated behavior.  At that time, patient did not appear to meet criteria for involuntary admission and assessment was that her symptoms were primarily due to neurocognitive disorder. Psychiatry was reconsulted for worsening sleep and increasing agitation.    At time of reevaluation, patient's medication regimen was changed from Prolixin 1mg PO BID and Trazodone 100mg PO qhs to Seroquel 50mg PO qhs and 25mg PO PRN q8h.  On this regimen, patient remained agitated, with disorganized thought process and behavior, anxious, somatically preoccupied, paranoid and with poor sleep.  Neurology consult reported suspicion of frontotemporal dementia w/ possible comorbid psychiatric component.     Due to above behavioral symptoms, patient does not appear to be able to care for herself at this time and meets criteria for involuntary inpatient psychiatric admission for safety and medication optimization.      Reccommendations:   -admit to inpatient psychiatric unit under 9.27 legal status (legals, signed by both psychiatrist and primary team attending, in chart)  -Insomnia and Disorganized Behavior: titrate Seroquel to 50mg PO qAM and 100mg PO qhs   -Anxiety: start Zoloft 50mg PO qAM and Ativan .5mg PO PRN q6h  -follow up with patient's son for past medication trial list, given history of EPS with past trials

## 2019-12-12 NOTE — PATIENT PROFILE BEHAVIORAL HEALTH - MEDICATION, ABILITY TO FOLLOW SCHEDULE, PROFILE
inability to prepare and administer dose correctly/medication side effects/lack of knowledge regarding medication purpose

## 2019-12-12 NOTE — CHART NOTE - NSCHARTNOTEFT_GEN_A_CORE
Registered Dietitian Limited Note:  -Observed pt in room eating lunch at time of assessment. Pt with very good appetite and eats % meals and often asking for seconds. Consumes several snacks throughout day as well. Pt with cognitive impairment d/t possibly acute psychosis. Pt awaiting long term placement in inpatient psych for IPP eval. Labs reviewed. Meds reviewed. No further nutrition intervention. Reassess in 7 days.

## 2019-12-12 NOTE — PROGRESS NOTE ADULT - SUBJECTIVE AND OBJECTIVE BOX
LENGTH OF HOSPITAL STAY: 21d    CHIEF COMPLAINT:   Patient is a 63y old  Female who presents with a chief complaint of Change in mental status (12 Dec 2019 10:44)      HISTORY OF PRESENTING ILLNESS:    HPI:  Ms. Matos is 64 yo female with no known PMH recently  with subsequent IPP admission and relocation to [place] with family after discharge. Patient with unclear psychiatric h/o, discharge dx acute psychosis, and remote h/o polysubstance abuse with no reported suicide attempts. Patient brought in by niece for a higher level of care with the family unable to provide appropriate supervision.    Patient does not know why she was brought to the hospital and denies any concerns. She denies feeling sad or having any worries. She denies any perceptual disturbances. She denies any h/o trauma. She answers most questions with "I don't know", unable to list her medications, psychiatric diagnosis/hospitalizations, reason for presentation. Patient reports feeling safe at sister's home, but states that she doesn't do anything during the day.     Patient's niece reports that the hospital recommended patient be transferred to a long-term care facility; however, with family in [place] she was relocated. Niece reports that now patient appears to be in her chronic state of impairment, with loose associations and poor insight and recall. She states that the patient has been like this for years and is why the  had to provide close supervision; however, there is no one available in the family to provide this level of care now. The family reports that they are unable to safely supervise her.    Currently patient denies any chest pain or sob, no recent fevers or chills, no cough or congestion, denies hallucinations. Patient was evaluated by psychiatry, differential include: neurocognitive impairment, tbi, complex bereavement, psychosis. (21 Nov 2019 05:13)    PAST MEDICAL & SURGICAL HISTORY  PAST MEDICAL & SURGICAL HISTORY:  Psychosis    SOCIAL HISTORY:    ALLERGIES:  No Known Allergies    MEDICATIONS:  STANDING MEDICATIONS  chlorhexidine 4% Liquid 1 Application(s) Topical <User Schedule>  folic acid 1 milliGRAM(s) Oral daily  nicotine - 21 mG/24Hr(s) Patch 1 patch Transdermal daily  QUEtiapine 100 milliGRAM(s) Oral at bedtime  QUEtiapine 50 milliGRAM(s) Oral <User Schedule>  sertraline 50 milliGRAM(s) Oral <User Schedule>    PRN MEDICATIONS  LORazepam     Tablet 0.5 milliGRAM(s) Oral every 6 hours PRN    VITALS:   T(F): 97.9  HR: 101  BP: 136/75  RR: 20  SpO2: 99%    LABS:                        RADIOLOGY:    PHYSICAL EXAM:  GEN: No acute distress  HEENT: AT, NC, PERRLA  LUNGS: Clear to auscultation bilaterally   HEART: S1/S2 present. RRR.   ABD: Soft, non-tender, non-distended. Bowel sounds present  EXT: edema, clubbing, cyanosis absent  NEURO: AAOX2

## 2019-12-12 NOTE — H&P ADULT - ASSESSMENT
63F with PMHx of diet controlled DM, polysub abuser, Hep C treated in past, psychosis was initially admitted to [facility] for medical evaluation for neurocognitive impairment on 11/21/2019 and then was cleared to be transferred to IPP on 12/12/2019. Currently denies any medical complaints.     Plan:   - Admit to IPP   - Plan per psych   - Medicine c/s PRN   - Suggests Carbconsistent diet

## 2019-12-12 NOTE — DISCHARGE NOTE PROVIDER - HOSPITAL COURSE
Ms. Matos is 64 yo female with no known PMH recently  with subsequent IPP admission and relocation to [place] with family after discharge. Patient with unclear psychiatric h/o, discharge dx acute psychosis, and remote h/o polysubstance abuse with no reported suicide attempts. Patient brought in by niece for a higher level of care with the family unable to provide appropriate supervision.        Patient does not know why she was brought to the hospital and denies any concerns. She denies feeling sad or having any worries. She denies any perceptual disturbances. She denies any h/o trauma. She answers most questions with "I don't know", unable to list her medications, psychiatric diagnosis/hospitalizations, reason for presentation. Patient reports feeling safe at sister's home, but states that she doesn't do anything during the day.         Patient's niece reports that the hospital recommended patient be transferred to a long-term care facility; however, with family in [place] she was relocated. Niece reports that now patient appears to be in her chronic state of impairment, with loose associations and poor insight and recall. She states that the patient has been like this for years and is why the  had to provide close supervision; however, there is no one available in the family to provide this level of care now. The family reports that they are unable to safely supervise her.        Over her period of stay patient transitioned from somewhat catatonic state to an agitated state where she became paranoid slowly, would scream at people, ask about food, and that "am I dying" , "do I get an electric chair ?". Patient was evaluated by psychiatry and was found to have  chronically elevated risk of self harm due to impulsivity and neurocognitive status.                # Psychosis with cognitive decline with possible dementia    - H/o IPP admission for acut psychosis    - CT Head No Cont (11.20.19 @ 22:32) >No CT evidence of acute intracranial pathology.    - Vitamin B12, Serum: 719 pg/mL (11.21.19 @ 12:18)    - Treponema Pallidum Antibody Interpretation: Negative(11.21.19 @ 12:18)    - Thyroid Stimulating Hormone, Serum: 0.61 uIU/mL (11.21.19 @ 12:18)    - psych eval: Patient presentation appear consistent with neurocognitive impairment, increasing her risk for unintentional self-harm.she will likely benefit from a higher level of monitoring in the outpatient setting.    - psych F/u: Patient is not at risk for suicide. However considering her cognitive impairment she is at risk for unintentional self harm. Hence would recommend her to be monitored closely. She will benefit from being place in a long term care facility for dementia    - reconsulted psych for F/u :  Consider neurology consult  for further work up of neurocognitive disorder    - neuro eval: Suspect frontotemporal dementia    - Psych reeval for agitation: unclear at this time whether patient's presentation is due solely to a neurocognitive disorder, underlying psychiatric issue or a combination of both.  More collateral about patient's baseline and chronology of psychiatric  history a are required before making further assessment and disposition planning.  Patient has chronic elevated risk of self harm due to impulsivity and neurocognitive status, however, risk is mitigated by lack of previous suicide attempts, lack of current suicidal ideation, intent or plan, and medication compliance.  Given mitigating factors, patient at this time does not appear to be at imminent risk for self harm.     - c/w fluphenazine, trazodone, seroquel    - Pt started on seroquel            # Folic acid deficiency    - c/w folic acid supplement        # Hep c RNA positive    - outpt F/u with GI for treatment            Patient now being discharged for IPP eval in inpatient psych eval. Ms. Matos is 64 yo female with no known PMH recently  with subsequent IPP admission and relocation to [place] with family after discharge. Patient with unclear psychiatric h/o, discharge dx acute psychosis, and remote h/o polysubstance abuse with no reported suicide attempts. Patient brought in by niece for a higher level of care with the family unable to provide appropriate supervision.        Patient does not know why she was brought to the hospital and denies any concerns. She denies feeling sad or having any worries. She denies any perceptual disturbances. She denies any h/o trauma. She answers most questions with "I don't know", unable to list her medications, psychiatric diagnosis/hospitalizations, reason for presentation. Patient reports feeling safe at sister's home, but states that she doesn't do anything during the day.         Patient's niece reports that the hospital recommended patient be transferred to a long-term care facility; however, with family in [place] she was relocated. Niece reports that now patient appears to be in her chronic state of impairment, with loose associations and poor insight and recall. She states that the patient has been like this for years and is why the  had to provide close supervision; however, there is no one available in the family to provide this level of care now. The family reports that they are unable to safely supervise her.        Over her period of stay patient transitioned from somewhat catatonic state to an agitated state where she became paranoid slowly, would scream at people, ask about food, and that "am I dying" , "do I get an electric chair ?". Patient was evaluated by psychiatry and was found to have  chronically elevated risk of self harm due to impulsivity and neurocognitive status.                # Psychosis with cognitive decline with possible dementia    - H/o IPP admission for acut psychosis    - CT Head No Cont (11.20.19 @ 22:32) >No CT evidence of acute intracranial pathology.    - Vitamin B12, Serum: 719 pg/mL (11.21.19 @ 12:18)    - Treponema Pallidum Antibody Interpretation: Negative(11.21.19 @ 12:18)    - Thyroid Stimulating Hormone, Serum: 0.61 uIU/mL (11.21.19 @ 12:18)    - psych eval: Patient presentation appear consistent with neurocognitive impairment, increasing her risk for unintentional self-harm.she will likely benefit from a higher level of monitoring in the outpatient setting.    - psych F/u: Patient is not at risk for suicide. However considering her cognitive impairment she is at risk for unintentional self harm. Hence would recommend her to be monitored closely. She will benefit from being place in a long term care facility for dementia    - reconsulted psych for F/u :  Consider neurology consult  for further work up of neurocognitive disorder    - neuro eval: Suspect frontotemporal dementia    - Psych reeval for agitation: unclear at this time whether patient's presentation is due solely to a neurocognitive disorder, underlying psychiatric issue or a combination of both.  More collateral about patient's baseline and chronology of psychiatric  history a are required before making further assessment and disposition planning.  Patient has chronic elevated risk of self harm due to impulsivity and neurocognitive status, however, risk is mitigated by lack of previous suicide attempts, lack of current suicidal ideation, intent or plan, and medication compliance.  Given mitigating factors, patient at this time does not appear to be at imminent risk for self harm.     - c/w fluphenazine, trazodone, seroquel    - c/w seroquel    - Psych F/u : admit to inpatient psychiatric unit under 9.27 legal status            # Folic acid deficiency    - c/w folic acid supplement        # Hep c RNA positive    - outpt F/u with GI for treatment            Patient now being discharged for IPP eval in inpatient psych eval.

## 2019-12-12 NOTE — CONSULT NOTE ADULT - SUBJECTIVE AND OBJECTIVE BOX
Subjective:     CC: "Am I dying?"     HPI: As per primary team, since last evaluation, patient remains intrusive to staff and the unit milieu, somatically preoccupied, agitated and with poor sleep.      On evaluation, patient is seen pacing the unit, asking staff "Am I going to die?" "Am I going to be electrocuted?"  She is unable to state the date correctly or reason for hospitalization and does not recognize the undersigned, who she met 2 days ago.  Meaningful interview is limited due to patient's disorganized behavior, impaired reasoning, perseveration and tangentiality.    Collateral from patient's son,     Objective:     Vitals     Vital Signs Last 24 Hrs  T(C): 36.7 (12 Dec 2019 02:30), Max: 36.7 (12 Dec 2019 02:30)  T(F): 98 (12 Dec 2019 02:30), Max: 98 (12 Dec 2019 02:30)  HR: 89 (12 Dec 2019 11:45) (80 - 112)  BP: 130/75 (12 Dec 2019 11:45) (115/59 - 144/63)  BP(mean): --  RR: 18 (12 Dec 2019 11:00) (16 - 18)  SpO2: 98% (12 Dec 2019 11:00) (98% - 100%)    Labs:                           14.3   9.35  )-----------( 311      ( 12 Dec 2019 03:00 )             43.8     12-12    139  |  103  |  16  ----------------------------<  90  4.4   |  21  |  0.8    Ca    9.0      12 Dec 2019 03:00    TPro  7.2  /  Alb  4.7  /  TBili  0.2  /  DBili  x   /  AST  40  /  ALT  34  /  AlkPhos  320<H>  12-12    EKG:   < from: 12 Lead ECG (12.12.19 @ 04:42) >  QTC Calculation(Bezet) 435 ms    < end of copied text >    Neurological Exam as per Neurology team documentation :     "some difficulty with naming.  Nondysarthric.  Cannot subtract 3's, cannot spell "world" forward or backward--misses a letter. Clock drawn is abnormal, disorganized. Comments are inappropriate for setting. Low frustration tolerance and impaired ability to follow instructions. Some apraxia evident."    Mental Status Exam: Subjective:     CC: "Am I dying?"     HPI: As per primary team, since last evaluation, patient remains intrusive to staff and the unit milieu, somatically preoccupied, agitated and with poor sleep.      On evaluation, patient is seen pacing the unit, asking staff "Am I going to die?" "Am I going to be electrocuted?"  She is unable to state the date correctly or reason for hospitalization and does not recognize the undersigned, whom she met 2 days ago.  Meaningful interview is limited due to patient's disorganized behavior, impaired reasoning, perseveration and tangentiality.    Collateral from patient's son states that patient began to "have mental decline 10-12 years ago," which started slowly, however, worsened gradually over the years.  Describes the decline as patient "thinking that people are calling her with a recording telling her she was going to die...thinking people are stealing from her...repeatedly saying 'I'm not electric' and 'I'm not gas' at increasing frequency over the years...occasionally wandering out of the house and walking to the supermarket and getting lost."  Reports that patient had no history of psychosis prior to 10-12 years ago and when he was younger she would "sometimes think people were talking about her but nothing extreme."  Does report severe anxiety over the years, for which she was rxed Klonopin; he can't recall the dose or frequency or how long she was on this regimen.  He reports that prior to hospitalization in [place], patent was followed by a psychiatrist who started her on medications, whose names he can't recall, however, "they gave her twitches so they stopped them."  He reports he has a list at home that he will retrieve and provide to team.  Reports that when he last saw her this Sunday, "she is better than she has been in years."  Reports no past follow up with Neurology.      Objective:     Vitals     Vital Signs Last 24 Hrs  T(C): 36.7 (12 Dec 2019 02:30), Max: 36.7 (12 Dec 2019 02:30)  T(F): 98 (12 Dec 2019 02:30), Max: 98 (12 Dec 2019 02:30)  HR: 89 (12 Dec 2019 11:45) (80 - 112)  BP: 130/75 (12 Dec 2019 11:45) (115/59 - 144/63)  BP(mean): --  RR: 18 (12 Dec 2019 11:00) (16 - 18)  SpO2: 98% (12 Dec 2019 11:00) (98% - 100%)    Labs:                           14.3   9.35  )-----------( 311      ( 12 Dec 2019 03:00 )             43.8     12-12    139  |  103  |  16  ----------------------------<  90  4.4   |  21  |  0.8    Ca    9.0      12 Dec 2019 03:00    TPro  7.2  /  Alb  4.7  /  TBili  0.2  /  DBili  x   /  AST  40  /  ALT  34  /  AlkPhos  320<H>  12-12    EKG:   < from: 12 Lead ECG (12.12.19 @ 04:42) >  QTC Calculation(Bezet) 435 ms    < end of copied text >    Neurological Exam as per Neurology team documentation :     "some difficulty with naming.  Nondysarthric.  Cannot subtract 3's, cannot spell "world" forward or backward--misses a letter. Clock drawn is abnormal, disorganized. Comments are inappropriate for setting. Low frustration tolerance and impaired ability to follow instructions. Some apraxia evident."    Mental Status Exam:     MENTAL STATUS EXAM:     · Level of Consciousness	Alert	  · General Appearance	Well developed	  · Body Habitus	Well nourished	  · Hygiene	Good	  · Grooming	Good	  · Behavior	Cooperative, Intrusive 	  · Eye Contact	Good	  · Relatedness	Poor	  · Impulse Control	Impaired	  · Abnormal Movements	No abnormal movements	  · Gait / Station	Normal gait / station	  · Speech Volume	Normal	  · Speech Rate	Normal	  · Speech Spontaneity	Increased latency	  · Speech Articulation	Normal	  · Reported mood	Normal	  · Observed mood	Anxious	  · Affect Range	Constricted	  · Affect Congruence	Not congruent	  · Thought Process	Perseverative/ Impaired reasoning	  · Thought Associations	Loose	  · Thought Content	Preoccupations	  · Perceptions	No abnormalities	  · Oriented to Time	No	  · Oriented to Place	Yes	  · Oriented to Situation	No	  · Oriented to Person	No	  · Attention / Concentration	Impaired, however, improved since last assessment 	  · Estimated Intelligence	Other	  · Other	unable to assess	  · Recent Memory	Impaired	  · Remote Memory	Impaired	  · Fund of Knowledge	Impaired	  · Language	No abnormalities noted	  · Judgment (regarding everyday events)	Poor	  · Insight (regarding psychiatric illness)	Poor

## 2019-12-12 NOTE — DISCHARGE NOTE PROVIDER - CARE PROVIDER_API CALL
Jae Carrera)  Gastroenterology; Internal Medicine  [address] [place] [address]  Phone: [phone]  Fax: [phone]  Follow Up Time: 1 week

## 2019-12-13 DIAGNOSIS — E11.9 TYPE 2 DIABETES MELLITUS WITHOUT COMPLICATIONS: ICD-10-CM

## 2019-12-13 DIAGNOSIS — F41.9 ANXIETY DISORDER, UNSPECIFIED: ICD-10-CM

## 2019-12-13 DIAGNOSIS — R41.9 UNSPECIFIED SYMPTOMS AND SIGNS INVOLVING COGNITIVE FUNCTIONS AND AWARENESS: ICD-10-CM

## 2019-12-13 DIAGNOSIS — F29 UNSPECIFIED PSYCHOSIS NOT DUE TO A SUBSTANCE OR KNOWN PHYSIOLOGICAL CONDITION: ICD-10-CM

## 2019-12-13 LAB
ALBUMIN SERPL ELPH-MCNC: 4.5 G/DL — SIGNIFICANT CHANGE UP (ref 3.5–5.2)
ALP SERPL-CCNC: 88 U/L — SIGNIFICANT CHANGE UP (ref 30–115)
ALT FLD-CCNC: 109 U/L — HIGH (ref 0–41)
ANION GAP SERPL CALC-SCNC: 11 MMOL/L — SIGNIFICANT CHANGE UP (ref 7–14)
AST SERPL-CCNC: 68 U/L — HIGH (ref 0–41)
BILIRUB SERPL-MCNC: 0.7 MG/DL — SIGNIFICANT CHANGE UP (ref 0.2–1.2)
BUN SERPL-MCNC: 10 MG/DL — SIGNIFICANT CHANGE UP (ref 10–20)
CALCIUM SERPL-MCNC: 9.7 MG/DL — SIGNIFICANT CHANGE UP (ref 8.5–10.1)
CHLORIDE SERPL-SCNC: 96 MMOL/L — LOW (ref 98–110)
CO2 SERPL-SCNC: 29 MMOL/L — SIGNIFICANT CHANGE UP (ref 17–32)
CREAT SERPL-MCNC: 0.7 MG/DL — SIGNIFICANT CHANGE UP (ref 0.7–1.5)
GLUCOSE SERPL-MCNC: 116 MG/DL — HIGH (ref 70–99)
POTASSIUM SERPL-MCNC: 4.1 MMOL/L — SIGNIFICANT CHANGE UP (ref 3.5–5)
POTASSIUM SERPL-SCNC: 4.1 MMOL/L — SIGNIFICANT CHANGE UP (ref 3.5–5)
PROT SERPL-MCNC: 8.5 G/DL — HIGH (ref 6–8)
SODIUM SERPL-SCNC: 136 MMOL/L — SIGNIFICANT CHANGE UP (ref 135–146)

## 2019-12-13 PROCEDURE — 99231 SBSQ HOSP IP/OBS SF/LOW 25: CPT

## 2019-12-13 RX ADMIN — QUETIAPINE FUMARATE 100 MILLIGRAM(S): 200 TABLET, FILM COATED ORAL at 20:48

## 2019-12-13 RX ADMIN — Medication 0.5 MILLIGRAM(S): at 20:52

## 2019-12-13 RX ADMIN — Medication 1 MILLIGRAM(S): at 17:14

## 2019-12-13 RX ADMIN — QUETIAPINE FUMARATE 100 MILLIGRAM(S): 200 TABLET, FILM COATED ORAL at 08:54

## 2019-12-13 RX ADMIN — SERTRALINE 50 MILLIGRAM(S): 25 TABLET, FILM COATED ORAL at 08:54

## 2019-12-13 RX ADMIN — Medication 1 PATCH: at 08:54

## 2019-12-13 RX ADMIN — Medication 0.5 MILLIGRAM(S): at 08:54

## 2019-12-13 RX ADMIN — Medication 0.5 MILLIGRAM(S): at 19:45

## 2019-12-13 RX ADMIN — Medication 0.5 MILLIGRAM(S): at 13:05

## 2019-12-13 NOTE — PROGRESS NOTE BEHAVIORAL HEALTH - NSBHFUPADDHPIFT_PSY_A_CORE
Patient reports she was admitted to hospital "for a change of scenery". Reports she had one panic attack 15 years ago, was placed on klonopin 10 years ago for anxiety, unsure of dosage.  Attempts to obtain collateral from patient's son, Torey Matos ([phone]), who lives in [place], and previous psychiatrist in [place], Dr. Marci Berkowitz [phone]) unsuccessful.

## 2019-12-13 NOTE — CHART NOTE - NSCHARTNOTEFT_GEN_A_CORE
Pt. is a 63 year old, ,  female who was brought to the ED by her family with symptoms of psychosis.  Pt. was transferred to the unit from [facility] where she had remained for 3 weeks.  Pt.'s family brought her back to [place] from [place] as pt. is no longer able to care for herself.  Pt.'s family states they will not be allowing her to return home to live with them as they are unable to take care of her.  Pt.'s psychiatric history is unclear at this time.   As per family collateral, pt. has been presenting in this state for many years.  Pt. reportedly transitions from a catatonic state to a state of aggression and will randomly scream at people and verbalize delusional beliefs.  She is a poor historian and answers, "I don't know" to most questions.  Pt. is difficult to engage in an interview.  She does have a history of polysubstance abuse and was treated for Hep. C in the past.  There is no known history of suicidal or homicidal ideation.    Pt was residing in a private home in [place].  Her family brought her back to [place], however, they are not agreeing to allow her to return home to live with them and are requesting long-term placement.  This presents as a discharge barrier.      Sexual History- Pt. identifies as heterosexual    Family History-  Pt. is .  She has 2 children, a son and daughter who are involved in her care.    Leisure Activity Assessment-  Unable to obtain.    Community Supports-  2 adult children and a niece.    Significant Losses-      History of suicide or self-injurious behavior-  None known    Employement-  Disabled    Life Goals-  Unable to obtain.    At this time, discharge placement options will need to be explored.  Pt is not stable for discharge at this time.

## 2019-12-13 NOTE — PROGRESS NOTE BEHAVIORAL HEALTH - NSBHFUPINTERVALHXFT_PSY_A_CORE
Patient seen and examined. She reports she is here for a "change of scenery" and is not sure why she is in the hospital. She is A&Ox1 (person only, but was trying to use clues from her environment to help her figure out the answers, eg. looking at writer's ID badge for name of hospital). She reports she feels "groggy" on her medication and that some of the medication "makes [her] heart beat fast". She reports her dentures are a bit looser now than before. She denies any suicidal or homicidal ideation. She denies any auditory hallucinations. She reports that there was one previous instance where she thought she saw a person in the room but that there did not turn out to be anyone there. She denies any other instances of this or similar events occurring.

## 2019-12-13 NOTE — CHART NOTE - NSCHARTNOTEFT_GEN_A_CORE
Pt. is a 63 year old, ,  female.  Pt. was brought by her family to the ED with symptoms of psychosis.  She was admitted to [facility] for 3 weeks and was transferred to this unit.  Pt. has an unclear psychiatric history, however, her family reports she has presented this way for many years.  Pt.'s  was her primary caregiver.  Her family brought her back to [place] and now state they are unable to care for her and she is unable to live alone or remain in the home unsupervised.  Pt.  has a history of polysubstance abuse but her last use is unknown as well as her psychiatric and substance abuse treatment history.  There is no known history of suicide attempts.  Pt. was attending outpatient treatment in [place], however, hospital staff has been unsuccessful in reaching the provider.    Due to pt.'s family unwillingness to allow her to return to their home, this present as a discharge barrier.  Staff from [facility] attempted to refer pt. to SNF's, however, she does not appear to have been accepted to a facility.  Writer will attempt to contact these staff members for additional information.      Sexual history-  Pt. identifies as heterosexual    Family History-  Pt. has an adult son and daughter.  Her  passed away.    Community Supports-  Adult children and niece    Significant Losses-      Employement-  Pt is disabled    Leisure Activity Assessment-  Unable to obtain    Suicide or self-injurious behaviors-  There is no known history    Life Goals-  Unable to Obtain.    Writer will continue to meet with pt. 1:1 and with the treatment team.  The treatment team will explore various discharge placement options.  Pt. is not stable for discharge at this time.

## 2019-12-13 NOTE — PROGRESS NOTE BEHAVIORAL HEALTH - NSBHFUPIPCHARTREVFT_PSY_A_CORE
64yo female, recently  with subsequent IPP admission and relocation to [place] with family after discharge. Patient with unclear psychiatric h/o, discharge dx acute psychosis, and remote h/o polysubstance abuse with no reported suicide attempts. Patient brought in by niece for a higher level of care with the family unable to provide appropriate supervision.  Patient does not know why she was brought to the hospital and denies any concerns. She denies feeling sad or having any worries. She denies any perceptual disturbances. She denies any h/o trauma.   She answers most questions with "I don't know", unable to list her medications, psychiatric diagnosis/hospitalizations, reason for presentation. Patient reports feeling safe at sister's home, but states that she doesn't do anything during the day.     Collateral from patient's niece: she states that after patient's   suddenly, the patient told her son that he was sleeping and wouldn't wake up. Once he was pronounced dead, the patient ran into street wailing "who's going to take care of me" with some mention of ending her life. She was subsequently hospitalized and discharged with the above diagnosis. Patient reports that the hospital recommended patient be transferred to a long-term care facility; however, with family in [place] she was relocated. Niece reports that now patient appears to be in her chronic state of impairment, with loose associations and poor insight and recall. She states that the patient has been like this for years and is why the  had to provide close supervision; however, there is no one available in the family to provide this level of care now. The family reports that they are unable to safely supervise her.  Patient was seen and interviewed by treatment team. Patient is calm and cooperative. She reports that she is "alright" and denies any feelings of depression or anxiety. Patient denies any SI/HI/AVH as well as any history of trauma. When discussing about the loss of her  she reports that he passed away one month ago but had no affect despite saying it was a sad experience.     	Collateral (Omar Hawk-niece): Last Tuesday, patient's  passed away from a heart attack and patient was distraught and ran out of the house screaming that her  was dead. EMS was called and patient was brought to [facility] in [place]. Patient was admitted for 7 days and received a diagnoses of acute psychosis and doctors recommended she be placed in a long-term care facility. She states that in the 70s and 80s patient was heavily involved in drugs such as cocaine, heroine, percocet, valium, etc. In  after patient's mother passed away, she states that patient started seeing a psychiatrist who prescribed her xanax and klonopin. Since then, patient has been seeing a different psychiatrist every few months and getting prescribed different medications. She states that patient has a history of psychiatric hospitalizations, but she is unsure of the diagnoses patient was given. She reports that patient at baseline is unable to recall her name, place or time. For the past few years, patient has not been able to care for herself and her late  took care of her including her ADLs feeding her. She states that family is planning on placing patient in long term care facility as they believe she is unable to care for herself.   Patient's psychiatric history is unclear but as per family she was heavily involved in drugs such as cocaine, heroine, percocet, valium, etc. In  after patient's mother passed away, she states that patient started seeing a psychiatrist who prescribed her xanax and klonopin. Since then, patient has been seeing a different psychiatrist every few months and getting prescribed different medications. She had an IPP immediately after her  passed away in [place] where she was diagnosed with psychosis NOS. As per family the patient is very limited at baseline and needed help from her  in terms of daily ADLs. As per family at baseline the patient is not oriented to situations or time.         Patient has been evaluated bedside. She is pleasant and denies any concerns. She denies feeling depressed. She denies any anxiety. She denies any AH,VH, paranoia. She is alert and oriented only to person. She is able to tell her birthday but is not oriented to date, time or situation. When asked why she is in the hospital she says " Am I dying".

## 2019-12-13 NOTE — CONSULT NOTE ADULT - SUBJECTIVE AND OBJECTIVE BOX
BHAVESH LOWERY  63y  Female      Patient is a 63y old  Female who presents with a chief complaint of psychosis (12 Dec 2019 16:54)    HPI:  63F with PMHx of diet controlled DM, polysub abuser, Hep C treated in past, psychosis was initially admitted to [facility] for medical evaluation for neurocognitive impairment on 11/21/2019 and then was cleared to be transferred to [facility] on 12/12/2019. Patient currently denies fevers, chills, nausea, vomiting, new onset of headaches, visual changes, auditory changes, sore throat, neck stiffness, chest pain, shortness of breath, palpitations, abdominal pain, flank pain, new onset of / worsening low back pain, diarrhea, constipation, dysuria, new onset of leg swelling, new onset of rashes, abnormal gait. (12 Dec 2019 16:54)    INTERVAL HPI/OVERNIGHT EVENTS:  HEALTH ISSUES - PROBLEM Dx:  Diabetes mellitus: Diabetes mellitus  Psychosis: Psychosis    stabel in [place] anxious    PAST MEDICAL & SURGICAL HISTORY:  Diabetes mellitus: -   - Diet controlled  Psychosis  No significant past surgical history    FAMILY HISTORY:  No pertinent family history in first degree relatives    folic acid 1 milliGRAM(s) Oral every 24 hours  LORazepam     Tablet 0.5 milliGRAM(s) Oral every 6 hours PRN  LORazepam     Tablet 0.5 milliGRAM(s) Oral every 12 hours  nicotine -  14 mG/24Hr(s) Patch 1 patch Transdermal daily  QUEtiapine 100 milliGRAM(s) Oral every 12 hours  QUEtiapine 25 milliGRAM(s) Oral every 6 hours PRN  sertraline 50 milliGRAM(s) Oral <User Schedule>      REVIEW OF SYSTEMS:  CONSTITUTIONAL: No fever, weight loss, or fatigue  EYES: No eye pain, visual disturbances, or discharge  ENMT:  No difficulty hearing, tinnitus, vertigo; No sinus or throat pain  NECK: No pain or stiffness  BREASTS: No pain, masses, or nipple discharge  RESPIRATORY: No cough, wheezing, chills or hemoptysis; No shortness of breath  CARDIOVASCULAR: No chest pain, palpitations, dizziness, or leg swelling  GASTROINTESTINAL: No abdominal or epigastric pain. No nausea, vomiting, or hematemesis; No diarrhea or constipation. No melena or hematochezia.  GENITOURINARY: No dysuria, frequency, hematuria, or incontinence  NEUROLOGICAL: No headaches, memory loss, loss of strength, numbness, or tremors  SKIN: No itching, burning, rashes, or lesions   LYMPH NODES: No enlarged glands  ENDOCRINE: No heat or cold intolerance; No hair loss  MUSCULOSKELETAL: No joint pain or swelling; No muscle, back, or extremity pain  PSYCHIATRIC: as per hpi and previous psych history  HEME/LYMPH: No easy bruising, or bleeding gums  ALLERY AND IMMUNOLOGIC: No hives or eczema    T(C): 35.8 (12-13-19 @ 06:09), Max: 36.6 (12-12-19 @ 14:21)  HR: 86 (12-13-19 @ 06:09) (86 - 101)  BP: 94/51 (12-13-19 @ 06:09) (94/51 - 127/72)  RR: 18 (12-13-19 @ 06:09) (18 - 20)  SpO2: 100% (12-12-19 @ 14:21) (100% - 100%)  Wt(kg): --Vital Signs Last 24 Hrs  T(C): 35.8 (13 Dec 2019 06:09), Max: 36.6 (12 Dec 2019 14:21)  T(F): 96.5 (13 Dec 2019 06:09), Max: 97.8 (12 Dec 2019 14:21)  HR: 86 (13 Dec 2019 06:09) (86 - 101)  BP: 94/51 (13 Dec 2019 06:09) (94/51 - 127/72)  BP(mean): 94 (12 Dec 2019 14:21) (94 - 94)  RR: 18 (13 Dec 2019 06:09) (18 - 20)  SpO2: 100% (12 Dec 2019 14:21) (100% - 100%)    PHYSICAL EXAM:  GENERAL: NAD,well-developed  HEAD:  Atraumatic, Normocephalic  EYES: EOMI, PERRLA, conjunctiva and sclera clear  ENMT: No tonsillar erythema, exudates, or enlargement; Moist mucous membranes, Good dentition, No lesions  NECK: Supple, No JVD, Normal thyroid  CHEST/LUNG: Clear bs bilaterally; No rales, rhonchi, wheezing  HEART: Regular rate and rhythm; No murmurs, rubs, or gallops  ABDOMEN: Soft, Nontender, Nondistended; Bowel sounds present  EXTREMITIES:  2+ Peripheral Pulses, No clubbing, cyanosis, or edema  LYMPH: No lymphadenopathy noted  SKIN: No rashes or lesions  Neuro: alert  no focal deficits    Consultant(s) Notes Reviewed:  [x ] YES  [ ] NO  Care Discussed with Consultants/Other Providers [ x] YES  [ ] NO    LABS:              CAPILLARY BLOOD GLUCOSE                RADIOLOGY & ADDITIONAL TESTS:    Imaging Personally Reviewed:  [ ] YES  [ ] NO

## 2019-12-13 NOTE — PROGRESS NOTE BEHAVIORAL HEALTH - SUMMARY
Patient is a 63 y o  female, previously domiciled with  in [place], then relocated to [place] to be cared for by family after 's passing, with history of polysubstance abuse (son reports past rehab for Benzodiazepine use d/o and other substances he is unaware of), psychiatric history of anxiety and chart history of Bipolar Disorder (collateral does not support this dx), recently discharged from inpatient psychiatric unit in [place] for decompensation (with d/c dx of Acute Psychosis), with apparent f/u with psychiatrist in [place] prior to admission (unable to reach Dr. Marci Berkowitz [phone] & [phone]), was brought in by family due to inability to care for patient.  She was admitted to medical floor for placement to a higher level of care.  Psychiatry was initially consulted to evaluate for appropriateness for inpatient psychiatric unit given erratic and agitated behavior.  At that time, patient did not appear to meet criteria for involuntary admission and assessment was that her symptoms were primarily due to neurocognitive disorder. Psychiatry was reconsulted for worsening sleep and increasing agitation. At time of reevaluation, patient's medication regimen was changed from Prolixin 1mg PO BID and Trazodone 100mg PO qhs to Seroquel 50mg PO qhs and 25mg PO PRN q8h. On this regimen, patient remained agitated, with disorganized thought process and behavior, anxious, somatically preoccupied, paranoid and with poor sleep.  Neurology consult reported suspicion of frontotemporal dementia w/ possible comorbid psychiatric component. Patient did not appear to be able to care for herself and met criteria for involuntary inpatient psychiatric admission for safety and medication optimization.   On admission to unit, patient remains anxious and confused. She is likely to benefit from medication optimization and investigation of her likely neurocognitive disorder.    #Neurocognitive disorder, suspected frontotemporal dementia with possible comorbid psychiatric features   -f/u MRI brain w/ and w/o contrast  -f/u with patient's son for past medication trial list, given history of EPS with past trials    #Anxiety  -continue Seroquel 100mg PO bid   -continue Zoloft 50mg PO qAM  -continue Ativan 0.5mg PO bid, with 0.5mg PRN q6h for anxiety

## 2019-12-14 LAB
CHOLEST SERPL-MCNC: 139 MG/DL — SIGNIFICANT CHANGE UP (ref 100–200)
ESTIMATED AVERAGE GLUCOSE: 111 MG/DL — SIGNIFICANT CHANGE UP (ref 68–114)
HBA1C BLD-MCNC: 5.5 % — SIGNIFICANT CHANGE UP (ref 4–5.6)
HDLC SERPL-MCNC: 32 MG/DL — LOW
LIPID PNL WITH DIRECT LDL SERPL: 89 MG/DL — SIGNIFICANT CHANGE UP (ref 4–129)
TOTAL CHOLESTEROL/HDL RATIO MEASUREMENT: 4.3 RATIO — SIGNIFICANT CHANGE UP (ref 4–5.5)
TRIGL SERPL-MCNC: 104 MG/DL — SIGNIFICANT CHANGE UP (ref 10–149)

## 2019-12-14 PROCEDURE — 99232 SBSQ HOSP IP/OBS MODERATE 35: CPT

## 2019-12-14 PROCEDURE — 70551 MRI BRAIN STEM W/O DYE: CPT | Mod: 26

## 2019-12-14 RX ADMIN — Medication 1 PATCH: at 15:01

## 2019-12-14 RX ADMIN — Medication 0.5 MILLIGRAM(S): at 06:02

## 2019-12-14 RX ADMIN — Medication 1 PATCH: at 19:00

## 2019-12-14 RX ADMIN — SERTRALINE 50 MILLIGRAM(S): 25 TABLET, FILM COATED ORAL at 08:22

## 2019-12-14 RX ADMIN — QUETIAPINE FUMARATE 100 MILLIGRAM(S): 200 TABLET, FILM COATED ORAL at 20:44

## 2019-12-14 RX ADMIN — Medication 0.5 MILLIGRAM(S): at 20:48

## 2019-12-14 RX ADMIN — QUETIAPINE FUMARATE 100 MILLIGRAM(S): 200 TABLET, FILM COATED ORAL at 08:22

## 2019-12-14 RX ADMIN — Medication 0.5 MILLIGRAM(S): at 08:23

## 2019-12-14 RX ADMIN — Medication 0.5 MILLIGRAM(S): at 17:03

## 2019-12-14 RX ADMIN — Medication 1 MILLIGRAM(S): at 20:44

## 2019-12-14 NOTE — PROGRESS NOTE ADULT - SUBJECTIVE AND OBJECTIVE BOX
pt stable alert in NAD  no new complaints    PARANOID BEHAVIOR  ^PARANOID BEHAVIOR  No pertinent family history in first degree relatives  Handoff  Diabetes mellitus  Psychosis  Anxiety  Neurocognitive disorder  Psychosis, unspecified psychosis type  Type 2 diabetes mellitus without complication, without long-term current use of insulin  Diabetes mellitus  Psychosis  No significant past surgical history    HEALTH ISSUES - PROBLEM Dx:  Anxiety  Neurocognitive disorder  Psychosis, unspecified psychosis type: Psychosis, unspecified psychosis type  Type 2 diabetes mellitus without complication, without long-term current use of insulin: Type 2 diabetes mellitus without complication, without long-term current use of insulin  Diabetes mellitus: Diabetes mellitus  Psychosis: Psychosis        PAST MEDICAL & SURGICAL HISTORY:  Diabetes mellitus: -   - Diet controlled  Psychosis  No significant past surgical history    No Known Allergies      FAMILY HISTORY:  No pertinent family history in first degree relatives      folic acid 1 milliGRAM(s) Oral every 24 hours  LORazepam     Tablet 0.5 milliGRAM(s) Oral every 6 hours PRN  LORazepam     Tablet 0.5 milliGRAM(s) Oral every 12 hours  nicotine -  14 mG/24Hr(s) Patch 1 patch Transdermal daily  QUEtiapine 100 milliGRAM(s) Oral every 12 hours  QUEtiapine 25 milliGRAM(s) Oral every 6 hours PRN  sertraline 50 milliGRAM(s) Oral <User Schedule>      T(C): 36.2 (12-14-19 @ 08:35), Max: 37.5 (12-14-19 @ 05:35)  HR: 88 (12-14-19 @ 08:35) (88 - 91)  BP: 121/76 (12-14-19 @ 08:35) (105/72 - 143/67)  RR: 18 (12-14-19 @ 08:35) (18 - 18)  SpO2: --    PE;  general:  stable no acutge changes in nad    Lungs:    Heart:    EXT:    Neuro:  aelrt nod eciits      --  --  --  10  0.7  4.1  116      12-13    136  |  96<L>  |  10  ----------------------------<  116<H>  4.1   |  29  |  0.7    Ca    9.7      13 Dec 2019 16:00    TPro  8.5<H>  /  Alb  4.5  /  TBili  0.7  /  DBili  x   /  AST  68<H>  /  ALT  109<H>  /  AlkPhos  88  12-13      LIVER FUNCTIONS - ( 13 Dec 2019 16:00 )  Alb: 4.5 g/dL / Pro: 8.5 g/dL / ALK PHOS: 88 U/L / ALT: 109 U/L / AST: 68 U/L / GGT: x                 CAPILLARY BLOOD GLUCOSE

## 2019-12-14 NOTE — PROGRESS NOTE BEHAVIORAL HEALTH - SUMMARY
Patient is a 63 y o  female, previously domiciled with  in [place], then relocated to [place] to be cared for by family after 's passing, with history of polysubstance abuse (son reports past rehab for Benzodiazepine use d/o and other substances he is unaware of), psychiatric history of anxiety and chart history of Bipolar Disorder (collateral does not support this dx), recently discharged from inpatient psychiatric unit in [place] for decompensation (with d/c dx of Acute Psychosis), with apparent f/u with psychiatrist in [place] prior to admission (unable to reach Dr. Marci Berkowitz [phone] & [phone]), was brought in by family due to inability to care for patient.  She was admitted to medical floor for placement to a higher level of care.  Psychiatry was initially consulted to evaluate for appropriateness for inpatient psychiatric unit given erratic and agitated behavior.  At that time, patient did not appear to meet criteria for involuntary admission and assessment was that her symptoms were primarily due to neurocognitive disorder. Psychiatry was reconsulted for worsening sleep and increasing agitation. At time of reevaluation, patient's medication regimen was changed from Prolixin 1mg PO BID and Trazodone 100mg PO qhs to Seroquel 50mg PO qhs and 25mg PO PRN q8h. On this regimen, patient remained agitated, with disorganized thought process and behavior, anxious, somatically preoccupied, paranoid and with poor sleep.  Neurology consult reported suspicion of frontotemporal dementia w/ possible comorbid psychiatric component. Patient did not appear to be able to care for herself and met criteria for involuntary inpatient psychiatric admission for safety and medication optimization.   On admission to unit, patient remains anxious and confused. She is likely to benefit from medication optimization and investigation of her likely neurocognitive disorder.    #Neurocognitive disorder, suspected frontotemporal dementia with possible comorbid psychiatric features   -f/u MRI brain w/ and w/o contrast  -f/u with patient's son for past medication trial list, given history of EPS with past trials    #Anxiety  -continue Seroquel 100mg PO bid   -continue Zoloft 50mg PO qAM  -continue Ativan 0.5mg PO bid, with 0.5mg PRN q6h for anxiety

## 2019-12-14 NOTE — PROGRESS NOTE BEHAVIORAL HEALTH - NSBHFUPINTERVALHXFT_PSY_A_CORE
pt seen , discussed with  nursing staff ,chart reviewed.  She states that  she heard voices yesterday and voices told her that  " some body  will hurt her son "  [facility] . pt  states that she felt very anxious  and did not  complete MRI scan .Her thoughts are very disorganized.  She denies suicidal ideations intent or plan.  No acute event overnight. She states that she likes  to take Abilify

## 2019-12-14 NOTE — PROGRESS NOTE BEHAVIORAL HEALTH - NSBHCHARTREVIEWLAB_PSY_A_CORE FT
Hepatitis C Virus RNA Detection by PCR (11.25.19 @ 06:34)    Hepatitis C Virus RNA Detection by PCR: [id number]: Result Units: IU/mL  METHOD: Reverse Transcription Polymerase Chain Reaction  (RT-PCR) Assay for the quantitation of hepatitis C viral  (HCV) RNA using Abbott [id number]. IU/mL    Hepatitis C RNA, Log Value: 6.09: Result Units: log IU/mL  HCV: INTERPRETATION OF RESULTS  IU/mL                             INTERPRETATION  NOT DETECTED                      TARGET NOT DETECTED  < 12 IU/mL                        DETECTED  12 to 100,000,000 IU/mL           DETECTED  > 100,000,000 IU/mL               > ULQ (upper limit of                                           quantitation)  log IU/mL                         INTERPRETATION  NOT DETECTED                      TARGET NOT DETECTED  < 1.08 Log IU/mLDETECTED  1.08 - 8.00 Log IU/mL             DETECTED  > 8.00 Log  IU/mL                 > ULQ (upper limit of                                           quantitation)  A result of <12 IU/mL (< 1.08 Log IU/mL) indicates that  below the LLQ (lower limitof quantitation) the target HCV  RNA can be detected but is not quantifiable.  A result between 12 to 100,000,000 IU/mL (1.08 and 8.00 Log  IU/mL) indicates that the HCV RNA was detected and the  concentration falls between the LLQ and ULQ.  A resultof > 100,000,000 IU/mL (> 8.00 Log IU/mL)  indicates that the target HCV RNA was detected and is  greater than the ULQ (upper limit of quantitation).  METHOD: Reverse Transcription Polymerase Chain Reaction  (RT-PCR) Assay for the quantitation of  hepatitis C viral  (HCV) RNA using Abbott [id number].  Tests performed at [facility] [address]. LogIU/mL
Hepatitis C Virus RNA Detection by PCR (11.25.19 @ 06:34)    Hepatitis C Virus RNA Detection by PCR: [id number]: Result Units: IU/mL  METHOD: Reverse Transcription Polymerase Chain Reaction  (RT-PCR) Assay for the quantitation of hepatitis C viral  (HCV) RNA using Abbott [id number]. IU/mL    Hepatitis C RNA, Log Value: 6.09: Result Units: log IU/mL  HCV: INTERPRETATION OF RESULTS  IU/mL                             INTERPRETATION  NOT DETECTED                      TARGET NOT DETECTED  < 12 IU/mL                        DETECTED  12 to 100,000,000 IU/mL           DETECTED  > 100,000,000 IU/mL               > ULQ (upper limit of                                           quantitation)  log IU/mL                         INTERPRETATION  NOT DETECTED                      TARGET NOT DETECTED  < 1.08 Log IU/mLDETECTED  1.08 - 8.00 Log IU/mL             DETECTED  > 8.00 Log  IU/mL                 > ULQ (upper limit of                                           quantitation)  A result of <12 IU/mL (< 1.08 Log IU/mL) indicates that  below the LLQ (lower limitof quantitation) the target HCV  RNA can be detected but is not quantifiable.  A result between 12 to 100,000,000 IU/mL (1.08 and 8.00 Log  IU/mL) indicates that the HCV RNA was detected and the  concentration falls between the LLQ and ULQ.  A resultof > 100,000,000 IU/mL (> 8.00 Log IU/mL)  indicates that the target HCV RNA was detected and is  greater than the ULQ (upper limit of quantitation).  METHOD: Reverse Transcription Polymerase Chain Reaction  (RT-PCR) Assay for the quantitation of  hepatitis C viral  (HCV) RNA using Abbott [id number].  Tests performed at [facility] [address]. LogIU/mL

## 2019-12-15 PROCEDURE — 99231 SBSQ HOSP IP/OBS SF/LOW 25: CPT

## 2019-12-15 RX ADMIN — QUETIAPINE FUMARATE 100 MILLIGRAM(S): 200 TABLET, FILM COATED ORAL at 08:07

## 2019-12-15 RX ADMIN — Medication 0.5 MILLIGRAM(S): at 08:08

## 2019-12-15 RX ADMIN — Medication 1 PATCH: at 21:01

## 2019-12-15 RX ADMIN — Medication 1 PATCH: at 07:26

## 2019-12-15 RX ADMIN — Medication 1 MILLIGRAM(S): at 20:56

## 2019-12-15 RX ADMIN — QUETIAPINE FUMARATE 100 MILLIGRAM(S): 200 TABLET, FILM COATED ORAL at 20:56

## 2019-12-15 RX ADMIN — Medication 0.5 MILLIGRAM(S): at 20:57

## 2019-12-15 RX ADMIN — Medication 1 PATCH: at 16:14

## 2019-12-15 RX ADMIN — SERTRALINE 50 MILLIGRAM(S): 25 TABLET, FILM COATED ORAL at 08:07

## 2019-12-15 RX ADMIN — Medication 1 PATCH: at 08:07

## 2019-12-15 NOTE — PROGRESS NOTE BEHAVIORAL HEALTH - SUMMARY
Patient is a 63 y o  female, previously domiciled with  in [place], then relocated to [place] to be cared for by family after 's passing, with history of polysubstance abuse (son reports past rehab for Benzodiazepine use d/o and other substances he is unaware of), psychiatric history of anxiety and chart history of Bipolar Disorder (collateral does not support this dx), recently discharged from inpatient psychiatric unit in [place] for decompensation (with d/c dx of Acute Psychosis), with apparent f/u with psychiatrist in [place] prior to admission (unable to reach Dr. Marci Berkowitz [phone]), was brought in by family due to inability to care for patient.  She was admitted to medical floor for placement to a higher level of care.  Psychiatry was initially consulted to evaluate for appropriateness for inpatient psychiatric unit given erratic and agitated behavior.  At that time, patient did not appear to meet criteria for involuntary admission and assessment was that her symptoms were primarily due to neurocognitive disorder. Psychiatry was reconsulted for worsening sleep and increasing agitation. At time of reevaluation, patient's medication regimen was changed from Prolixin 1mg PO BID and Trazodone 100mg PO qhs to Seroquel 50mg PO qhs and 25mg PO PRN q8h. On this regimen, patient remained agitated, with disorganized thought process and behavior, anxious, somatically preoccupied, paranoid and with poor sleep.  Neurology consult reported suspicion of frontotemporal dementia w/ possible comorbid psychiatric component. Patient did not appear to be able to care for herself and met criteria for involuntary inpatient psychiatric admission for safety and medication optimization.   On admission to unit, patient remains anxious and confused. She is likely to benefit from medication optimization and investigation of her likely neurocognitive disorder.    #Neurocognitive disorder, suspected frontotemporal dementia with possible comorbid psychiatric features   -f/u MRI brain w/ and w/o contrast  -f/u with patient's son for past medication trial list, given history of EPS with past trials    #Anxiety  -continue Seroquel 100mg PO bid   -continue Zoloft 50mg PO qAM  -continue Ativan 0.5mg PO bid, with 0.5mg PRN q6h for anxiety

## 2019-12-16 PROCEDURE — 99231 SBSQ HOSP IP/OBS SF/LOW 25: CPT

## 2019-12-16 RX ADMIN — Medication 1 PATCH: at 07:33

## 2019-12-16 RX ADMIN — Medication 1 PATCH: at 08:12

## 2019-12-16 RX ADMIN — Medication 1 MILLIGRAM(S): at 20:25

## 2019-12-16 RX ADMIN — QUETIAPINE FUMARATE 100 MILLIGRAM(S): 200 TABLET, FILM COATED ORAL at 08:12

## 2019-12-16 RX ADMIN — SERTRALINE 50 MILLIGRAM(S): 25 TABLET, FILM COATED ORAL at 08:12

## 2019-12-16 RX ADMIN — QUETIAPINE FUMARATE 100 MILLIGRAM(S): 200 TABLET, FILM COATED ORAL at 20:26

## 2019-12-16 RX ADMIN — Medication 0.5 MILLIGRAM(S): at 08:12

## 2019-12-16 RX ADMIN — Medication 1 PATCH: at 20:31

## 2019-12-16 RX ADMIN — Medication 0.5 MILLIGRAM(S): at 20:26

## 2019-12-16 NOTE — PROGRESS NOTE BEHAVIORAL HEALTH - SUMMARY
Patient is a 63 y o  female, previously domiciled with  in [place], then relocated to [place] to be cared for by family after 's passing, with history of polysubstance abuse (son reports past rehab for Benzodiazepine use d/o and other substances he is unaware of), psychiatric history of anxiety and chart history of Bipolar Disorder (collateral does not support this dx), recently discharged from inpatient psychiatric unit in [place] for decompensation (with d/c dx of Acute Psychosis), with apparent f/u with psychiatrist in [place] prior to admission (unable to reach Dr. Marci Berkowitz [phone]), was brought in by family due to inability to care for patient.  She was admitted to medical floor for placement to a higher level of care.  Psychiatry was initially consulted to evaluate for appropriateness for inpatient psychiatric unit given erratic and agitated behavior.  At that time, patient did not appear to meet criteria for involuntary admission and assessment was that her symptoms were primarily due to neurocognitive disorder. Psychiatry was reconsulted for worsening sleep and increasing agitation. At time of reevaluation, patient's medication regimen was changed from Prolixin 1mg PO BID and Trazodone 100mg PO qhs to Seroquel 50mg PO qhs and 25mg PO PRN q8h. On this regimen, patient remained agitated, with disorganized thought process and behavior, anxious, somatically preoccupied, paranoid and with poor sleep.  Neurology consult reported suspicion of frontotemporal dementia w/ possible comorbid psychiatric component. Patient did not appear to be able to care for herself and met criteria for involuntary inpatient psychiatric admission for safety and medication optimization.   On admission to unit, patient remains anxious and confused. She is likely to benefit from medication optimization and investigation of her likely neurocognitive disorder.    #Neurocognitive disorder, suspected frontotemporal dementia with possible comorbid psychiatric features   -f/u MRI brain w/ and w/o contrast  -f/u with patient's son for past medication trial list, given history of EPS with past trials    #Anxiety  -continue Seroquel 100mg PO bid   -continue Zoloft 50mg PO qAM  -continue Ativan 0.5mg PO bid, with 0.5mg PRN q6h for anxiety

## 2019-12-16 NOTE — PROGRESS NOTE BEHAVIORAL HEALTH - NSBHFUPINTERVALHXFT_PSY_A_CORE
Per staff, patient has been disorganized and fearful on the unit.  Patient seen and examined. She reports that she was unable to proceed with MRI on the weekend because she was feeling claustrophobic. She states that she is willing to try again "but not right now". She repeatedly asked "am I dying?" but could not provide any reason why she would be dying. Patient also reported she could not recall where her room was and wanted help finding it. She reports her family did not yet come to visit her but may do so later on. She denies any suicidal or homicidal ideation.  Attempted to reach son (), no reply at number.

## 2019-12-17 DIAGNOSIS — F23 BRIEF PSYCHOTIC DISORDER: ICD-10-CM

## 2019-12-17 DIAGNOSIS — G31.09 OTHER FRONTOTEMPORAL NEUROCOGNITIVE DISORDER: ICD-10-CM

## 2019-12-17 DIAGNOSIS — Z75.1 PERSON AWAITING ADMISSION TO ADEQUATE FACILITY ELSEWHERE: ICD-10-CM

## 2019-12-17 DIAGNOSIS — R41.9 UNSPECIFIED SYMPTOMS AND SIGNS INVOLVING COGNITIVE FUNCTIONS AND AWARENESS: ICD-10-CM

## 2019-12-17 DIAGNOSIS — F29 UNSPECIFIED PSYCHOSIS NOT DUE TO A SUBSTANCE OR KNOWN PHYSIOLOGICAL CONDITION: ICD-10-CM

## 2019-12-17 DIAGNOSIS — F43.21 ADJUSTMENT DISORDER WITH DEPRESSED MOOD: ICD-10-CM

## 2019-12-17 DIAGNOSIS — E53.8 DEFICIENCY OF OTHER SPECIFIED B GROUP VITAMINS: ICD-10-CM

## 2019-12-17 DIAGNOSIS — F17.210 NICOTINE DEPENDENCE, CIGARETTES, UNCOMPLICATED: ICD-10-CM

## 2019-12-17 DIAGNOSIS — F41.9 ANXIETY DISORDER, UNSPECIFIED: ICD-10-CM

## 2019-12-17 DIAGNOSIS — B18.2 CHRONIC VIRAL HEPATITIS C: ICD-10-CM

## 2019-12-17 DIAGNOSIS — F02.81 DEMENTIA IN OTHER DISEASES CLASSIFIED ELSEWHERE, UNSPECIFIED SEVERITY, WITH BEHAVIORAL DISTURBANCE: ICD-10-CM

## 2019-12-17 DIAGNOSIS — F19.10 OTHER PSYCHOACTIVE SUBSTANCE ABUSE, UNCOMPLICATED: ICD-10-CM

## 2019-12-17 PROCEDURE — 99231 SBSQ HOSP IP/OBS SF/LOW 25: CPT

## 2019-12-17 RX ADMIN — Medication 1 PATCH: at 18:17

## 2019-12-17 RX ADMIN — Medication 1 PATCH: at 07:28

## 2019-12-17 RX ADMIN — SERTRALINE 50 MILLIGRAM(S): 25 TABLET, FILM COATED ORAL at 08:09

## 2019-12-17 RX ADMIN — Medication 1 MILLIGRAM(S): at 20:03

## 2019-12-17 RX ADMIN — Medication 0.5 MILLIGRAM(S): at 20:02

## 2019-12-17 RX ADMIN — Medication 0.5 MILLIGRAM(S): at 08:09

## 2019-12-17 RX ADMIN — QUETIAPINE FUMARATE 100 MILLIGRAM(S): 200 TABLET, FILM COATED ORAL at 08:09

## 2019-12-17 RX ADMIN — QUETIAPINE FUMARATE 100 MILLIGRAM(S): 200 TABLET, FILM COATED ORAL at 20:02

## 2019-12-17 RX ADMIN — Medication 1 PATCH: at 08:09

## 2019-12-17 NOTE — PROGRESS NOTE ADULT - SUBJECTIVE AND OBJECTIVE BOX
pt stable alert in NAD  no new complaints    PARANOID BEHAVIOR  ^PARANOID BEHAVIOR  No pertinent family history in first degree relatives  Handoff  Diabetes mellitus  Psychosis  Anxiety  Neurocognitive disorder  Psychosis, unspecified psychosis type  Type 2 diabetes mellitus without complication, without long-term current use of insulin  Diabetes mellitus  Psychosis  No significant past surgical history    HEALTH ISSUES - PROBLEM Dx:  Anxiety  Neurocognitive disorder  Psychosis, unspecified psychosis type: Psychosis, unspecified psychosis type  Type 2 diabetes mellitus without complication, without long-term current use of insulin: Type 2 diabetes mellitus without complication, without long-term current use of insulin  Diabetes mellitus: Diabetes mellitus  Psychosis: Psychosis        PAST MEDICAL & SURGICAL HISTORY:  Diabetes mellitus: -   - Diet controlled  Psychosis  No significant past surgical history    No Known Allergies      FAMILY HISTORY:  No pertinent family history in first degree relatives      folic acid 1 milliGRAM(s) Oral every 24 hours  LORazepam     Tablet 0.5 milliGRAM(s) Oral every 6 hours PRN  LORazepam     Tablet 0.5 milliGRAM(s) Oral every 12 hours  nicotine -  14 mG/24Hr(s) Patch 1 patch Transdermal daily  QUEtiapine 100 milliGRAM(s) Oral every 12 hours  QUEtiapine 25 milliGRAM(s) Oral every 6 hours PRN  sertraline 50 milliGRAM(s) Oral <User Schedule>      T(C): 35.9 (12-17-19 @ 05:47), Max: 36.2 (12-16-19 @ 10:26)  HR: 72 (12-17-19 @ 05:47) (72 - 101)  BP: 97/55 (12-17-19 @ 05:47) (97/55 - 136/66)  RR: 18 (12-17-19 @ 05:47) (18 - 20)  SpO2: --    PE;  general:  stable no acue cahnges    Lungs:    Heart:    EXT:    Neuro:  aelrt nod eficits                          CAPILLARY BLOOD GLUCOSE

## 2019-12-17 NOTE — PROGRESS NOTE BEHAVIORAL HEALTH - NSBHFUPINTERVALHXFT_PSY_A_CORE
Per staff, patient has been repeating "am I dying?" frequently on the unit.  Patient seen and examined. She repeatedly asks "do I get life?". When asked to clarify what she means, she repeats "do I get life". She occasionally would additionally ask "Am I dying?". When told no, she states "oh, good", but then almost immediately once again asks "Am I dying?". She denies having any other issues when asked, but immediately returns to asking "do I get life?". She denies any thoughts of hurting herself or others.  Tried again to reach son without reply at given number.

## 2019-12-17 NOTE — PROGRESS NOTE BEHAVIORAL HEALTH - SUMMARY
Patient is a 63 y o  female, previously domiciled with  in [place], then relocated to [place] to be cared for by family after 's passing, with history of polysubstance abuse (son reports past rehab for Benzodiazepine use d/o and other substances he is unaware of), psychiatric history of anxiety and chart history of Bipolar Disorder (collateral does not support this dx), recently discharged from inpatient psychiatric unit in [place] for decompensation (with d/c dx of Acute Psychosis), with apparent f/u with psychiatrist in [place] prior to admission (unable to reach Dr. Marci Berkowitz [phone]), was brought in by family due to inability to care for patient.  She was admitted to medical floor for placement to a higher level of care.  Psychiatry was initially consulted to evaluate for appropriateness for inpatient psychiatric unit given erratic and agitated behavior.  At that time, patient did not appear to meet criteria for involuntary admission and assessment was that her symptoms were primarily due to neurocognitive disorder. Psychiatry was reconsulted for worsening sleep and increasing agitation. At time of reevaluation, patient's medication regimen was changed from Prolixin 1mg PO BID and Trazodone 100mg PO qhs to Seroquel 50mg PO qhs and 25mg PO PRN q8h. On this regimen, patient remained agitated, with disorganized thought process and behavior, anxious, somatically preoccupied, paranoid and with poor sleep.  Neurology consult reported suspicion of frontotemporal dementia w/ possible comorbid psychiatric component. Patient did not appear to be able to care for herself and met criteria for involuntary inpatient psychiatric admission for safety and medication optimization.   On admission to unit, patient remains anxious and confused. She is likely to benefit from medication optimization and investigation of her likely neurocognitive disorder.    #Neurocognitive disorder, suspected frontotemporal dementia with possible comorbid psychiatric features   -MRI brain w/ and w/o contrast non-diagnostic due to patient inability to complete exam  -f/u with patient's son for past medication trial list, given history of EPS with past trials    #Anxiety  -continue Seroquel 100mg PO bid   -continue Zoloft 50mg PO qAM  -continue Ativan 0.5mg PO bid, with 0.5mg PRN q6h for anxiety

## 2019-12-18 PROCEDURE — 99231 SBSQ HOSP IP/OBS SF/LOW 25: CPT

## 2019-12-18 RX ADMIN — Medication 1 MILLIGRAM(S): at 17:25

## 2019-12-18 RX ADMIN — Medication 1 PATCH: at 08:37

## 2019-12-18 RX ADMIN — Medication 1 PATCH: at 20:49

## 2019-12-18 RX ADMIN — QUETIAPINE FUMARATE 100 MILLIGRAM(S): 200 TABLET, FILM COATED ORAL at 20:52

## 2019-12-18 RX ADMIN — QUETIAPINE FUMARATE 100 MILLIGRAM(S): 200 TABLET, FILM COATED ORAL at 08:33

## 2019-12-18 RX ADMIN — SERTRALINE 50 MILLIGRAM(S): 25 TABLET, FILM COATED ORAL at 08:33

## 2019-12-18 RX ADMIN — Medication 1 PATCH: at 08:33

## 2019-12-18 RX ADMIN — Medication 0.5 MILLIGRAM(S): at 20:52

## 2019-12-18 RX ADMIN — Medication 0.5 MILLIGRAM(S): at 08:34

## 2019-12-18 NOTE — PROGRESS NOTE BEHAVIORAL HEALTH - NSBHFUPINTERVALCCFT_PSY_A_CORE
Pt is isolative and disorganized; asks repeatedly "do I get light?".  no behavior issues. No s/h ideation

## 2019-12-19 PROCEDURE — 99231 SBSQ HOSP IP/OBS SF/LOW 25: CPT

## 2019-12-19 RX ADMIN — Medication 0.5 MILLIGRAM(S): at 08:27

## 2019-12-19 RX ADMIN — Medication 1 PATCH: at 07:00

## 2019-12-19 RX ADMIN — SERTRALINE 50 MILLIGRAM(S): 25 TABLET, FILM COATED ORAL at 07:47

## 2019-12-19 RX ADMIN — Medication 1 PATCH: at 08:27

## 2019-12-19 RX ADMIN — Medication 1 MILLIGRAM(S): at 17:25

## 2019-12-19 RX ADMIN — Medication 0.5 MILLIGRAM(S): at 20:16

## 2019-12-19 RX ADMIN — QUETIAPINE FUMARATE 100 MILLIGRAM(S): 200 TABLET, FILM COATED ORAL at 20:16

## 2019-12-19 RX ADMIN — QUETIAPINE FUMARATE 100 MILLIGRAM(S): 200 TABLET, FILM COATED ORAL at 08:27

## 2019-12-19 NOTE — PROGRESS NOTE ADULT - SUBJECTIVE AND OBJECTIVE BOX
pt stable alert in NAD  no new complaints    PARANOID BEHAVIOR  ^PARANOID BEHAVIOR  No pertinent family history in first degree relatives  Handoff  Diabetes mellitus  Psychosis  Anxiety  Neurocognitive disorder  Psychosis, unspecified psychosis type  Type 2 diabetes mellitus without complication, without long-term current use of insulin  Diabetes mellitus  Psychosis  No significant past surgical history    HEALTH ISSUES - PROBLEM Dx:  Anxiety  Neurocognitive disorder  Psychosis, unspecified psychosis type: Psychosis, unspecified psychosis type  Type 2 diabetes mellitus without complication, without long-term current use of insulin: Type 2 diabetes mellitus without complication, without long-term current use of insulin  Diabetes mellitus: Diabetes mellitus  Psychosis: Psychosis        PAST MEDICAL & SURGICAL HISTORY:  Diabetes mellitus: -   - Diet controlled  Psychosis  No significant past surgical history    No Known Allergies      FAMILY HISTORY:  No pertinent family history in first degree relatives      folic acid 1 milliGRAM(s) Oral every 24 hours  LORazepam     Tablet 0.5 milliGRAM(s) Oral every 6 hours PRN  LORazepam     Tablet 0.5 milliGRAM(s) Oral every 12 hours  nicotine -  14 mG/24Hr(s) Patch 1 patch Transdermal daily  QUEtiapine 100 milliGRAM(s) Oral every 12 hours  QUEtiapine 25 milliGRAM(s) Oral every 6 hours PRN  sertraline 50 milliGRAM(s) Oral <User Schedule>      T(C): 36.8 (12-19-19 @ 06:07), Max: 36.8 (12-19-19 @ 06:07)  HR: 70 (12-19-19 @ 06:07) (70 - 89)  BP: 115/76 (12-19-19 @ 06:07) (108/77 - 115/76)  RR: 16 (12-19-19 @ 06:07) (16 - 18)  SpO2: --    PE;  general:  no changes raghav d in nad    Lungs:    Heart:    EXT:    Neuro:no deficits                          CAPILLARY BLOOD GLUCOSE

## 2019-12-19 NOTE — PROGRESS NOTE BEHAVIORAL HEALTH - SUMMARY
Patient is a 63 y o  female, previously domiciled with  in [place], then relocated to [place] to be cared for by family after 's passing, with history of polysubstance abuse (son reports past rehab for Benzodiazepine use d/o and other substances he is unaware of), psychiatric history of anxiety and chart history of Bipolar Disorder (collateral does not support this dx), recently discharged from inpatient psychiatric unit in [place] for decompensation (with d/c dx of Acute Psychosis), with apparent f/u with psychiatrist in [place] prior to admission (unable to reach Dr. Marci Berkowitz [phone] & [phone]), was brought in by family due to inability to care for patient.  She was admitted to medical floor for placement to a higher level of care.  Psychiatry was initially consulted to evaluate for appropriateness for inpatient psychiatric unit given erratic and agitated behavior.  At that time, patient did not appear to meet criteria for involuntary admission and assessment was that her symptoms were primarily due to neurocognitive disorder. Psychiatry was reconsulted for worsening sleep and increasing agitation. At time of reevaluation, patient's medication regimen was changed from Prolixin 1mg PO BID and Trazodone 100mg PO qhs to Seroquel 50mg PO qhs and 25mg PO PRN q8h. On this regimen, patient remained agitated, with disorganized thought process and behavior, anxious, somatically preoccupied, paranoid and with poor sleep.  Neurology consult reported suspicion of frontotemporal dementia w/ possible comorbid psychiatric component. Patient did not appear to be able to care for herself and met criteria for involuntary inpatient psychiatric admission for safety and medication optimization.   On admission to unit, patient remains anxious and confused. She is likely to benefit from medication optimization and investigation of her likely neurocognitive disorder.    # Neurocognitive Disorder - suspected to be 2/2 frontotemporal dementia w/ possible comorbid psychiatric features, stable  - MRI brain w/ and w/o contrast nondiagnostic d/t pts inability to complete exam  - f/u w/ pts son for past medication trial list given hx of EPS w/ past trials    # Anxiety - stable  - c/w Seroquel 100 mg po BID  - c/w Zoloft 50 mg qAM  - c/w Ativan 0.5 mg BID w/ 0.5 mg PRN q6H for anxiety Patient is a 63 y o  female, previously domiciled with  in [place], then relocated to [place] to be cared for by family after 's passing, with history of polysubstance abuse (son reports past rehab for Benzodiazepine use d/o and other substances he is unaware of), psychiatric history of anxiety and chart history of Bipolar Disorder (collateral does not support this dx), recently discharged from inpatient psychiatric unit in [place] for decompensation (with d/c dx of Acute Psychosis), with apparent f/u with psychiatrist in [place] prior to admission (unable to reach Dr. Marci Berkowitz [phone]), was brought in by family due to inability to care for patient.  She was admitted to medical floor for placement to a higher level of care.  Psychiatry was initially consulted to evaluate for appropriateness for inpatient psychiatric unit given erratic and agitated behavior.  At that time, patient did not appear to meet criteria for involuntary admission and assessment was that her symptoms were primarily due to neurocognitive disorder. Psychiatry was reconsulted for worsening sleep and increasing agitation. At time of reevaluation, patient's medication regimen was changed from Prolixin 1mg PO BID and Trazodone 100mg PO qhs to Seroquel 50mg PO qhs and 25mg PO PRN q8h. On this regimen, patient remained agitated, with disorganized thought process and behavior, anxious, somatically preoccupied, paranoid and with poor sleep.  Neurology consult reported suspicion of frontotemporal dementia w/ possible comorbid psychiatric component. Patient did not appear to be able to care for herself and met criteria for involuntary inpatient psychiatric admission for safety and medication optimization.   On admission to unit, patient remains anxious and confused.   # Neurocognitive Disorder - suspected to be 2/2 frontotemporal dementia w/ possible comorbid psychiatric features, stable  - MRI brain w/ and w/o contrast nondiagnostic d/t pts inability to complete exam  - f/u w/ pts son for past medication trial list given hx of EPS w/ past trials    # Anxiety - stable  - c/w Seroquel 100 mg po BID  - c/w Zoloft 50 mg qAM  - c/w Ativan 0.5 mg BID w/ 0.5 mg PRN q6H for anxiety Patient is a 63 y o  female, previously domiciled with  in [place], then relocated to [place] to be cared for by family after 's passing, with history of polysubstance abuse (son reports past rehab for Benzodiazepine use d/o and other substances he is unaware of), psychiatric history of anxiety and chart history of Bipolar Disorder (collateral does not support this dx), recently discharged from inpatient psychiatric unit in [place] for decompensation (with d/c dx of Acute Psychosis), with apparent f/u with psychiatrist in [place] prior to admission (unable to reach Dr. Marci Berkowitz [phone]), was brought in by family due to inability to care for patient.  She was admitted to medical floor for placement to a higher level of care.  Psychiatry was initially consulted to evaluate for appropriateness for inpatient psychiatric unit given erratic and agitated behavior.  At that time, patient did not appear to meet criteria for involuntary admission and assessment was that her symptoms were primarily due to neurocognitive disorder. Psychiatry was reconsulted for worsening sleep and increasing agitation. At time of reevaluation, patient's medication regimen was changed from Prolixin 1mg PO BID and Trazodone 100mg PO qhs to Seroquel 50mg PO qhs and 25mg PO PRN q8h. On this regimen, patient remained agitated, with disorganized thought process and behavior, anxious, somatically preoccupied, paranoid and with poor sleep.  Neurology consult reported suspicion of frontotemporal dementia w/ possible comorbid psychiatric component. Patient did not appear to be able to care for herself and met criteria for involuntary inpatient psychiatric admission for safety and medication optimization. On admission to unit, patient remains anxious and confused.     # Neurocognitive Disorder - suspected to be 2/2 frontotemporal dementia w/ possible comorbid psychiatric features, stable  - MRI brain w/ and w/o contrast nondiagnostic d/t pts inability to complete exam  - f/u w/ pts son for past medication trial list given hx of EPS w/ past trials  - C/w inpatient treatment     # Anxiety - stable  - c/w Seroquel 100 mg po BID  - c/w Zoloft 50 mg qAM  - c/w Ativan 0.5 mg BID w/ 0.5 mg PRN q6H for anxiety

## 2019-12-19 NOTE — PROGRESS NOTE BEHAVIORAL HEALTH - NSBHFUPINTERVALHXFT_PSY_A_CORE
Pt was resting comfortably in bed when interviewed. Pt continues to repeatedly ask "am I dying?" throughout interactions. She does not respond when asked why she thinks she may be dying. She is initially comforted when told she is not dying but almost immediately asks again "am I dying?". Pt seems anxious and has a constricted affect. She gives minimal or one-worded responses to questions. She has no other complaints today. She denies any thoughts of hurting herself or others.    Pt scored 18/30 on MMSE and 8/30 on MOCA this morning. The most substantial deficits were seen in orientation (forgets or incorrectly recalls year, day of the week, month, season, date; can correctly name city, state; knows she is in a hospital does not recall name), visuospatial/executive functioning, attention, abstraction and delayed recall.

## 2019-12-20 LAB — ERYTHROCYTE [SEDIMENTATION RATE] IN BLOOD: 20 MM/HR — SIGNIFICANT CHANGE UP (ref 0–20)

## 2019-12-20 PROCEDURE — 99231 SBSQ HOSP IP/OBS SF/LOW 25: CPT

## 2019-12-20 RX ORDER — SERTRALINE 25 MG/1
75 TABLET, FILM COATED ORAL
Refills: 0 | Status: DISCONTINUED | OUTPATIENT
Start: 2019-12-21 | End: 2020-01-23

## 2019-12-20 RX ADMIN — Medication 0.5 MILLIGRAM(S): at 20:36

## 2019-12-20 RX ADMIN — QUETIAPINE FUMARATE 100 MILLIGRAM(S): 200 TABLET, FILM COATED ORAL at 08:31

## 2019-12-20 RX ADMIN — Medication 1 MILLIGRAM(S): at 20:36

## 2019-12-20 RX ADMIN — SERTRALINE 50 MILLIGRAM(S): 25 TABLET, FILM COATED ORAL at 08:31

## 2019-12-20 RX ADMIN — Medication 0.5 MILLIGRAM(S): at 08:33

## 2019-12-20 RX ADMIN — Medication 0.5 MILLIGRAM(S): at 20:37

## 2019-12-20 RX ADMIN — QUETIAPINE FUMARATE 100 MILLIGRAM(S): 200 TABLET, FILM COATED ORAL at 20:36

## 2019-12-20 NOTE — PROGRESS NOTE BEHAVIORAL HEALTH - NSBHFUPINTERVALHXFT_PSY_A_CORE
Pt shows no change Pt shows no change from previous day and continues to perseverate on if she's dying. She continues to repeatedly ask "am I dying?" throughout all interactions. When meeting Pt shows no change from previous day and continues to perseverate on if she's dying. She continues to repeatedly ask "am I dying?" throughout all interactions. When meeting with the treatment team today she would Pt shows no change from previous day and continues to perseverate on if she's dying. She continues to repeatedly ask "am I dying?" throughout all interactions. When meeting with the treatment team today she repeatedly asked "am I dying?", "am I on death row?" and "am I going to nursing home?". When asked why she thinks she may be in nursing home she responded "I don't know, I'm just asking". She is oriented to self. She was better oriented to the day and month today, although she was still unable to give the correct date (pt stated it was November or December 27th). She was unsure of the year and eventually stated that it was 1920. She was less oriented to place and was unsure of where she was. She appears anxious and confused and has a constricted affect. She has no other complaints today and had no overnight events.

## 2019-12-20 NOTE — PROGRESS NOTE BEHAVIORAL HEALTH - NSBHCHARTREVIEWIMAGING_PSY_A_CORE FT
< from: MR Head No Cont (12.14.19 @ 12:25) >      EXAM:  MR BRAIN            PROCEDURE DATE:  12/14/2019            INTERPRETATION:  Clinical History / Reason for exam: Dementia, change in   mental status, for evaluation of frontal temporal dementia.    MRI OF THE BRAIN WITHOUT CONTRAST    TECHNIQUE:    Diffusion weighted images were obtained.    The patient refused further examination.    FINDINGS:    Diffusion weighted images demonstrate no MRI evidence to suggest acute   ischemic change.    IMPRESSION:    Nondiagnostic examination.                  CHRIS BERG M.D., ATTENDING RADIOLOGIST  This document has been electronically signed. Dec 14 2019  1:13PM        < end of copied text >
< from: MR Head No Cont (12.14.19 @ 12:25) >    EXAM:  MR BRAIN            PROCEDURE DATE:  12/14/2019            INTERPRETATION:  Clinical History / Reason for exam: Dementia, change in   mental status, for evaluation of frontal temporal dementia.    MRI OF THE BRAIN WITHOUT CONTRAST    TECHNIQUE:    Diffusion weighted images were obtained.    The patient refused further examination.    FINDINGS:    Diffusion weighted images demonstrate no MRI evidence to suggest acute   ischemic change.    IMPRESSION:    Nondiagnostic examination.      CHRIS BERG M.D., ATTENDING RADIOLOGIST  This document has been electronically signed. Dec 14 2019  1:13PM    < end of copied text >
< from: CT Head No Cont (11.20.19 @ 22:32) >    EXAM:  CT BRAIN          PROCEDURE DATE:  11/20/2019      INTERPRETATION:  Clinical History / Reason for exam: Psychosis.    Technique: Multiple contiguous axial CT images of the head were obtained    from the base of the skull to the vertex without administration of   intravenous contrast. Coronal and sagittal reformats were obtained.    Comparison: None.    Findings:    The ventricles, basal cisterns and sulcal pattern are within normal   limits for the patient's stated age.      Patchy periventricular and deep white matter hypodensities consistent   with chronic microvascular ischemic changes.    Grey-white differentiation is preserved.    There is no acute mass effect, midline shift or intracranial hemorrhage.      The calvarium is intact.    The visualized paranasal sinuses and bilateral mastoid complexes are   unremarkable. The globes and orbits are unremarkable.    Beam hardening artifact is noted overlying the brain stem and posterior   fossa which is inherent to CT in this location.    Impression:     No CT evidence of acute intracranial pathology.      SARAH ROACH M.D., RESIDENT RADIOLOGIST  This document has been electronically signed.  MANI QUINTEROS M.D., ATTENDING RADIOLOGIST  This document has been electronically signed. Nov 20 2019 10:45PM    < end of copied text >
< from: CT Head No Cont (11.20.19 @ 22:32) >    EXAM:  CT BRAIN          PROCEDURE DATE:  11/20/2019      INTERPRETATION:  Clinical History / Reason for exam: Psychosis.    Technique: Multiple contiguous axial CT images of the head were obtained    from the base of the skull to the vertex without administration of   intravenous contrast. Coronal and sagittal reformats were obtained.    Comparison: None.    Findings:    The ventricles, basal cisterns and sulcal pattern are within normal   limits for the patient's stated age.      Patchy periventricular and deep white matter hypodensities consistent   with chronic microvascular ischemic changes.    Grey-white differentiation is preserved.    There is no acute mass effect, midline shift or intracranial hemorrhage.      The calvarium is intact.    The visualized paranasal sinuses and bilateral mastoid complexes are   unremarkable. The globes and orbits are unremarkable.    Beam hardening artifact is noted overlying the brain stem and posterior   fossa which is inherent to CT in this location.    Impression:     No CT evidence of acute intracranial pathology.      SARAH ROACH M.D., RESIDENT RADIOLOGIST  This document has been electronically signed.  MANI QUINTEROS M.D., ATTENDING RADIOLOGIST  This document has been electronically signed. Nov 20 2019 10:45PM    < end of copied text >
< from: MR Head No Cont (12.14.19 @ 12:25) >      EXAM:  MR BRAIN            PROCEDURE DATE:  12/14/2019            INTERPRETATION:  Clinical History / Reason for exam: Dementia, change in   mental status, for evaluation of frontal temporal dementia.    MRI OF THE BRAIN WITHOUT CONTRAST    TECHNIQUE:    Diffusion weighted images were obtained.    The patient refused further examination.    FINDINGS:    Diffusion weighted images demonstrate no MRI evidence to suggest acute   ischemic change.    IMPRESSION:    Nondiagnostic examination.                  CHRIS BERG M.D., ATTENDING RADIOLOGIST  This document has been electronically signed. Dec 14 2019  1:13PM                < end of copied text >

## 2019-12-20 NOTE — PROGRESS NOTE BEHAVIORAL HEALTH - NSBHCHARTREVIEWINVESTIGATE_PSY_A_CORE FT
< from: 12 Lead ECG (12.10.19 @ 18:58) >    Ventricular Rate 78 BPM    Atrial Rate 78 BPM    QRS Duration 74 ms    Q-T Interval 366 ms    QTC Calculation(Bezet) 417 ms    R Axis 52 degrees    T Axis 59 degrees    Diagnosis Line Sinus rhythm  Normal ECG    Confirmed by Harjeet Engle ([id number]) on 12/10/2019 8:22:42 PM    < end of copied text >
< from: 12 Lead ECG (12.10.19 @ 18:58) >      Diagnosis Line Sinus rhythm  Normal ECG    Confirmed by Harjeet Engle ([id number]) on 12/10/2019 8:22:42 PM    < end of copied text >

## 2019-12-20 NOTE — PROGRESS NOTE BEHAVIORAL HEALTH - ORIENTATION OTHER
states she "forgot" what year it is, that it is the 8th or 9th of November, and she is in a hospital in [place]
states it is "19", and is in a hospital in [place]
states it is Fall 1920, and is in a hospital in [place]
states it is "19", and is in a hospital in [place]
states she "forgot" what year it is, that it is the 8th or 9th of November, and she is in a hospital in [place]
states it is Fall 1920, and is in a hospital in [place]
states it is Fall 1920, and is in a hospital in [place]
states it is Fall 1920, and is in a hospital in [place]

## 2019-12-20 NOTE — PROGRESS NOTE BEHAVIORAL HEALTH - SUMMARY
Patient is a 63 y o  female, previously domiciled with  in [place], then relocated to [place] to be cared for by family after 's passing, with history of polysubstance abuse (son reports past rehab for Benzodiazepine use d/o and other substances he is unaware of), psychiatric history of anxiety and chart history of Bipolar Disorder (collateral does not support this dx), recently discharged from inpatient psychiatric unit in [place] for decompensation (with d/c dx of Acute Psychosis), with apparent f/u with psychiatrist in [place] prior to admission (unable to reach Dr. Marci Berkowitz [phone] & [phone]), was brought in by family due to inability to care for patient.  She was admitted to medical floor for placement to a higher level of care.  Psychiatry was initially consulted to evaluate for appropriateness for inpatient psychiatric unit given erratic and agitated behavior.  At that time, patient did not appear to meet criteria for involuntary admission and assessment was that her symptoms were primarily due to neurocognitive disorder. Psychiatry was reconsulted for worsening sleep and increasing agitation. At time of reevaluation, patient's medication regimen was changed from Prolixin 1mg PO BID and Trazodone 100mg PO qhs to Seroquel 50mg PO qhs and 25mg PO PRN q8h. On this regimen, patient remained agitated, with disorganized thought process and behavior, anxious, somatically preoccupied, paranoid and with poor sleep.  Neurology consult reported suspicion of frontotemporal dementia w/ possible comorbid psychiatric component. Patient did not appear to be able to care for herself and met criteria for involuntary inpatient psychiatric admission for safety and medication optimization. On admission to unit, patient remains anxious and confused.    # Neurocognitive Disorder - suspected to be 2/2 frontotemporal dementia w/ possible comorbid psychiatric features, stable  - MRI brain w/ and w/o contrast nondiagnostic d/t pts inability to complete exam  - f/u w/ pts son for past medication trial list given hx of EPS w/ past trials; multiple attempts have been made to contact son at provided phone number have been unsuccessful, will continue to try to contact son   - C/w inpatient treatment     # Anxiety - stable  - c/w Seroquel 100 mg po BID  - c/w Zoloft 50 mg qAM  - c/w Ativan 0.5 mg BID w/ 0.5 mg PRN q6H for anxiety. Patient is a 63 y o  female, previously domiciled with  in [place], then relocated to [place] to be cared for by family after 's passing, with history of polysubstance abuse (son reports past rehab for Benzodiazepine use d/o and other substances he is unaware of), psychiatric history of anxiety and chart history of Bipolar Disorder (collateral does not support this dx), recently discharged from inpatient psychiatric unit in [place] for decompensation (with d/c dx of Acute Psychosis), with apparent f/u with psychiatrist in [place] prior to admission (unable to reach Dr. Marci Berkowitz [phone] & [phone]), was brought in by family due to inability to care for patient.  She was admitted to medical floor for placement to a higher level of care.  Psychiatry was initially consulted to evaluate for appropriateness for inpatient psychiatric unit given erratic and agitated behavior.  At that time, patient did not appear to meet criteria for involuntary admission and assessment was that her symptoms were primarily due to neurocognitive disorder. Psychiatry was reconsulted for worsening sleep and increasing agitation. At time of reevaluation, patient's medication regimen was changed from Prolixin 1mg PO BID and Trazodone 100mg PO qhs to Seroquel 50mg PO qhs and 25mg PO PRN q8h. On this regimen, patient remained agitated, with disorganized thought process and behavior, anxious, somatically preoccupied, paranoid and with poor sleep.  Neurology consult reported suspicion of frontotemporal dementia w/ possible comorbid psychiatric component. Patient did not appear to be able to care for herself and met criteria for involuntary inpatient psychiatric admission for safety and medication optimization. On admission to unit, patient remains anxious and confused.    # Neurocognitive Disorder - suspected to be 2/2 frontotemporal dementia w/ possible comorbid psychiatric features, stable  - MRI brain w/ and w/o contrast nondiagnostic d/t pts inability to complete exam  - f/u w/ pts son for past medication trial list given hx of EPS w/ past trials; multiple attempts have been made to contact son at provided phone number have been unsuccessful, will continue to try to contact son   - f/u [facility], RF, CRP, ESR, folate, syphilis screen; results pending  - C/w inpatient treatment     # Anxiety - stable  - c/w Seroquel 100 mg po BID  - c/w Zoloft 50 mg qAM  - c/w Ativan 0.5 mg BID w/ 0.5 mg PRN q6H for anxiety. Patient is a 63 y o  female, previously domiciled with  in [place], then relocated to [place] to be cared for by family after 's passing, with history of polysubstance abuse (son reports past rehab for Benzodiazepine use d/o and other substances he is unaware of), psychiatric history of anxiety and chart history of Bipolar Disorder (collateral does not support this dx), recently discharged from inpatient psychiatric unit in [place] for decompensation (with d/c dx of Acute Psychosis), with apparent f/u with psychiatrist in [place] prior to admission (unable to reach Dr. Marci Berkowitz [phone] & [phone]), was brought in by family due to inability to care for patient.  She was admitted to medical floor for placement to a higher level of care.  Psychiatry was initially consulted to evaluate for appropriateness for inpatient psychiatric unit given erratic and agitated behavior.  At that time, patient did not appear to meet criteria for involuntary admission and assessment was that her symptoms were primarily due to neurocognitive disorder. Psychiatry was reconsulted for worsening sleep and increasing agitation. At time of reevaluation, patient's medication regimen was changed from Prolixin 1mg PO BID and Trazodone 100mg PO qhs to Seroquel 50mg PO qhs and 25mg PO PRN q8h. On this regimen, patient remained agitated, with disorganized thought process and behavior, anxious, somatically preoccupied, paranoid and with poor sleep.  Neurology consult reported suspicion of frontotemporal dementia w/ possible comorbid psychiatric component. Patient did not appear to be able to care for herself and met criteria for involuntary inpatient psychiatric admission for safety and medication optimization. On admission to unit, patient remains anxious and confused.    # Neurocognitive Disorder - suspected to be 2/2 frontotemporal dementia w/ possible comorbid psychiatric features, stable  - MRI brain w/ and w/o contrast nondiagnostic d/t pts inability to complete exam  - Spoke to pt's son at phone number provided this afternoon, discussed w/ him that pt has shown no improvement w/ tx and that her condition is most likely 2/2 dementia-related process. Son reports that pt's perseverance on dying has been chronic and that her current state has been her baseline "for years". Son is agreeable to discharging pt to a long term care facility that is able to care for her. Son will contact social work to discuss placement  - f/u w/ pts son for past medication trial list given hx of EPS w/ past trials; son said that he will attempt to gather list  - f/u [facility], RF, CRP, ESR, folate, syphilis screen; results pending  - C/w inpatient treatment     # Anxiety - stable  - c/w Seroquel 100 mg po BID  - c/w Zoloft 50 mg qAM  - c/w Ativan 0.5 mg BID w/ 0.5 mg PRN q6H for anxiety.

## 2019-12-21 LAB
CRP SERPL-MCNC: 0.1 MG/DL — SIGNIFICANT CHANGE UP (ref 0–0.4)
FOLATE SERPL-MCNC: 18.9 NG/ML — SIGNIFICANT CHANGE UP
RHEUMATOID FACT SERPL-ACNC: 31 IU/ML — HIGH (ref 0–13)
T PALLIDUM AB TITR SER: NEGATIVE — SIGNIFICANT CHANGE UP

## 2019-12-21 RX ADMIN — QUETIAPINE FUMARATE 25 MILLIGRAM(S): 200 TABLET, FILM COATED ORAL at 03:56

## 2019-12-21 RX ADMIN — Medication 0.5 MILLIGRAM(S): at 08:35

## 2019-12-21 RX ADMIN — QUETIAPINE FUMARATE 25 MILLIGRAM(S): 200 TABLET, FILM COATED ORAL at 20:49

## 2019-12-21 RX ADMIN — Medication 1 PATCH: at 08:36

## 2019-12-21 RX ADMIN — Medication 0.5 MILLIGRAM(S): at 20:48

## 2019-12-21 RX ADMIN — QUETIAPINE FUMARATE 100 MILLIGRAM(S): 200 TABLET, FILM COATED ORAL at 20:49

## 2019-12-21 RX ADMIN — SERTRALINE 75 MILLIGRAM(S): 25 TABLET, FILM COATED ORAL at 08:36

## 2019-12-21 RX ADMIN — Medication 0.5 MILLIGRAM(S): at 03:56

## 2019-12-21 RX ADMIN — Medication 1 MILLIGRAM(S): at 20:49

## 2019-12-21 RX ADMIN — QUETIAPINE FUMARATE 100 MILLIGRAM(S): 200 TABLET, FILM COATED ORAL at 08:35

## 2019-12-22 RX ADMIN — QUETIAPINE FUMARATE 100 MILLIGRAM(S): 200 TABLET, FILM COATED ORAL at 20:31

## 2019-12-22 RX ADMIN — QUETIAPINE FUMARATE 100 MILLIGRAM(S): 200 TABLET, FILM COATED ORAL at 09:20

## 2019-12-22 RX ADMIN — Medication 1 MILLIGRAM(S): at 20:31

## 2019-12-22 RX ADMIN — Medication 0.5 MILLIGRAM(S): at 20:31

## 2019-12-22 RX ADMIN — SERTRALINE 75 MILLIGRAM(S): 25 TABLET, FILM COATED ORAL at 09:18

## 2019-12-22 RX ADMIN — QUETIAPINE FUMARATE 25 MILLIGRAM(S): 200 TABLET, FILM COATED ORAL at 20:31

## 2019-12-22 RX ADMIN — Medication 0.5 MILLIGRAM(S): at 09:20

## 2019-12-22 RX ADMIN — Medication 1 PATCH: at 09:18

## 2019-12-22 RX ADMIN — Medication 0.5 MILLIGRAM(S): at 13:49

## 2019-12-23 PROCEDURE — 99231 SBSQ HOSP IP/OBS SF/LOW 25: CPT

## 2019-12-23 RX ORDER — QUETIAPINE FUMARATE 200 MG/1
150 TABLET, FILM COATED ORAL AT BEDTIME
Refills: 0 | Status: DISCONTINUED | OUTPATIENT
Start: 2019-12-23 | End: 2019-12-31

## 2019-12-23 RX ORDER — QUETIAPINE FUMARATE 200 MG/1
50 TABLET, FILM COATED ORAL EVERY 6 HOURS
Refills: 0 | Status: DISCONTINUED | OUTPATIENT
Start: 2019-12-23 | End: 2020-01-23

## 2019-12-23 RX ORDER — QUETIAPINE FUMARATE 200 MG/1
100 TABLET, FILM COATED ORAL DAILY
Refills: 0 | Status: DISCONTINUED | OUTPATIENT
Start: 2019-12-24 | End: 2020-01-08

## 2019-12-23 RX ADMIN — Medication 0.5 MILLIGRAM(S): at 20:30

## 2019-12-23 RX ADMIN — Medication 1 PATCH: at 19:26

## 2019-12-23 RX ADMIN — QUETIAPINE FUMARATE 150 MILLIGRAM(S): 200 TABLET, FILM COATED ORAL at 20:28

## 2019-12-23 RX ADMIN — SERTRALINE 75 MILLIGRAM(S): 25 TABLET, FILM COATED ORAL at 08:56

## 2019-12-23 RX ADMIN — Medication 1 PATCH: at 08:56

## 2019-12-23 RX ADMIN — QUETIAPINE FUMARATE 100 MILLIGRAM(S): 200 TABLET, FILM COATED ORAL at 08:56

## 2019-12-23 RX ADMIN — Medication 1 MILLIGRAM(S): at 20:29

## 2019-12-23 RX ADMIN — Medication 0.5 MILLIGRAM(S): at 08:58

## 2019-12-23 NOTE — PROGRESS NOTE ADULT - SUBJECTIVE AND OBJECTIVE BOX
pt stable alert in NAD  no new complaints    PARANOID BEHAVIOR  ^PARANOID BEHAVIOR  No pertinent family history in first degree relatives  Handoff  Diabetes mellitus  Psychosis  Anxiety  Neurocognitive disorder  Psychosis, unspecified psychosis type  Type 2 diabetes mellitus without complication, without long-term current use of insulin  Diabetes mellitus  Psychosis  No significant past surgical history    HEALTH ISSUES - PROBLEM Dx:  Anxiety  Neurocognitive disorder  Psychosis, unspecified psychosis type: Psychosis, unspecified psychosis type  Type 2 diabetes mellitus without complication, without long-term current use of insulin: Type 2 diabetes mellitus without complication, without long-term current use of insulin  Diabetes mellitus: Diabetes mellitus  Psychosis: Psychosis        PAST MEDICAL & SURGICAL HISTORY:  Diabetes mellitus: -   - Diet controlled  Psychosis  No significant past surgical history    No Known Allergies      FAMILY HISTORY:  No pertinent family history in first degree relatives      folic acid 1 milliGRAM(s) Oral every 24 hours  LORazepam     Tablet 0.5 milliGRAM(s) Oral every 6 hours PRN  LORazepam     Tablet 0.5 milliGRAM(s) Oral every 12 hours  nicotine -  14 mG/24Hr(s) Patch 1 patch Transdermal daily  QUEtiapine 100 milliGRAM(s) Oral every 12 hours  QUEtiapine 25 milliGRAM(s) Oral every 6 hours PRN  sertraline 75 milliGRAM(s) Oral <User Schedule>      T(C): 36.1 (12-23-19 @ 08:28), Max: 36.9 (12-22-19 @ 15:36)  HR: 82 (12-23-19 @ 08:28) (82 - 86)  BP: 105/52 (12-23-19 @ 08:28) (105/52 - 112/59)  RR: 18 (12-23-19 @ 08:28) (16 - 18)  SpO2: --    PE;  general:  stablea in nad  no cahnges    Lungs:    Heart:    EXT:    Neuro:  no deficits                          CAPILLARY BLOOD GLUCOSE

## 2019-12-23 NOTE — PROGRESS NOTE BEHAVIORAL HEALTH - SUMMARY
Patient is a 63 y o  female, previously domiciled with  in [place], then relocated to [place] to be cared for by family after 's passing, with history of polysubstance abuse (son reports past rehab for Benzodiazepine use d/o and other substances he is unaware of), psychiatric history of anxiety and chart history of Bipolar Disorder (collateral does not support this dx), recently discharged from inpatient psychiatric unit in [place] for decompensation (with d/c dx of Acute Psychosis), with apparent f/u with psychiatrist in [place] prior to admission (unable to reach Dr. Marci Berkowitz [phone] & [phone]), was brought in by family due to inability to care for patient.  She was admitted to medical floor for placement to a higher level of care.  Psychiatry was initially consulted to evaluate for appropriateness for inpatient psychiatric unit given erratic and agitated behavior.  At that time, patient did not appear to meet criteria for involuntary admission and assessment was that her symptoms were primarily due to neurocognitive disorder. Psychiatry was reconsulted for worsening sleep and increasing agitation. At time of reevaluation, patient's medication regimen was changed from Prolixin 1mg PO BID and Trazodone 100mg PO qhs to Seroquel 50mg PO qhs and 25mg PO PRN q8h. On this regimen, patient remained agitated, with disorganized thought process and behavior, anxious, somatically preoccupied, paranoid and with poor sleep.  Neurology consult reported suspicion of frontotemporal dementia w/ possible comorbid psychiatric component. Patient did not appear to be able to care for herself and met criteria for involuntary inpatient psychiatric admission for safety and medication optimization. On admission to unit, patient remains anxious and confused.    # Neurocognitive Disorder - suspected to be 2/2 frontotemporal dementia w/ possible comorbid psychiatric features, stable  - MRI brain w/ and w/o contrast nondiagnostic d/t pts inability to complete exam  - Spoke to pt's son at phone number provided this afternoon, discussed w/ him that pt has shown no improvement w/ tx and that her condition is most likely 2/2 dementia-related process. Son reports that pt's perseverance on dying has been chronic and that her current state has been her baseline "for years". Son is agreeable to discharging pt to a long term care facility that is able to care for her. Son will contact social work to discuss placement  - f/u w/ pts son for past medication trial list given hx of EPS w/ past trials; son said that he will attempt to gather list  - f/u [facility], RF, CRP, ESR, folate, syphilis screen; results pending  - C/w inpatient treatment     # Anxiety - stable  - increase Seroquel to 100 mg qam, 150mg qhs from 100mg bid  - c/w Zoloft 50 mg qAM  - c/w Ativan 0.5 mg BID w/ 0.5 mg PRN q6H for anxiety.

## 2019-12-23 NOTE — PROGRESS NOTE BEHAVIORAL HEALTH - NSBHFUPINTERVALHXFT_PSY_A_CORE
Per staff, patient continues to have fixation on death and dying.  Patient seen and examined. She reports no issues over the weekend, but continues to ask "am I dying?" and "do I get life?". She endorses no difficulty sleeping over the weekend. Reports she saw her son on Saturday, and asked "is he coming today?". She denied any suicidal or homicidal ideation. She reports she would like to go back home, also repeatedly asking "where am I going?".

## 2019-12-24 LAB — ANA TITR SER: NEGATIVE — SIGNIFICANT CHANGE UP

## 2019-12-24 PROCEDURE — 99231 SBSQ HOSP IP/OBS SF/LOW 25: CPT

## 2019-12-24 RX ADMIN — Medication 1 PATCH: at 08:54

## 2019-12-24 RX ADMIN — QUETIAPINE FUMARATE 100 MILLIGRAM(S): 200 TABLET, FILM COATED ORAL at 08:51

## 2019-12-24 RX ADMIN — QUETIAPINE FUMARATE 150 MILLIGRAM(S): 200 TABLET, FILM COATED ORAL at 20:19

## 2019-12-24 RX ADMIN — Medication 1 PATCH: at 20:18

## 2019-12-24 RX ADMIN — Medication 1 PATCH: at 08:53

## 2019-12-24 RX ADMIN — Medication 0.5 MILLIGRAM(S): at 23:25

## 2019-12-24 RX ADMIN — SERTRALINE 75 MILLIGRAM(S): 25 TABLET, FILM COATED ORAL at 08:51

## 2019-12-24 RX ADMIN — Medication 0.5 MILLIGRAM(S): at 20:22

## 2019-12-24 RX ADMIN — Medication 1 MILLIGRAM(S): at 20:19

## 2019-12-24 RX ADMIN — Medication 0.5 MILLIGRAM(S): at 08:52

## 2019-12-24 NOTE — PROGRESS NOTE BEHAVIORAL HEALTH - NSBHFUPINTERVALHXFT_PSY_A_CORE
Pt has no acute complaints and no overnight events were reported. Pt continues to perseverate on dying, being on death row or having cancer. She greets the treatment team by saying "Am I dying?" and will continually ask "Am I dying?", "Do I have cancer?" and "Do I get death row?" throughout interactions. Even after she is assured that she is not dying, she will continue to ask similar questions. She does not appear significantly anxious or distraught when asking these questions. Pt was later observed in her room speaking to herself saying "Am I dying? I'm dying". Pt reports that she spoke w/ her son and that the conversation was "good". She does not endorse any difficult sleeping, SI/HI.

## 2019-12-24 NOTE — PROGRESS NOTE BEHAVIORAL HEALTH - SUMMARY
Patient is a 63 y o  female, previously domiciled with  in [place], then relocated to [place] to be cared for by family after 's passing, with history of polysubstance abuse (son reports past rehab for Benzodiazepine use d/o and other substances he is unaware of), psychiatric history of anxiety and chart history of Bipolar Disorder (collateral does not support this dx), recently discharged from inpatient psychiatric unit in [place] for decompensation (with d/c dx of Acute Psychosis), with apparent f/u with psychiatrist in [place] prior to admission (unable to reach Dr. Marci Berkowitz [phone]), was brought in by family due to inability to care for patient.  She was admitted to medical floor for placement to a higher level of care.  Psychiatry was initially consulted to evaluate for appropriateness for inpatient psychiatric unit given erratic and agitated behavior.  At that time, patient did not appear to meet criteria for involuntary admission and assessment was that her symptoms were primarily due to neurocognitive disorder. Psychiatry was reconsulted for worsening sleep and increasing agitation. At time of reevaluation, patient's medication regimen was changed from Prolixin 1mg PO BID and Trazodone 100mg PO qhs to Seroquel 50mg PO qhs and 25mg PO PRN q8h. On this regimen, patient remained agitated, with disorganized thought process and behavior, anxious, somatically preoccupied, paranoid and with poor sleep.  Neurology consult reported suspicion of frontotemporal dementia w/ possible comorbid psychiatric component. Patient did not appear to be able to care for herself and met criteria for involuntary inpatient psychiatric admission for safety and medication optimization. On admission to unit, patient remains anxious and confused.    # Neurocognitive Disorder - suspected to be 2/2 frontotemporal dementia w/ possible comorbid psychiatric features, stable  - MRI brain w/ and w/o contrast nondiagnostic d/t pts inability to complete exam  - Spoke to pt's son at phone number provided on Thurs Dec 19, discussed w/ him that pt has shown no improvement w/ tx and that her condition is most likely 2/2 dementia-related process. Son reports that pt's perseverance on dying has been chronic and that her current state has been her baseline "for years". Son is agreeable to discharging pt to a long term care facility that is able to care for her. Son will contact social work to discuss placement  - f/u w/ pts son for past medication trial list given hx of EPS w/ past trials; son said that he will attempt to gather list  - Rheum labs ordered - RF elevated (31). CRP and ESR WNL. Syphilis screen negative; f/u [facility], results pending  - Folate 18.9  - C/w inpatient treatment     # Anxiety - stable  - c/w Seroquel 100 mg qam, 150mg qhs   - c/w Zoloft 50 mg qAM  - c/w Ativan 0.5 mg BID w/ 0.5 mg PRN q6H for anxiety.

## 2019-12-25 RX ADMIN — Medication 0.5 MILLIGRAM(S): at 08:15

## 2019-12-25 RX ADMIN — Medication 1 PATCH: at 08:16

## 2019-12-25 RX ADMIN — Medication 1 PATCH: at 08:15

## 2019-12-25 RX ADMIN — Medication 1 MILLIGRAM(S): at 20:06

## 2019-12-25 RX ADMIN — SERTRALINE 75 MILLIGRAM(S): 25 TABLET, FILM COATED ORAL at 08:14

## 2019-12-25 RX ADMIN — QUETIAPINE FUMARATE 100 MILLIGRAM(S): 200 TABLET, FILM COATED ORAL at 08:15

## 2019-12-25 RX ADMIN — Medication 0.5 MILLIGRAM(S): at 20:07

## 2019-12-25 RX ADMIN — QUETIAPINE FUMARATE 150 MILLIGRAM(S): 200 TABLET, FILM COATED ORAL at 20:07

## 2019-12-26 PROCEDURE — 99231 SBSQ HOSP IP/OBS SF/LOW 25: CPT

## 2019-12-26 RX ADMIN — Medication 0.5 MILLIGRAM(S): at 20:17

## 2019-12-26 RX ADMIN — QUETIAPINE FUMARATE 150 MILLIGRAM(S): 200 TABLET, FILM COATED ORAL at 20:18

## 2019-12-26 RX ADMIN — SERTRALINE 75 MILLIGRAM(S): 25 TABLET, FILM COATED ORAL at 08:40

## 2019-12-26 RX ADMIN — Medication 1 PATCH: at 20:22

## 2019-12-26 RX ADMIN — Medication 1 PATCH: at 08:52

## 2019-12-26 RX ADMIN — QUETIAPINE FUMARATE 100 MILLIGRAM(S): 200 TABLET, FILM COATED ORAL at 08:45

## 2019-12-26 RX ADMIN — Medication 1 MILLIGRAM(S): at 20:17

## 2019-12-26 RX ADMIN — Medication 1 PATCH: at 08:48

## 2019-12-26 RX ADMIN — QUETIAPINE FUMARATE 50 MILLIGRAM(S): 200 TABLET, FILM COATED ORAL at 20:18

## 2019-12-26 RX ADMIN — Medication 0.5 MILLIGRAM(S): at 08:47

## 2019-12-26 NOTE — PROGRESS NOTE BEHAVIORAL HEALTH - NSBHFUPINTERVALHXFT_PSY_A_CORE
Per staff, patient received 0.5mg of ativan yesterday evening.  Patient seen and examined. She continues to repeatedly ask "Am I dying?" despite reassurance. She denies any issues overnight or yesterday. Reports she slept only a few hours last night, but unsure why, not aware of anything that kept her up. States that she did not eat breakfast, but per staff patient did partially eat breakfast. She denies any anxiety at present. Denies any suicidal or homicidal ideation.

## 2019-12-26 NOTE — PROGRESS NOTE BEHAVIORAL HEALTH - SUMMARY
Patient is a 63 y o  female, previously domiciled with  in [place], then relocated to [place] to be cared for by family after 's passing, with history of polysubstance abuse (son reports past rehab for Benzodiazepine use d/o and other substances he is unaware of), psychiatric history of anxiety and chart history of Bipolar Disorder (collateral does not support this dx), recently discharged from inpatient psychiatric unit in [place] for decompensation (with d/c dx of Acute Psychosis), with apparent f/u with psychiatrist in [place] prior to admission (unable to reach Dr. Marci Berkowitz [phone] & [phone]), was brought in by family due to inability to care for patient.  She was admitted to medical floor for placement to a higher level of care.  Psychiatry was initially consulted to evaluate for appropriateness for inpatient psychiatric unit given erratic and agitated behavior.  At that time, patient did not appear to meet criteria for involuntary admission and assessment was that her symptoms were primarily due to neurocognitive disorder. Psychiatry was reconsulted for worsening sleep and increasing agitation. At time of reevaluation, patient's medication regimen was changed from Prolixin 1mg PO BID and Trazodone 100mg PO qhs to Seroquel 50mg PO qhs and 25mg PO PRN q8h. On this regimen, patient remained agitated, with disorganized thought process and behavior, anxious, somatically preoccupied, paranoid and with poor sleep.  Neurology consult reported suspicion of frontotemporal dementia w/ possible comorbid psychiatric component. Patient did not appear to be able to care for herself and met criteria for involuntary inpatient psychiatric admission for safety and medication optimization. On admission to unit, patient remains anxious and confused.    # Neurocognitive Disorder - suspected to be 2/2 frontotemporal dementia w/ possible comorbid psychiatric features, stable  - MRI brain w/ and w/o contrast nondiagnostic d/t pts inability to complete exam  - Spoke to pt's son at phone number provided on Thurs Dec 19, discussed w/ him that pt has shown no improvement w/ tx and that her condition is most likely 2/2 dementia-related process. Son reports that pt's perseverance on dying has been chronic and that her current state has been her baseline "for years". Son is agreeable to discharging pt to a long term care facility that is able to care for her. Son will contact social work to discuss placement  - f/u w/ pts son for past medication trial list given hx of EPS w/ past trials; son said that he will attempt to gather list  - Rheum labs ordered - RF elevated (31). CRP and ESR WNL. Syphilis screen negative; f/u [facility], results pending  - Folate 18.9  - C/w inpatient treatment     # Anxiety - stable  - c/w Seroquel 100 mg qam, 150mg qhs   - c/w Zoloft 50 mg qAM  - c/w Ativan 0.5 mg BID w/ 0.5 mg PRN q6H for anxiety.

## 2019-12-26 NOTE — PROGRESS NOTE BEHAVIORAL HEALTH - SECONDARY DX1
Psychosis, unspecified psychosis type
Neurocognitive disorder
Anxiety
Psychosis, unspecified psychosis type
Anxiety
Psychosis, unspecified psychosis type
Anxiety

## 2019-12-27 PROCEDURE — 99231 SBSQ HOSP IP/OBS SF/LOW 25: CPT

## 2019-12-27 RX ADMIN — QUETIAPINE FUMARATE 150 MILLIGRAM(S): 200 TABLET, FILM COATED ORAL at 20:33

## 2019-12-27 RX ADMIN — Medication 1 PATCH: at 08:39

## 2019-12-27 RX ADMIN — Medication 1 PATCH: at 08:40

## 2019-12-27 RX ADMIN — Medication 0.5 MILLIGRAM(S): at 17:35

## 2019-12-27 RX ADMIN — QUETIAPINE FUMARATE 100 MILLIGRAM(S): 200 TABLET, FILM COATED ORAL at 08:39

## 2019-12-27 RX ADMIN — Medication 1 PATCH: at 19:09

## 2019-12-27 RX ADMIN — Medication 0.5 MILLIGRAM(S): at 20:33

## 2019-12-27 RX ADMIN — Medication 0.5 MILLIGRAM(S): at 08:40

## 2019-12-27 RX ADMIN — Medication 1 MILLIGRAM(S): at 20:33

## 2019-12-27 RX ADMIN — SERTRALINE 75 MILLIGRAM(S): 25 TABLET, FILM COATED ORAL at 08:39

## 2019-12-27 NOTE — PROGRESS NOTE BEHAVIORAL HEALTH - DETAILS
c/o stomach ache that began this morning
reports grogginess and accelerated HR with medications

## 2019-12-27 NOTE — PROGRESS NOTE BEHAVIORAL HEALTH - SUMMARY
Patient is a 63 y o  female, previously domiciled with  in [place], then relocated to [place] to be cared for by family after 's passing, with history of polysubstance abuse (son reports past rehab for Benzodiazepine use d/o and other substances he is unaware of), psychiatric history of anxiety and chart history of Bipolar Disorder (collateral does not support this dx), recently discharged from inpatient psychiatric unit in [place] for decompensation (with d/c dx of Acute Psychosis), with apparent f/u with psychiatrist in [place] prior to admission (unable to reach Dr. Marci Berkowitz [phone] & [phone]), was brought in by family due to inability to care for patient.  She was admitted to medical floor for placement to a higher level of care.  Psychiatry was initially consulted to evaluate for appropriateness for inpatient psychiatric unit given erratic and agitated behavior.  At that time, patient did not appear to meet criteria for involuntary admission and assessment was that her symptoms were primarily due to neurocognitive disorder. Psychiatry was reconsulted for worsening sleep and increasing agitation. At time of reevaluation, patient's medication regimen was changed from Prolixin 1mg PO BID and Trazodone 100mg PO qhs to Seroquel 50mg PO qhs and 25mg PO PRN q8h. On this regimen, patient remained agitated, with disorganized thought process and behavior, anxious, somatically preoccupied, paranoid and with poor sleep.  Neurology consult reported suspicion of frontotemporal dementia w/ possible comorbid psychiatric component. Patient did not appear to be able to care for herself and met criteria for involuntary inpatient psychiatric admission for safety and medication optimization. On admission to unit, patient remains anxious and confused.    # Neurocognitive Disorder - suspected to be 2/2 frontotemporal dementia w/ possible comorbid psychiatric features, stable  - MRI brain w/ and w/o contrast nondiagnostic d/t pts inability to complete exam  - Spoke to pt's son at phone number provided on Thurs Dec 19, discussed w/ him that pt has shown no improvement w/ tx and that her condition is most likely 2/2 dementia-related process. Son reports that pt's perseverance on dying has been chronic and that her current state has been her baseline "for years". Son is agreeable to discharging pt to a long term care facility that is able to care for her. Son will contact social work to discuss placement  - f/u w/ pts son for past medication trial list given hx of EPS w/ past trials; son said that he will attempt to gather list  - Rheum labs ordered - RF elevated (31). CRP and ESR WNL. Syphilis screen negative; f/u [facility], results pending  - Folate 18.9  - C/w inpatient treatment     # Anxiety - stable  - c/w Seroquel 100 mg qam, 150mg qhs   - c/w Zoloft 50 mg qAM  - c/w Ativan 0.5 mg BID w/ 0.5 mg PRN q6H for anxiety.

## 2019-12-27 NOTE — CHART NOTE - NSCHARTNOTEFT_GEN_A_CORE
The treatment team met with pt. to discuss treatment plan, medications and discharge plan.  Pt. minimally engaged with the treatment team, gave short answers and repeatedly stated, "I have a stomachache".  Pt. has also been preoccupied with dying.  No suicidal or homicidal ideation was reported.  She has remained isolated to her room, even with encouragement to engage in unit activities.      Writer spoke with pt,.'s son, Torey, to discuss discharge planning.  Pt will be referred to SNF's.  Torey was informed pt.'s address would need to be changed from a [place] address as she will need insurnace (Medicaid/Medicare) for the state she is living in.  A ALEXANDER will be sent to various facilities once it is complete.    Mental Status Exam:    Mood-  Anxious    Sleep-  Normal    Appetite- Normal    ADL's-  Fair    Thought process-  Perseverative/Preoccupations    Observation-  q 10 minutes    Pt. is not stable for discharge at this time.

## 2019-12-27 NOTE — PROGRESS NOTE ADULT - SUBJECTIVE AND OBJECTIVE BOX
pt stable alert in NAD  no new complaints    PARANOID BEHAVIOR  ^PARANOID BEHAVIOR  No pertinent family history in first degree relatives  Handoff  Diabetes mellitus  Psychosis  Anxiety  Neurocognitive disorder  Psychosis, unspecified psychosis type  Type 2 diabetes mellitus without complication, without long-term current use of insulin  Diabetes mellitus  Psychosis  No significant past surgical history    HEALTH ISSUES - PROBLEM Dx:  Anxiety  Neurocognitive disorder  Psychosis, unspecified psychosis type: Psychosis, unspecified psychosis type  Type 2 diabetes mellitus without complication, without long-term current use of insulin: Type 2 diabetes mellitus without complication, without long-term current use of insulin  Diabetes mellitus: Diabetes mellitus  Psychosis: Psychosis        PAST MEDICAL & SURGICAL HISTORY:  Diabetes mellitus: -   - Diet controlled  Psychosis  No significant past surgical history    No Known Allergies      FAMILY HISTORY:  No pertinent family history in first degree relatives      folic acid 1 milliGRAM(s) Oral every 24 hours  LORazepam     Tablet 0.5 milliGRAM(s) Oral every 6 hours PRN  LORazepam     Tablet 0.5 milliGRAM(s) Oral every 12 hours  nicotine -  14 mG/24Hr(s) Patch 1 patch Transdermal daily  QUEtiapine 100 milliGRAM(s) Oral daily  QUEtiapine 150 milliGRAM(s) Oral at bedtime  QUEtiapine 50 milliGRAM(s) Oral every 6 hours PRN  sertraline 75 milliGRAM(s) Oral <User Schedule>      T(C): 36.4 (12-27-19 @ 08:34), Max: 36.8 (12-26-19 @ 15:51)  HR: 78 (12-27-19 @ 08:34) (77 - 101)  BP: 107/65 (12-27-19 @ 08:34) (107/65 - 150/61)  RR: 18 (12-27-19 @ 08:34) (18 - 18)  SpO2: --    PE;  general: no cahnges noted in nad    Lungs:    Heart:    EXT:    Neuro:  alert nod eficits                          CAPILLARY BLOOD GLUCOSE

## 2019-12-27 NOTE — PROGRESS NOTE BEHAVIORAL HEALTH - NSBHFUPINTERVALHXFT_PSY_A_CORE
Pt laying in bed when interviewed. Pt remains in her room all morning despite encouragement. When asked why she hasn't left her room she says she "has a stomachache". Pt made intermittent contact and gave minimal answers. She noticeably did not ask "Am I dying?" this morning.

## 2019-12-28 RX ADMIN — QUETIAPINE FUMARATE 100 MILLIGRAM(S): 200 TABLET, FILM COATED ORAL at 08:26

## 2019-12-28 RX ADMIN — Medication 0.5 MILLIGRAM(S): at 20:08

## 2019-12-28 RX ADMIN — SERTRALINE 75 MILLIGRAM(S): 25 TABLET, FILM COATED ORAL at 08:26

## 2019-12-28 RX ADMIN — QUETIAPINE FUMARATE 150 MILLIGRAM(S): 200 TABLET, FILM COATED ORAL at 20:09

## 2019-12-28 RX ADMIN — Medication 1 PATCH: at 08:27

## 2019-12-28 RX ADMIN — Medication 0.5 MILLIGRAM(S): at 08:28

## 2019-12-28 RX ADMIN — Medication 1 MILLIGRAM(S): at 20:08

## 2019-12-29 RX ADMIN — Medication 1 MILLIGRAM(S): at 20:27

## 2019-12-29 RX ADMIN — Medication 0.5 MILLIGRAM(S): at 00:15

## 2019-12-29 RX ADMIN — Medication 0.5 MILLIGRAM(S): at 09:30

## 2019-12-29 RX ADMIN — SERTRALINE 75 MILLIGRAM(S): 25 TABLET, FILM COATED ORAL at 09:29

## 2019-12-29 RX ADMIN — Medication 1 PATCH: at 09:29

## 2019-12-29 RX ADMIN — QUETIAPINE FUMARATE 50 MILLIGRAM(S): 200 TABLET, FILM COATED ORAL at 22:14

## 2019-12-29 RX ADMIN — Medication 0.5 MILLIGRAM(S): at 21:13

## 2019-12-29 RX ADMIN — QUETIAPINE FUMARATE 100 MILLIGRAM(S): 200 TABLET, FILM COATED ORAL at 09:30

## 2019-12-29 RX ADMIN — Medication 0.5 MILLIGRAM(S): at 20:28

## 2019-12-29 RX ADMIN — QUETIAPINE FUMARATE 150 MILLIGRAM(S): 200 TABLET, FILM COATED ORAL at 20:27

## 2019-12-30 PROCEDURE — 99231 SBSQ HOSP IP/OBS SF/LOW 25: CPT

## 2019-12-30 PROCEDURE — 99222 1ST HOSP IP/OBS MODERATE 55: CPT

## 2019-12-30 RX ADMIN — Medication 0.5 MILLIGRAM(S): at 20:40

## 2019-12-30 RX ADMIN — Medication 1 PATCH: at 20:11

## 2019-12-30 RX ADMIN — Medication 0.5 MILLIGRAM(S): at 20:11

## 2019-12-30 RX ADMIN — SERTRALINE 75 MILLIGRAM(S): 25 TABLET, FILM COATED ORAL at 08:09

## 2019-12-30 RX ADMIN — Medication 1 MILLIGRAM(S): at 20:11

## 2019-12-30 RX ADMIN — Medication 0.5 MILLIGRAM(S): at 08:11

## 2019-12-30 RX ADMIN — QUETIAPINE FUMARATE 100 MILLIGRAM(S): 200 TABLET, FILM COATED ORAL at 08:09

## 2019-12-30 RX ADMIN — Medication 1 PATCH: at 08:09

## 2019-12-30 RX ADMIN — QUETIAPINE FUMARATE 150 MILLIGRAM(S): 200 TABLET, FILM COATED ORAL at 20:11

## 2019-12-30 NOTE — CONSULT NOTE ADULT - SUBJECTIVE AND OBJECTIVE BOX
S :   64y year old Female seen at bedside with a chief complaint of   painful thickened, dystrophic, ingrowing  and long toenails digits 1-5 bilaterally  and preventative foot examination. Patient is medically managed  by Medicine/Hospitalists.  Patient denies any history o f trauma to both feet.  Patient has no other pedal complaints.  Patient is experiencing pain while standing, walking and in shoe gear.       Patient admits to  (-) Fevers, (-) Chills, (-) Nausea, (-) Vomiting, (-) Shortness of Breath (-) calf pain (-) chest pain       PMH: Diabetes mellitus  Psychosis    PSH:No significant past surgical history      Allergies:No Known Allergies      Labs:      WBC Trend      Chem              T(F): 98.5 (12-30-19 @ 05:31), Max: 98.5 (12-30-19 @ 05:31)  HR: 69 (12-30-19 @ 05:31) (69 - 71)  BP: 91/52 (12-30-19 @ 05:31) (91/52 - 105/55)  RR: 17 (12-30-19 @ 05:31) (17 - 20)  SpO2: --  Wt(kg): --    O:   Integument:  Skin warm, dry and supple bilateral.  No open lesions or inter-digital macerations noted bilateral.   Toenails 1-5 Right and Left feet thickened, elongated, discolored, and dystrophic with subungual debris. There is pain upon palpation of all fungal and ingrowing nails 1-5 bilaterally.   Vascular: Dorsalis Pedis and Posterior Tibial pulses 2/4.  Capillary re-fill time less than 3 seconds digits 1-5 bilateral.   Neuro: Protective sensation intact to the level of the digits bilateral.  MSK: Muscle strength 5/5 all major muscle groups bilateral. No structural abnormality, bilaterally      A:   1. bilateral hypertrohic Onychomycosis digits 1-5   2. Pain from Elongated nails, ingrowing  and dystrophic nails    P: Discussed diagnosis and treatment with patient  Aseptic debridement and curretage  of all fungal and ingrowing  nails 1-5 bilateral with sterile nail pack  Discussed importance of daily foot examinations and proper shoe gear  Please re-consult as needed.

## 2019-12-30 NOTE — PROGRESS NOTE BEHAVIORAL HEALTH - SUMMARY
Patient is a 63 y o  female, previously domiciled with  in [place], then relocated to [place] to be cared for by family after 's passing, with history of polysubstance abuse (son reports past rehab for Benzodiazepine use d/o and other substances he is unaware of), psychiatric history of anxiety and chart history of Bipolar Disorder (collateral does not support this dx), recently discharged from inpatient psychiatric unit in [place] for decompensation (with d/c dx of Acute Psychosis), with apparent f/u with psychiatrist in [place] prior to admission (unable to reach Dr. Marci Berkowitz [phone]), was brought in by family due to inability to care for patient.  She was admitted to medical floor for placement to a higher level of care.  Psychiatry was initially consulted to evaluate for appropriateness for inpatient psychiatric unit given erratic and agitated behavior.  At that time, patient did not appear to meet criteria for involuntary admission and assessment was that her symptoms were primarily due to neurocognitive disorder. Psychiatry was reconsulted for worsening sleep and increasing agitation. At time of reevaluation, patient's medication regimen was changed from Prolixin 1mg PO BID and Trazodone 100mg PO qhs to Seroquel 50mg PO qhs and 25mg PO PRN q8h. On this regimen, patient remained agitated, with disorganized thought process and behavior, anxious, somatically preoccupied, paranoid and with poor sleep.  Neurology consult reported suspicion of frontotemporal dementia w/ possible comorbid psychiatric component. Patient did not appear to be able to care for herself and met criteria for involuntary inpatient psychiatric admission for safety and medication optimization. On admission to unit, patient remains anxious and confused. On evaluation, patient continues to appear confused and preoccupied with thoughts of death.     # Neurocognitive Disorder - suspected to be 2/2 frontotemporal dementia w/ possible comorbid psychiatric features, stable  - MRI brain w/ and w/o contrast nondiagnostic d/t pts inability to complete exam  - Spoke to pt's son at phone number provided on Thurs Dec 19, discussed w/ him that pt has shown no improvement w/ tx and that her condition is most likely 2/2 dementia-related process. Son reports that pt's perseverance on dying has been chronic and that her current state has been her baseline "for years". Son is agreeable to discharging pt to a long term care facility that is able to care for her. Son will contact social work to discuss placement  - f/u w/ pts son for past medication trial list given hx of EPS w/ past trials; son said that he will attempt to gather list  - Rheum labs ordered - RF elevated (31). CRP and ESR WNL. Syphilis screen negative; DENISE negative  - Folate 18.9  - C/w inpatient treatment    # Anxiety - stable  - c/w Seroquel 100 mg qam, 150mg qhs  - c/w Zoloft 50 mg qAM  - c/w Ativan 0.5 mg BID w/ 0.5 mg PRN q6H for anxiety.

## 2019-12-30 NOTE — PROGRESS NOTE BEHAVIORAL HEALTH - NSBHFUPINTERVALHXFT_PSY_A_CORE
Per staff, patient continues to ask staff whether she is dying.  Patient seen and examined at bedside, noted to be laying in bed. Patient asked "am I ok?". Reports that she slept and ate well, though that she still is feeling tired at the moment and would like to go back to sleep. She denies any issues overnight or over the weekend. Patient denies any suicidal or homicidal ideation.

## 2019-12-31 PROCEDURE — 99231 SBSQ HOSP IP/OBS SF/LOW 25: CPT

## 2019-12-31 RX ORDER — QUETIAPINE FUMARATE 200 MG/1
200 TABLET, FILM COATED ORAL AT BEDTIME
Refills: 0 | Status: DISCONTINUED | OUTPATIENT
Start: 2019-12-31 | End: 2020-01-02

## 2019-12-31 RX ADMIN — Medication 1 MILLIGRAM(S): at 20:12

## 2019-12-31 RX ADMIN — SERTRALINE 75 MILLIGRAM(S): 25 TABLET, FILM COATED ORAL at 08:36

## 2019-12-31 RX ADMIN — QUETIAPINE FUMARATE 100 MILLIGRAM(S): 200 TABLET, FILM COATED ORAL at 08:36

## 2019-12-31 RX ADMIN — QUETIAPINE FUMARATE 200 MILLIGRAM(S): 200 TABLET, FILM COATED ORAL at 20:14

## 2019-12-31 RX ADMIN — Medication 0.5 MILLIGRAM(S): at 20:13

## 2019-12-31 RX ADMIN — Medication 0.5 MILLIGRAM(S): at 08:36

## 2019-12-31 RX ADMIN — Medication 0.5 MILLIGRAM(S): at 17:09

## 2019-12-31 RX ADMIN — Medication 1 PATCH: at 08:37

## 2019-12-31 NOTE — PROGRESS NOTE BEHAVIORAL HEALTH - NSBHFUPINTERVALHXFT_PSY_A_CORE
Per Staff, patient continues to be anxious with difficulty sleeping and was given 0.5 Ativan at 20:40. When seen in the morning patient reported that she slept all night and did not report any anxiety of difficulty sleeping. Patient was unable to state where she is, and continues to ask if shes "okay" of if "she's going to die". Per Staff, patient continues to be anxious with difficulty sleeping and was given 0.5 Ativan at 20:40. When seen in the morning patient reported that she slept all night. Patient was unable to state where she is, and continues to ask if shes "okay", "when am I going home?", or if "she's going to die". Patient denies any issues overnight. Patient asked "Is this the hospital? Someone said this was the prison."

## 2019-12-31 NOTE — PROGRESS NOTE BEHAVIORAL HEALTH - SUMMARY
Patient is a 63 y o  female, previously domiciled with  in [place], then relocated to [place] to be cared for by family after 's passing, with history of polysubstance abuse (son reports past rehab for Benzodiazepine use d/o and other substances he is unaware of), psychiatric history of anxiety and chart history of Bipolar Disorder (collateral does not support this dx), recently discharged from inpatient psychiatric unit in [place] for decompensation (with d/c dx of Acute Psychosis), with apparent f/u with psychiatrist in [place] prior to admission (unable to reach Dr. Marci Berkowitz [phone] & [phone]), was brought in by family due to inability to care for patient.  She was admitted to medical floor for placement to a higher level of care.  Psychiatry was initially consulted to evaluate for appropriateness for inpatient psychiatric unit given erratic and agitated behavior.  At that time, patient did not appear to meet criteria for involuntary admission and assessment was that her symptoms were primarily due to neurocognitive disorder. Psychiatry was reconsulted for worsening sleep and increasing agitation. At time of reevaluation, patient's medication regimen was changed from Prolixin 1mg PO BID and Trazodone 100mg PO qhs to Seroquel 50mg PO qhs and 25mg PO PRN q8h. On this regimen, patient remained agitated, with disorganized thought process and behavior, anxious, somatically preoccupied, paranoid and with poor sleep.  Neurology consult reported suspicion of frontotemporal dementia w/ possible comorbid psychiatric component. Patient did not appear to be able to care for herself and met criteria for involuntary inpatient psychiatric admission for safety and medication optimization. On admission to unit, patient remains anxious and confused. On evaluation, patient continues to appear confused and preoccupied with thoughts of death.     # Neurocognitive Disorder - suspected to be 2/2 frontotemporal dementia w/ possible comorbid psychiatric features, stable  - MRI brain w/ and w/o contrast nondiagnostic d/t pts inability to complete exam  - Spoke to pt's son at phone number provided on Thurs Dec 19, discussed w/ him that pt has shown no improvement w/ tx and that her condition is most likely 2/2 dementia-related process. Son reports that pt's perseverance on dying has been chronic and that her current state has been her baseline "for years". Son is agreeable to discharging pt to a long term care facility that is able to care for her. Son will contact social work to discuss placement  - f/u w/ pts son for past medication trial list given hx of EPS w/ past trials; son said that he will attempt to gather list  - Rheum labs ordered - RF elevated (31). CRP and ESR WNL. Syphilis screen negative; DENISE negative  - Folate 18.9  - C/w inpatient treatment    # Anxiety - stable  - Increase Seroquel 100 mg qam, 200 mg qhs  - c/w Zoloft 50 mg qAM  - c/w Ativan 0.5 mg BID w/ 0.5 mg PRN q6H for anxiety.

## 2020-01-01 RX ADMIN — Medication 0.5 MILLIGRAM(S): at 08:29

## 2020-01-01 RX ADMIN — QUETIAPINE FUMARATE 200 MILLIGRAM(S): 200 TABLET, FILM COATED ORAL at 20:19

## 2020-01-01 RX ADMIN — Medication 1 PATCH: at 08:29

## 2020-01-01 RX ADMIN — SERTRALINE 75 MILLIGRAM(S): 25 TABLET, FILM COATED ORAL at 08:28

## 2020-01-01 RX ADMIN — Medication 1 MILLIGRAM(S): at 20:19

## 2020-01-01 RX ADMIN — Medication 0.5 MILLIGRAM(S): at 20:20

## 2020-01-01 RX ADMIN — QUETIAPINE FUMARATE 100 MILLIGRAM(S): 200 TABLET, FILM COATED ORAL at 08:27

## 2020-01-02 PROCEDURE — 99231 SBSQ HOSP IP/OBS SF/LOW 25: CPT

## 2020-01-02 RX ORDER — QUETIAPINE FUMARATE 200 MG/1
250 TABLET, FILM COATED ORAL AT BEDTIME
Refills: 0 | Status: DISCONTINUED | OUTPATIENT
Start: 2020-01-02 | End: 2020-01-07

## 2020-01-02 RX ADMIN — SERTRALINE 75 MILLIGRAM(S): 25 TABLET, FILM COATED ORAL at 08:12

## 2020-01-02 RX ADMIN — Medication 0.5 MILLIGRAM(S): at 08:11

## 2020-01-02 RX ADMIN — Medication 1 PATCH: at 08:12

## 2020-01-02 RX ADMIN — Medication 0.5 MILLIGRAM(S): at 20:13

## 2020-01-02 RX ADMIN — Medication 1 MILLIGRAM(S): at 20:12

## 2020-01-02 RX ADMIN — Medication 1 PATCH: at 18:00

## 2020-01-02 RX ADMIN — QUETIAPINE FUMARATE 100 MILLIGRAM(S): 200 TABLET, FILM COATED ORAL at 08:11

## 2020-01-02 RX ADMIN — Medication 1 PATCH: at 08:11

## 2020-01-02 RX ADMIN — QUETIAPINE FUMARATE 250 MILLIGRAM(S): 200 TABLET, FILM COATED ORAL at 20:12

## 2020-01-02 NOTE — PROGRESS NOTE BEHAVIORAL HEALTH - SUMMARY
Patient is a 63 y o  female, previously domiciled with  in [place], then relocated to [place] to be cared for by family after 's passing, with history of polysubstance abuse (son reports past rehab for Benzodiazepine use d/o and other substances he is unaware of), psychiatric history of anxiety and chart history of Bipolar Disorder (collateral does not support this dx), recently discharged from inpatient psychiatric unit in [place] for decompensation (with d/c dx of Acute Psychosis), with apparent f/u with psychiatrist in [place] prior to admission (unable to reach Dr. Marci Berkowitz [phone]), was brought in by family due to inability to care for patient.  She was admitted to medical floor for placement to a higher level of care.  Psychiatry was initially consulted to evaluate for appropriateness for inpatient psychiatric unit given erratic and agitated behavior.  At that time, patient did not appear to meet criteria for involuntary admission and assessment was that her symptoms were primarily due to neurocognitive disorder. Psychiatry was reconsulted for worsening sleep and increasing agitation. At time of reevaluation, patient's medication regimen was changed from Prolixin 1mg PO BID and Trazodone 100mg PO qhs to Seroquel 50mg PO qhs and 25mg PO PRN q8h. On this regimen, patient remained agitated, with disorganized thought process and behavior, anxious, somatically preoccupied, paranoid and with poor sleep.  Neurology consult reported suspicion of frontotemporal dementia w/ possible comorbid psychiatric component. Patient did not appear to be able to care for herself and met criteria for involuntary inpatient psychiatric admission for safety and medication optimization. On admission to unit, patient remains anxious and confused. On evaluation, patient continues to appear confused and preoccupied with thoughts of death.     # Neurocognitive Disorder - suspected to be 2/2 frontotemporal dementia w/ possible comorbid psychiatric features, stable  - MRI brain w/ and w/o contrast nondiagnostic d/t pt's inability to complete exam  - Spoke to pt's son at phone number provided on Thurs Dec 19, discussed w/ him that pt has shown no improvement w/ tx and that her condition is most likely 2/2 dementia-related process. Son reports that pt's perseverance on dying has been chronic and that her current state has been her baseline "for years". Son is agreeable to discharging pt to a long term care facility that is able to care for her. Son will contact social work to discuss placement  - f/u w/ pts son for past medication trial list given hx of EPS w/ past trials; son said that he will attempt to gather list  - Rheum labs ordered - RF elevated (31). CRP and ESR WNL. Syphilis screen negative; DENISE negative  - Folate 18.9  - C/w inpatient treatment    # Anxiety  - Increase Seroquel to 100 mg qam, 250 mg qhs  - c/w Zoloft 50 mg qAM  - c/w Ativan 0.5 mg BID w/ 0.5 mg PRN q6H for anxiety.

## 2020-01-02 NOTE — PROGRESS NOTE BEHAVIORAL HEALTH - NSBHFUPINTERVALHXFT_PSY_A_CORE
Per staff, patient remains restless but redirectable, no PRNs were given  Patient seen and examined. When asked how she was feeling, she asked "Am I high?". Patient then asked again if she was dying. She reports good sleep last night, initially stated she had been eating well but later stated that she did not eat breakfast. She then asked "am I going home?". When asked if she would be able to tolerate an MRI, she stated "not right now". Denies any complaints overnight or at present.

## 2020-01-03 PROCEDURE — 99231 SBSQ HOSP IP/OBS SF/LOW 25: CPT

## 2020-01-03 RX ADMIN — QUETIAPINE FUMARATE 250 MILLIGRAM(S): 200 TABLET, FILM COATED ORAL at 21:00

## 2020-01-03 RX ADMIN — Medication 0.5 MILLIGRAM(S): at 19:27

## 2020-01-03 RX ADMIN — Medication 0.5 MILLIGRAM(S): at 21:00

## 2020-01-03 RX ADMIN — SERTRALINE 75 MILLIGRAM(S): 25 TABLET, FILM COATED ORAL at 08:33

## 2020-01-03 RX ADMIN — Medication 1 PATCH: at 08:34

## 2020-01-03 RX ADMIN — Medication 1 PATCH: at 08:44

## 2020-01-03 RX ADMIN — Medication 1 PATCH: at 08:43

## 2020-01-03 RX ADMIN — Medication 0.5 MILLIGRAM(S): at 08:38

## 2020-01-03 RX ADMIN — Medication 1 MILLIGRAM(S): at 19:30

## 2020-01-03 RX ADMIN — QUETIAPINE FUMARATE 100 MILLIGRAM(S): 200 TABLET, FILM COATED ORAL at 08:37

## 2020-01-03 NOTE — PROGRESS NOTE BEHAVIORAL HEALTH - NSBHFUPINTERVALHXFT_PSY_A_CORE
Per staff, patient showered yesterday and had her clothes washed. Needs frequent redirection on the unit.  Patient seen and examined. She reports she does not recall eating breakfast and is hungry, tough per staff patient ate breakfast. She reports she slept, though later reported she did not sleep well last night and is feeling tired. She denies any other issues. Patient asked "Am I gas?", "do I get to live?" and "am I going home?".

## 2020-01-03 NOTE — CHART NOTE - NSCHARTNOTEFT_GEN_A_CORE
The treatment team met with pt. to discuss treatment plan, medications and discharge.  Pt. did attend today's treatment team meeting.  Pt. continues to present with delusions she is dying as well as other bizarre statements and beliefs.  She is not a behavioral issue and has been taking her medication as prescribed. The treatment team continues to offer support.    Pt. states she wants to go home.  She does not appear to realize she is undomiciled and states she lives in [place].  On this date, a ALEXANDER was sent to numerous [facility].  [facility] expressed interested.  They are asking for a Level 2 screening which has been requested on this date.  Writer did speak with pt's son and niece in regards to this issue.  Writer will continue to communicate and work with pt.'s family.    Mental Status Exam:    Mood-  Anxious/Euthymic    Sleep-  Normal    Appetite- Fair    Thought Process-  Perseverative/Impaired reasoning/Preoccupations    Observation-  q 10 minutes    Pt. is not ready for discharge at this time.

## 2020-01-03 NOTE — PROGRESS NOTE BEHAVIORAL HEALTH - SUMMARY
Patient is a 63 y o  female, previously domiciled with  in [place], then relocated to [place] to be cared for by family after 's passing, with history of polysubstance abuse (son reports past rehab for Benzodiazepine use d/o and other substances he is unaware of), psychiatric history of anxiety and chart history of Bipolar Disorder (collateral does not support this dx), recently discharged from inpatient psychiatric unit in [place] for decompensation (with d/c dx of Acute Psychosis), with apparent f/u with psychiatrist in [place] prior to admission (unable to reach Dr. Marci Berkowitz [phone] & [phone]), was brought in by family due to inability to care for patient.  She was admitted to medical floor for placement to a higher level of care.  Psychiatry was initially consulted to evaluate for appropriateness for inpatient psychiatric unit given erratic and agitated behavior.  At that time, patient did not appear to meet criteria for involuntary admission and assessment was that her symptoms were primarily due to neurocognitive disorder. Psychiatry was reconsulted for worsening sleep and increasing agitation. At time of reevaluation, patient's medication regimen was changed from Prolixin 1mg PO BID and Trazodone 100mg PO qhs to Seroquel 50mg PO qhs and 25mg PO PRN q8h. On this regimen, patient remained agitated, with disorganized thought process and behavior, anxious, somatically preoccupied, paranoid and with poor sleep.  Neurology consult reported suspicion of frontotemporal dementia w/ possible comorbid psychiatric component. Patient did not appear to be able to care for herself and met criteria for involuntary inpatient psychiatric admission for safety and medication optimization. On admission to unit, patient remains anxious and confused. On evaluation, patient continues to appear confused and preoccupied with thoughts of death.     # Neurocognitive Disorder - suspected to be 2/2 frontotemporal dementia w/ possible comorbid psychiatric features, stable  - MRI brain w/ and w/o contrast nondiagnostic d/t pt's inability to complete exam  - Spoke to pt's son at phone number provided on Thurs Dec 19, discussed w/ him that pt has shown no improvement w/ tx and that her condition is most likely 2/2 dementia-related process. Son reports that pt's perseverance on dying has been chronic and that her current state has been her baseline "for years". Son is agreeable to discharging pt to a long term care facility that is able to care for her. Son will contact social work to discuss placement  - f/u w/ pts son for past medication trial list given hx of EPS w/ past trials; son said that he will attempt to gather list  - Rheum labs ordered - RF elevated (31). CRP and ESR WNL. Syphilis screen negative; DENISE negative  - Folate 18.9  - C/w inpatient treatment    # Anxiety  - continue Seroquel 100 mg qam, 250 mg qhs  - c/w Zoloft 50 mg qAM  - c/w Ativan 0.5 mg BID w/ 0.5 mg PRN q6H for anxiety.

## 2020-01-04 RX ADMIN — QUETIAPINE FUMARATE 250 MILLIGRAM(S): 200 TABLET, FILM COATED ORAL at 20:11

## 2020-01-04 RX ADMIN — Medication 0.5 MILLIGRAM(S): at 08:54

## 2020-01-04 RX ADMIN — Medication 0.5 MILLIGRAM(S): at 20:11

## 2020-01-04 RX ADMIN — Medication 1 PATCH: at 20:11

## 2020-01-04 RX ADMIN — SERTRALINE 75 MILLIGRAM(S): 25 TABLET, FILM COATED ORAL at 08:54

## 2020-01-04 RX ADMIN — Medication 1 PATCH: at 08:54

## 2020-01-04 RX ADMIN — QUETIAPINE FUMARATE 100 MILLIGRAM(S): 200 TABLET, FILM COATED ORAL at 08:54

## 2020-01-04 RX ADMIN — Medication 1 MILLIGRAM(S): at 20:11

## 2020-01-04 NOTE — PROGRESS NOTE ADULT - SUBJECTIVE AND OBJECTIVE BOX
pt stable alert in NAD  no new complaints    PARANOID BEHAVIOR  ^PARANOID BEHAVIOR  No pertinent family history in first degree relatives  Handoff  Diabetes mellitus  Psychosis  Anxiety  Neurocognitive disorder  Psychosis, unspecified psychosis type  Type 2 diabetes mellitus without complication, without long-term current use of insulin  Diabetes mellitus  Psychosis  No significant past surgical history    HEALTH ISSUES - PROBLEM Dx:  Anxiety  Neurocognitive disorder  Psychosis, unspecified psychosis type: Psychosis, unspecified psychosis type  Type 2 diabetes mellitus without complication, without long-term current use of insulin: Type 2 diabetes mellitus without complication, without long-term current use of insulin  Diabetes mellitus: Diabetes mellitus  Psychosis: Psychosis        PAST MEDICAL & SURGICAL HISTORY:  Diabetes mellitus: -   - Diet controlled  Psychosis  No significant past surgical history    No Known Allergies      FAMILY HISTORY:  No pertinent family history in first degree relatives      folic acid 1 milliGRAM(s) Oral every 24 hours  LORazepam     Tablet 0.5 milliGRAM(s) Oral every 6 hours PRN  LORazepam     Tablet 0.5 milliGRAM(s) Oral every 12 hours  nicotine -  14 mG/24Hr(s) Patch 1 patch Transdermal daily  QUEtiapine 100 milliGRAM(s) Oral daily  QUEtiapine 50 milliGRAM(s) Oral every 6 hours PRN  QUEtiapine 250 milliGRAM(s) Oral at bedtime  sertraline 75 milliGRAM(s) Oral <User Schedule>      T(C): 36.7 (01-04-20 @ 08:27), Max: 36.7 (01-04-20 @ 08:27)  HR: 82 (01-04-20 @ 08:27) (67 - 84)  BP: 93/55 (01-04-20 @ 08:27) (93/55 - 100/59)  RR: 17 (01-04-20 @ 08:27) (17 - 18)  SpO2: --    PE;  general: stable no acuate cahnges in nad    Lungs:    Heart:    EXT:    Neuro: aelrt no deficits                          CAPILLARY BLOOD GLUCOSE

## 2020-01-05 RX ADMIN — QUETIAPINE FUMARATE 250 MILLIGRAM(S): 200 TABLET, FILM COATED ORAL at 20:05

## 2020-01-05 RX ADMIN — Medication 0.5 MILLIGRAM(S): at 20:05

## 2020-01-05 RX ADMIN — Medication 1 PATCH: at 08:09

## 2020-01-05 RX ADMIN — SERTRALINE 75 MILLIGRAM(S): 25 TABLET, FILM COATED ORAL at 08:09

## 2020-01-05 RX ADMIN — Medication 0.5 MILLIGRAM(S): at 08:11

## 2020-01-05 RX ADMIN — Medication 1 MILLIGRAM(S): at 20:05

## 2020-01-05 RX ADMIN — QUETIAPINE FUMARATE 100 MILLIGRAM(S): 200 TABLET, FILM COATED ORAL at 08:09

## 2020-01-05 RX ADMIN — Medication 1 PATCH: at 20:06

## 2020-01-06 PROCEDURE — 99231 SBSQ HOSP IP/OBS SF/LOW 25: CPT

## 2020-01-06 RX ADMIN — Medication 0.5 MILLIGRAM(S): at 20:18

## 2020-01-06 RX ADMIN — QUETIAPINE FUMARATE 250 MILLIGRAM(S): 200 TABLET, FILM COATED ORAL at 20:18

## 2020-01-06 RX ADMIN — Medication 0.5 MILLIGRAM(S): at 08:39

## 2020-01-06 RX ADMIN — Medication 1 MILLIGRAM(S): at 20:18

## 2020-01-06 RX ADMIN — SERTRALINE 75 MILLIGRAM(S): 25 TABLET, FILM COATED ORAL at 08:39

## 2020-01-06 RX ADMIN — QUETIAPINE FUMARATE 100 MILLIGRAM(S): 200 TABLET, FILM COATED ORAL at 08:39

## 2020-01-06 RX ADMIN — Medication 1 PATCH: at 10:19

## 2020-01-06 NOTE — PROGRESS NOTE BEHAVIORAL HEALTH - NS ED BHA REVIEW OF ED CHART AVAILABLE IMAGING REVIEWED
None available
Yes
Yes
None available
Yes
None available
None available

## 2020-01-06 NOTE — PROGRESS NOTE BEHAVIORAL HEALTH - CASE SUMMARY
as noted

## 2020-01-06 NOTE — PROGRESS NOTE BEHAVIORAL HEALTH - NS ED BHA REVIEW OF ED CHART AVAILABLE LABS REVIEWED
None available
Yes
None available
Yes
None available

## 2020-01-06 NOTE — PROGRESS NOTE BEHAVIORAL HEALTH - NSBHCHARTREVIEWVS_PSY_A_CORE FT
ICU Vital Signs Last 24 Hrs  T(C): 35.7 (23 Dec 2019 15:53), Max: 36.1 (23 Dec 2019 06:04)  T(F): 96.3 (23 Dec 2019 15:53), Max: 96.9 (23 Dec 2019 06:04)  HR: 104 (23 Dec 2019 15:53) (82 - 104)  BP: 121/69 (23 Dec 2019 15:53) (105/52 - 121/69)  RR: 20 (23 Dec 2019 15:53) (18 - 20)
Vital Signs Last 24 Hrs  T(C): 35.5 (02 Jan 2020 09:19), Max: 36.3 (01 Jan 2020 17:17)  T(F): 95.9 (02 Jan 2020 09:19), Max: 97.3 (01 Jan 2020 17:17)  HR: 71 (02 Jan 2020 05:13) (71 - 85)  BP: 87/51 (02 Jan 2020 09:19) (87/51 - 110/58)  RR: 16 (02 Jan 2020 09:19) (16 - 22)
Vital Signs Last 24 Hrs  T(C): 35.9 (17 Dec 2019 15:56), Max: 36.8 (17 Dec 2019 10:00)  T(F): 96.6 (17 Dec 2019 15:56), Max: 98.2 (17 Dec 2019 10:00)  HR: 77 (17 Dec 2019 15:56) (72 - 87)  BP: 100/71 (17 Dec 2019 15:56) (97/55 - 112/59)  RR: 18 (17 Dec 2019 15:56) (18 - 18)  SpO2: 98% (17 Dec 2019 09:57) (98% - 98%)
Vital Signs Last 24 Hrs  T(C): 36 (03 Jan 2020 08:46), Max: 36.4 (03 Jan 2020 05:10)  T(F): 96.8 (03 Jan 2020 08:46), Max: 97.6 (03 Jan 2020 05:10)  HR: 62 (03 Jan 2020 08:46) (62 - 104)  BP: 94/53 (03 Jan 2020 08:46) (94/53 - 99/59)  RR: 18 (03 Jan 2020 08:46) (16 - 18)
Vital Signs Last 24 Hrs  T(C): 36.2 (14 Dec 2019 08:35), Max: 37.5 (14 Dec 2019 05:35)  T(F): 97.2 (14 Dec 2019 08:35), Max: 99.5 (14 Dec 2019 05:35)  HR: 88 (14 Dec 2019 08:35) (88 - 91)  BP: 121/76 (14 Dec 2019 08:35) (105/72 - 121/76)  BP(mean): --  RR: 18 (14 Dec 2019 08:35) (18 - 18)  SpO2: --
Vital Signs Last 24 Hrs  T(C): 36.2 (16 Dec 2019 10:26), Max: 36.9 (16 Dec 2019 06:15)  T(F): 97.2 (16 Dec 2019 10:26), Max: 98.5 (16 Dec 2019 06:15)  HR: 101 (16 Dec 2019 10:26) (79 - 101)  BP: 107/71 (16 Dec 2019 10:26) (99/63 - 107/71)  RR: 18 (16 Dec 2019 10:26) (16 - 18)
Vital Signs Last 24 Hrs  T(C): 36.2 (26 Dec 2019 08:11), Max: 36.2 (26 Dec 2019 06:12)  T(F): 97.1 (26 Dec 2019 08:11), Max: 97.1 (26 Dec 2019 06:12)  HR: 74 (26 Dec 2019 08:11) (74 - 107)  BP: 112/71 (26 Dec 2019 08:11) (85/56 - 112/71)  RR: 18 (26 Dec 2019 08:11) (18 - 18)
Vital Signs Last 24 Hrs  T(C): 36.3 (06 Jan 2020 08:52), Max: 36.7 (05 Jan 2020 18:18)  T(F): 97.3 (06 Jan 2020 08:52), Max: 98.1 (05 Jan 2020 18:18)  HR: 71 (06 Jan 2020 08:52) (58 - 77)  BP: 92/53 (06 Jan 2020 08:52) (92/53 - 151/58)  RR: 16 (06 Jan 2020 08:52) (16 - 18)
Vital Signs Last 24 Hrs  T(C): 36.5 (19 Dec 2019 09:16), Max: 36.8 (19 Dec 2019 06:07)  T(F): 97.7 (19 Dec 2019 09:16), Max: 98.2 (19 Dec 2019 06:07)  HR: 90 (19 Dec 2019 09:16) (70 - 90)  BP: 129/71 (19 Dec 2019 09:16) (111/76 - 129/71)  BP(mean): --  RR: 18 (19 Dec 2019 09:16) (16 - 18)  SpO2: --
Vital Signs Last 24 Hrs  T(C): 36.9 (30 Dec 2019 05:31), Max: 36.9 (30 Dec 2019 05:31)  T(F): 98.5 (30 Dec 2019 05:31), Max: 98.5 (30 Dec 2019 05:31)  HR: 69 (30 Dec 2019 05:31) (69 - 71)  BP: 91/52 (30 Dec 2019 05:31) (91/52 - 105/55)  RR: 17 (30 Dec 2019 05:31) (17 - 20)
Vital Signs Last 24 Hrs  T(C): 36.4 (27 Dec 2019 08:34), Max: 36.8 (26 Dec 2019 15:51)  T(F): 97.6 (27 Dec 2019 08:34), Max: 98.2 (26 Dec 2019 15:51)  HR: 78 (27 Dec 2019 08:34) (77 - 101)  BP: 107/65 (27 Dec 2019 08:34) (107/65 - 150/61)  BP(mean): --  RR: 18 (27 Dec 2019 08:34) (18 - 18)  SpO2: --
ICU Vital Signs Last 24 Hrs  T(C): 35.5 (13 Dec 2019 08:59), Max: 36.6 (12 Dec 2019 14:21)  T(F): 95.9 (13 Dec 2019 08:59), Max: 97.8 (12 Dec 2019 14:21)  HR: 99 (13 Dec 2019 08:59) (86 - 101)  BP: 110/77 (13 Dec 2019 08:59) (94/51 - 127/72)  BP(mean): 94 (12 Dec 2019 14:21) (94 - 94)  RR: 18 (13 Dec 2019 08:59) (18 - 20)  SpO2: 100% (12 Dec 2019 14:21) (100% - 100%)
Vital Signs Last 24 Hrs  T(C): 36 (20 Dec 2019 09:43), Max: 36 (20 Dec 2019 06:06)  T(F): 96.8 (20 Dec 2019 09:43), Max: 96.8 (20 Dec 2019 06:06)  HR: 74 (20 Dec 2019 09:43) (74 - 74)  BP: 97/55 (20 Dec 2019 09:43) (97/55 - 111/62)  BP(mean): --  RR: 16 (20 Dec 2019 09:43) (16 - 18)  SpO2: --
Vital Signs Last 24 Hrs  T(C): 36.2 (31 Dec 2019 08:31), Max: 36.3 (31 Dec 2019 05:50)  T(F): 97.1 (31 Dec 2019 08:31), Max: 97.3 (31 Dec 2019 05:50)  HR: 64 (31 Dec 2019 08:31) (64 - 73)  BP: 108/59 (31 Dec 2019 08:31) (94/53 - 108/59)  RR: 16 (31 Dec 2019 08:31) (16 - 18)
ICU Vital Signs Last 24 Hrs  T(C): 36.7 (24 Dec 2019 08:37), Max: 36.7 (24 Dec 2019 08:37)  T(F): 98.1 (24 Dec 2019 08:37), Max: 98.1 (24 Dec 2019 08:37)  HR: 77 (24 Dec 2019 08:37) (77 - 104)  BP: 90/52 (24 Dec 2019 08:37) (90/52 - 121/69)  RR: 16 (24 Dec 2019 08:37) (16 - 20)

## 2020-01-06 NOTE — PROGRESS NOTE BEHAVIORAL HEALTH - RISK ASSESSMENT
Assessment of pt remains consistent with neurocognitive impairment, increasing her risk for unintentional self-harm. This is not fully mitigated by family support and compliance with treatment. Patient continues to require inpatient psychiatric hospitalization at this time.
Patient presentation appear consistent with neurocognitive impairment, increasing her risk for unintentional self-harm, not fully mitigated by her family support and compliance with treatment. Patient continues to require inpatient psychiatric hospitalization at this time.
Assessment of pt remains consistent with neurocognitive impairment, increasing her risk for unintentional self-harm. This is not fully mitigated by family support and compliance with treatment. Patient continues to require inpatient psychiatric hospitalization at this time and until adequate placement is determined.
Assessment of pt remains consistent with neurocognitive impairment, increasing her risk for unintentional self-harm. This is not fully mitigated by family support and compliance with treatment. Patient continues to require inpatient psychiatric hospitalization at this time.
Patient presentation appear consistent with neurocognitive impairment, increasing her risk for unintentional self-harm, not fully mitigated by her family support and compliance with treatment. Patient continues to require inpatient psychiatric hospitalization at this time.
Patient presentation appear consistent with neurocognitive impairment, increasing her risk for unintentional self-harm, not fully mitigated by her family support and compliance with treatment. Patient continues to require inpatient psychiatric hospitalization at this time.
Assessment of pt remains consistent with neurocognitive impairment, increasing her risk for unintentional self-harm. This is not fully mitigated by family support and compliance with treatment. Patient continues to require inpatient psychiatric hospitalization at this time.
Patient presentation appear consistent with neurocognitive impairment, increasing her risk for unintentional self-harm, not fully mitigated by her family support and compliance with treatment. Patient continues to require inpatient psychiatric hospitalization at this time.
Assessment of pt remains consistent with neurocognitive impairment, increasing her risk for unintentional self-harm. This is not fully mitigated by family support and compliance with treatment. Patient continues to require inpatient psychiatric hospitalization at this time.
Assessment of pt remains consistent with neurocognitive impairment, increasing her risk for unintentional self-harm. This is not fully mitigated by family support and compliance with treatment. Patient continues to require inpatient psychiatric hospitalization at this time.
Patient presentation appear consistent with neurocognitive impairment, increasing her risk for unintentional self-harm, not fully mitigated by her family support and compliance with treatment. Patient continues to require inpatient psychiatric hospitalization at this time.
Assessment of pt remains consistent with neurocognitive impairment, increasing her risk for unintentional self-harm. This is not fully mitigated by family support and compliance with treatment. Patient continues to require inpatient psychiatric hospitalization at this time and until adequate placement is determined.

## 2020-01-06 NOTE — PROGRESS NOTE BEHAVIORAL HEALTH - SUMMARY
Patient is a 63 y o  female, previously domiciled with  in [place], then relocated to [place] to be cared for by family after 's passing, with history of polysubstance abuse (son reports past rehab for Benzodiazepine use d/o and other substances he is unaware of), psychiatric history of anxiety and chart history of Bipolar Disorder (collateral does not support this dx), recently discharged from inpatient psychiatric unit in [place] for decompensation (with d/c dx of Acute Psychosis), with apparent f/u with psychiatrist in [place] prior to admission (unable to reach Dr. Marci Berkowitz [phone] & [phone]), was brought in by family due to inability to care for patient.  She was admitted to medical floor for placement to a higher level of care.  Psychiatry was initially consulted to evaluate for appropriateness for inpatient psychiatric unit given erratic and agitated behavior.  At that time, patient did not appear to meet criteria for involuntary admission and assessment was that her symptoms were primarily due to neurocognitive disorder. Psychiatry was reconsulted for worsening sleep and increasing agitation. At time of reevaluation, patient's medication regimen was changed from Prolixin 1mg PO BID and Trazodone 100mg PO qhs to Seroquel 50mg PO qhs and 25mg PO PRN q8h. On this regimen, patient remained agitated, with disorganized thought process and behavior, anxious, somatically preoccupied, paranoid and with poor sleep.  Neurology consult reported suspicion of frontotemporal dementia w/ possible comorbid psychiatric component. Patient did not appear to be able to care for herself and met criteria for involuntary inpatient psychiatric admission for safety and medication optimization. On admission to unit, patient remains anxious and confused. On evaluation, patient continues to appear confused and preoccupied with thoughts of death.     # Neurocognitive Disorder - suspected to be 2/2 frontotemporal dementia w/ possible comorbid psychiatric features, stable  - MRI brain w/ and w/o contrast nondiagnostic d/t pt's inability to complete exam  - Spoke to pt's son at phone number provided on Thurs Dec 19, discussed w/ him that pt has shown no improvement w/ tx and that her condition is most likely 2/2 dementia-related process. Son reports that pt's perseverance on dying has been chronic and that her current state has been her baseline "for years". Son is agreeable to discharging pt to a long term care facility that is able to care for her. Son will contact social work to discuss placement  - f/u w/ pts son for past medication trial list given hx of EPS w/ past trials; son said that he will attempt to gather list  - Rheum labs ordered - RF elevated (31). CRP and ESR WNL. Syphilis screen negative; DENISE negative  - Folate 18.9  - C/w inpatient treatment    # Anxiety  - continue Seroquel 100 mg qam, 250 mg qhs  - c/w Zoloft 50 mg qAM  - c/w Ativan 0.5 mg BID w/ 0.5 mg PRN q6H for anxiety.

## 2020-01-06 NOTE — PROGRESS NOTE ADULT - SUBJECTIVE AND OBJECTIVE BOX
pt stable alert in NAD  no new complaints    PARANOID BEHAVIOR  ^PARANOID BEHAVIOR  No pertinent family history in first degree relatives  Handoff  Diabetes mellitus  Psychosis  Anxiety  Neurocognitive disorder  Psychosis, unspecified psychosis type  Type 2 diabetes mellitus without complication, without long-term current use of insulin  Diabetes mellitus  Psychosis  No significant past surgical history    HEALTH ISSUES - PROBLEM Dx:  Anxiety  Neurocognitive disorder  Psychosis, unspecified psychosis type: Psychosis, unspecified psychosis type  Type 2 diabetes mellitus without complication, without long-term current use of insulin: Type 2 diabetes mellitus without complication, without long-term current use of insulin  Diabetes mellitus: Diabetes mellitus  Psychosis: Psychosis        PAST MEDICAL & SURGICAL HISTORY:  Diabetes mellitus: -   - Diet controlled  Psychosis  No significant past surgical history    No Known Allergies      FAMILY HISTORY:  No pertinent family history in first degree relatives      folic acid 1 milliGRAM(s) Oral every 24 hours  LORazepam     Tablet 0.5 milliGRAM(s) Oral every 12 hours  LORazepam     Tablet 0.5 milliGRAM(s) Oral every 6 hours PRN  nicotine -  14 mG/24Hr(s) Patch 1 patch Transdermal daily  QUEtiapine 100 milliGRAM(s) Oral daily  QUEtiapine 50 milliGRAM(s) Oral every 6 hours PRN  QUEtiapine 250 milliGRAM(s) Oral at bedtime  sertraline 75 milliGRAM(s) Oral <User Schedule>      T(C): 35.6 (01-06-20 @ 05:47), Max: 36.7 (01-05-20 @ 18:18)  HR: 58 (01-06-20 @ 05:47) (58 - 77)  BP: 151/58 (01-06-20 @ 05:47) (127/67 - 151/58)  RR: 17 (01-06-20 @ 05:47) (17 - 18)  SpO2: --    PE;  general:  no acute cahngeas in nad    Lungs:    Heart:    EXT:    Neuro:  no deficits                          CAPILLARY BLOOD GLUCOSE

## 2020-01-06 NOTE — PROGRESS NOTE BEHAVIORAL HEALTH - NS ED BHA REVIEW OF ED CHART AVAILABLE INVESTIGATIONS REVIEWED
None available
Yes
None available
None available

## 2020-01-06 NOTE — PROGRESS NOTE BEHAVIORAL HEALTH - PRN MEDS
received 0.5mg of ativan yesterday evening
received ativan 0.5mg friday, saturday, and sunday
received ativan and seroquel last night
received ativan and seroquel over weekend for restlessness and insomnia
Seroquel 50 mg q6H PRN for psychosis, 20:18 12/26/19
Patient was on 0.5 Ativan

## 2020-01-06 NOTE — PROGRESS NOTE BEHAVIORAL HEALTH - MODIFICATIONS
seen/discussed with resident.  Will continue attempts to control sx; obtain MRI , and pursue placement
Seen/discussed with resident.  Pt repeats "am I dying " over and over.  Will attempt MRI and continue placement efforts
seen/discussed with resident
seen/discussed with resident.  Continue med adjustments and to pursue appropriate disposition
seen/discussed with resident.  Continue tx plan. ? disposition
seen/discussed with resident.  Will continue treatment plan. ??Disposition
seen/discussed with resident. pt off 1:1 without incident.  No s/h ideation. Will continue meds and to explore disposition options
seen/discussed with resident.  Continuing meds and treatment plan, and to pursue appropriate disposition
seen/discussed with resident.  No major change in status.   Will continue tx and attempt to pursue appropriate disposition
seen/discussed with resident.  Pt remains disorganized. Will continue meds and to attempt to find placement.
seen/discussed with student and individually.  no s/h ideation; perseveration persists will f/u labs, adjust meds and attempt again to get MRI
seen/discussed with resident.  Pt exhibits thought blocking; blunted affect. No overt delusions.  No s/h ideation. Will continue care; obtain MRI as CT scan was suspicious; r/o Fronto temporal dementia

## 2020-01-06 NOTE — PROGRESS NOTE BEHAVIORAL HEALTH - NSBHFUPINTERVALHXFT_PSY_A_CORE
Per staff, patient received ativan over the weekend, gets restless, needs redirection on the unit frequently.  Patient seen and examined at bedside. She reports good sleep and no issues over the weekend. She reports she saw her son over the weekend. She reports her hand was "burning the other day" but reports it is no longer the case and denies any current complaints.

## 2020-01-07 PROCEDURE — 99231 SBSQ HOSP IP/OBS SF/LOW 25: CPT

## 2020-01-07 RX ORDER — QUETIAPINE FUMARATE 200 MG/1
300 TABLET, FILM COATED ORAL AT BEDTIME
Refills: 0 | Status: DISCONTINUED | OUTPATIENT
Start: 2020-01-07 | End: 2020-01-23

## 2020-01-07 RX ADMIN — Medication 1 MILLIGRAM(S): at 20:38

## 2020-01-07 RX ADMIN — QUETIAPINE FUMARATE 300 MILLIGRAM(S): 200 TABLET, FILM COATED ORAL at 20:39

## 2020-01-07 RX ADMIN — Medication 0.5 MILLIGRAM(S): at 20:41

## 2020-01-07 RX ADMIN — Medication 0.5 MILLIGRAM(S): at 08:35

## 2020-01-07 RX ADMIN — SERTRALINE 75 MILLIGRAM(S): 25 TABLET, FILM COATED ORAL at 08:36

## 2020-01-07 RX ADMIN — QUETIAPINE FUMARATE 100 MILLIGRAM(S): 200 TABLET, FILM COATED ORAL at 08:36

## 2020-01-07 NOTE — PROGRESS NOTE BEHAVIORAL HEALTH - NSBHFUPINTERVALCCFT_PSY_A_CORE
Pt is less intrusive and exhibits less perseveration of speech.  No med side effects.  No behavior issues.  Ongoing placement efforts

## 2020-01-08 DIAGNOSIS — F29 UNSPECIFIED PSYCHOSIS NOT DUE TO A SUBSTANCE OR KNOWN PHYSIOLOGICAL CONDITION: ICD-10-CM

## 2020-01-08 PROCEDURE — 99231 SBSQ HOSP IP/OBS SF/LOW 25: CPT

## 2020-01-08 RX ADMIN — SERTRALINE 75 MILLIGRAM(S): 25 TABLET, FILM COATED ORAL at 08:40

## 2020-01-08 RX ADMIN — Medication 0.5 MILLIGRAM(S): at 18:53

## 2020-01-08 RX ADMIN — Medication 1 MILLIGRAM(S): at 20:16

## 2020-01-08 RX ADMIN — QUETIAPINE FUMARATE 300 MILLIGRAM(S): 200 TABLET, FILM COATED ORAL at 20:16

## 2020-01-08 NOTE — CHART NOTE - NSCHARTNOTEFT_GEN_A_CORE
Social Work Note:    Voicemail received from Anusha [phone] of [facility] regarding Level II referral.  Request made for psychiatrist consult and psychiatrist progress notes to accompany referral sent on 1/3.  These documents were faxed to [phone].      Will contact [facility] to determine date for patient to be screened for approval of SNF placement.

## 2020-01-08 NOTE — PROGRESS NOTE BEHAVIORAL HEALTH - NSBHFUPINTERVALCCFT_PSY_A_CORE
Pt is isolative; less intrusive and less repetitive statements and less disruptive behavior.  Ongoing disposition efforts.  No s/h ideation

## 2020-01-09 DIAGNOSIS — F29 UNSPECIFIED PSYCHOSIS NOT DUE TO A SUBSTANCE OR KNOWN PHYSIOLOGICAL CONDITION: ICD-10-CM

## 2020-01-09 PROCEDURE — 99231 SBSQ HOSP IP/OBS SF/LOW 25: CPT

## 2020-01-09 RX ADMIN — Medication 1 PATCH: at 20:45

## 2020-01-09 RX ADMIN — Medication 1 MILLIGRAM(S): at 20:50

## 2020-01-09 RX ADMIN — Medication 1 MILLIGRAM(S): at 12:43

## 2020-01-09 RX ADMIN — Medication 1 PATCH: at 10:43

## 2020-01-09 RX ADMIN — QUETIAPINE FUMARATE 300 MILLIGRAM(S): 200 TABLET, FILM COATED ORAL at 20:48

## 2020-01-09 RX ADMIN — Medication 0.5 MILLIGRAM(S): at 20:48

## 2020-01-09 RX ADMIN — SERTRALINE 75 MILLIGRAM(S): 25 TABLET, FILM COATED ORAL at 10:42

## 2020-01-09 NOTE — PROGRESS NOTE ADULT - SUBJECTIVE AND OBJECTIVE BOX
pt stable alert in NAD  no new complaints    PARANOID BEHAVIOR  ^PARANOID BEHAVIOR  No pertinent family history in first degree relatives  Handoff  Diabetes mellitus  Psychosis  Psychosis, unspecified psychosis type  Anxiety  Neurocognitive disorder  Psychosis, unspecified psychosis type  Type 2 diabetes mellitus without complication, without long-term current use of insulin  Diabetes mellitus  Psychosis  No significant past surgical history    HEALTH ISSUES - PROBLEM Dx:  Psychosis, unspecified psychosis type  Anxiety  Neurocognitive disorder  Psychosis, unspecified psychosis type: Psychosis, unspecified psychosis type  Type 2 diabetes mellitus without complication, without long-term current use of insulin: Type 2 diabetes mellitus without complication, without long-term current use of insulin  Diabetes mellitus: Diabetes mellitus  Psychosis: Psychosis        PAST MEDICAL & SURGICAL HISTORY:  Diabetes mellitus: -   - Diet controlled  Psychosis  No significant past surgical history    No Known Allergies      FAMILY HISTORY:  No pertinent family history in first degree relatives      folic acid 1 milliGRAM(s) Oral every 24 hours  LORazepam     Tablet 0.5 milliGRAM(s) Oral every 6 hours PRN  nicotine -  14 mG/24Hr(s) Patch 1 patch Transdermal daily  QUEtiapine 300 milliGRAM(s) Oral at bedtime  QUEtiapine 50 milliGRAM(s) Oral every 6 hours PRN  sertraline 75 milliGRAM(s) Oral <User Schedule>      T(C): 35.6 (01-09-20 @ 05:43), Max: 36.7 (01-08-20 @ 18:24)  HR: 59 (01-09-20 @ 05:43) (55 - 78)  BP: 127/56 (01-09-20 @ 05:43) (91/59 - 127/56)  RR: 18 (01-09-20 @ 05:43) (16 - 18)  SpO2: --    PE;  general:  no cahnges noted innad    Lungs:    Heart:    EXT:    Neuro:  no deficits                          CAPILLARY BLOOD GLUCOSE

## 2020-01-09 NOTE — PROGRESS NOTE ADULT - PROBLEM SELECTOR PLAN 1
continue present treatment as per psych plan as reviewed  Medically stable with no new changes in treatment  will continue to monitor medical status while being treated on psych  cotn tx for daibetes with diet on;y

## 2020-01-09 NOTE — PROGRESS NOTE BEHAVIORAL HEALTH - NSBHFUPINTERVALCCFT_PSY_A_CORE
Pt is at times quiet and pleasant on approach, while at others she is extremely loud and disorganized, yelling "I'm dying... am I dying?...I want to watch tv".  No insight. PO intake limited to pudding as per her choice.  She required IM ativan earlier for intense anxiety.

## 2020-01-10 DIAGNOSIS — F28 OTHER PSYCHOTIC DISORDER NOT DUE TO A SUBSTANCE OR KNOWN PHYSIOLOGICAL CONDITION: ICD-10-CM

## 2020-01-10 PROCEDURE — 99231 SBSQ HOSP IP/OBS SF/LOW 25: CPT

## 2020-01-10 RX ORDER — QUETIAPINE FUMARATE 200 MG/1
50 TABLET, FILM COATED ORAL
Refills: 0 | Status: DISCONTINUED | OUTPATIENT
Start: 2020-01-11 | End: 2020-01-13

## 2020-01-10 RX ADMIN — Medication 1 MILLIGRAM(S): at 20:12

## 2020-01-10 RX ADMIN — SERTRALINE 75 MILLIGRAM(S): 25 TABLET, FILM COATED ORAL at 08:16

## 2020-01-10 RX ADMIN — Medication 1 PATCH: at 07:24

## 2020-01-10 RX ADMIN — Medication 1 PATCH: at 08:19

## 2020-01-10 RX ADMIN — Medication 0.5 MILLIGRAM(S): at 14:56

## 2020-01-10 RX ADMIN — QUETIAPINE FUMARATE 300 MILLIGRAM(S): 200 TABLET, FILM COATED ORAL at 20:12

## 2020-01-10 NOTE — PROGRESS NOTE BEHAVIORAL HEALTH - NSBHFUPINTERVALCCFT_PSY_A_CORE
Pt is calmer today; less irritable and disruptive.  Repeatedly asks "am I dying", and then walks away.  No s/h ideation

## 2020-01-10 NOTE — CHART NOTE - NSCHARTNOTEFT_GEN_A_CORE
The treatment team met with pt. to discuss treatment plan, medications and discharge plan.  Pt. continues to present as paranoid with fixed delusions.  She continues to present as loud and disorganized, frequently asking if she is dying.  The treatment team continues to provide pt. with reassurance and support.    The treatment team met with pt. to discuss discharge planning.  Pt. was informed she is being referred to a SNF.  Pt. agreed with this referral.  [facility] in [place] has expressed interest in pt.  Documentation for a Level 2 screening has been sent to [facility] and was being processed as of 1/9/20.  [facility] is requiring a Level 2 screening for pt. to be considered for admission.  Pt.'s family has been made aware.    Mental Status Exam:    Mood-  Euthymic    Sleep-  Normal    Appetite-  Normal    ADL's-  Fair    Thought Process-  Preoccupations/Impaired reasoning    Observation-  q 10 minutes    Pt is not stable for discharge at this time.

## 2020-01-11 RX ADMIN — Medication 0.5 MILLIGRAM(S): at 01:56

## 2020-01-11 RX ADMIN — SERTRALINE 75 MILLIGRAM(S): 25 TABLET, FILM COATED ORAL at 09:44

## 2020-01-11 RX ADMIN — QUETIAPINE FUMARATE 300 MILLIGRAM(S): 200 TABLET, FILM COATED ORAL at 20:46

## 2020-01-11 RX ADMIN — Medication 1 PATCH: at 19:30

## 2020-01-11 RX ADMIN — Medication 1 PATCH: at 09:48

## 2020-01-11 RX ADMIN — QUETIAPINE FUMARATE 50 MILLIGRAM(S): 200 TABLET, FILM COATED ORAL at 09:45

## 2020-01-11 RX ADMIN — Medication 0.5 MILLIGRAM(S): at 23:39

## 2020-01-11 RX ADMIN — Medication 1 MILLIGRAM(S): at 20:46

## 2020-01-12 RX ORDER — HYDROXYZINE HCL 10 MG
100 TABLET ORAL ONCE
Refills: 0 | Status: COMPLETED | OUTPATIENT
Start: 2020-01-12 | End: 2020-01-12

## 2020-01-12 RX ORDER — RISPERIDONE 4 MG/1
2 TABLET ORAL ONCE
Refills: 0 | Status: COMPLETED | OUTPATIENT
Start: 2020-01-12 | End: 2020-01-12

## 2020-01-12 RX ADMIN — Medication 1 PATCH: at 20:37

## 2020-01-12 RX ADMIN — QUETIAPINE FUMARATE 300 MILLIGRAM(S): 200 TABLET, FILM COATED ORAL at 20:31

## 2020-01-12 RX ADMIN — Medication 1 PATCH: at 10:16

## 2020-01-12 RX ADMIN — RISPERIDONE 2 MILLIGRAM(S): 4 TABLET ORAL at 22:53

## 2020-01-12 RX ADMIN — Medication 0.5 MILLIGRAM(S): at 21:00

## 2020-01-12 RX ADMIN — Medication 1 MILLIGRAM(S): at 20:31

## 2020-01-12 RX ADMIN — QUETIAPINE FUMARATE 50 MILLIGRAM(S): 200 TABLET, FILM COATED ORAL at 10:16

## 2020-01-12 RX ADMIN — Medication 0.5 MILLIGRAM(S): at 14:55

## 2020-01-12 RX ADMIN — Medication 100 MILLIGRAM(S): at 22:53

## 2020-01-12 RX ADMIN — SERTRALINE 75 MILLIGRAM(S): 25 TABLET, FILM COATED ORAL at 10:15

## 2020-01-13 PROCEDURE — 99231 SBSQ HOSP IP/OBS SF/LOW 25: CPT

## 2020-01-13 RX ORDER — HYDROXYZINE HCL 10 MG
50 TABLET ORAL ONCE
Refills: 0 | Status: COMPLETED | OUTPATIENT
Start: 2020-01-13 | End: 2020-01-13

## 2020-01-13 RX ORDER — QUETIAPINE FUMARATE 200 MG/1
75 TABLET, FILM COATED ORAL
Refills: 0 | Status: DISCONTINUED | OUTPATIENT
Start: 2020-01-14 | End: 2020-01-14

## 2020-01-13 RX ADMIN — Medication 1 PATCH: at 08:05

## 2020-01-13 RX ADMIN — SERTRALINE 75 MILLIGRAM(S): 25 TABLET, FILM COATED ORAL at 08:05

## 2020-01-13 RX ADMIN — Medication 50 MILLIGRAM(S): at 20:13

## 2020-01-13 RX ADMIN — Medication 1 PATCH: at 07:17

## 2020-01-13 RX ADMIN — QUETIAPINE FUMARATE 50 MILLIGRAM(S): 200 TABLET, FILM COATED ORAL at 08:05

## 2020-01-13 RX ADMIN — QUETIAPINE FUMARATE 300 MILLIGRAM(S): 200 TABLET, FILM COATED ORAL at 20:13

## 2020-01-13 NOTE — PROGRESS NOTE ADULT - SUBJECTIVE AND OBJECTIVE BOX
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  Psychosis, unspecified psychosis type  Psychosis, unspecified psychosis type  Anxiety  Neurocognitive disorder  Psychosis, unspecified psychosis type: Psychosis, unspecified psychosis type  Type 2 diabetes mellitus without complication, without long-term current use of insulin: Type 2 diabetes mellitus without complication, without long-term current use of insulin  Diabetes mellitus: Diabetes mellitus  Psychosis: Psychosis        PAST MEDICAL & SURGICAL HISTORY:  Diabetes mellitus: -   - Diet controlled  Psychosis  No significant past surgical history    No Known Allergies      FAMILY HISTORY:  No pertinent family history in first degree relatives      folic acid 1 milliGRAM(s) Oral every 24 hours  nicotine -  14 mG/24Hr(s) Patch 1 patch Transdermal daily  QUEtiapine 300 milliGRAM(s) Oral at bedtime  QUEtiapine 50 milliGRAM(s) Oral every 6 hours PRN  QUEtiapine 50 milliGRAM(s) Oral <User Schedule>  sertraline 75 milliGRAM(s) Oral <User Schedule>      T(C): 35.8 (01-13-20 @ 08:08), Max: 36.4 (01-12-20 @ 08:48)  HR: 75 (01-13-20 @ 08:08) (75 - 88)  BP: 108/65 (01-13-20 @ 08:08) (92/58 - 108/65)  RR: 17 (01-13-20 @ 08:08) (17 - 18)  SpO2: --    PE;  general: no acuate changes in nad    Lungs:    Heart:    EXT:    Neuro: no acute cahgnes stable alert                          CAPILLARY BLOOD GLUCOSE

## 2020-01-14 DIAGNOSIS — F29 UNSPECIFIED PSYCHOSIS NOT DUE TO A SUBSTANCE OR KNOWN PHYSIOLOGICAL CONDITION: ICD-10-CM

## 2020-01-14 PROCEDURE — 99231 SBSQ HOSP IP/OBS SF/LOW 25: CPT

## 2020-01-14 RX ORDER — QUETIAPINE FUMARATE 200 MG/1
100 TABLET, FILM COATED ORAL
Refills: 0 | Status: DISCONTINUED | OUTPATIENT
Start: 2020-01-15 | End: 2020-01-23

## 2020-01-14 RX ADMIN — QUETIAPINE FUMARATE 50 MILLIGRAM(S): 200 TABLET, FILM COATED ORAL at 08:54

## 2020-01-14 RX ADMIN — QUETIAPINE FUMARATE 75 MILLIGRAM(S): 200 TABLET, FILM COATED ORAL at 07:37

## 2020-01-14 RX ADMIN — SERTRALINE 75 MILLIGRAM(S): 25 TABLET, FILM COATED ORAL at 07:37

## 2020-01-14 RX ADMIN — Medication 1 MILLIGRAM(S): at 20:15

## 2020-01-14 RX ADMIN — QUETIAPINE FUMARATE 300 MILLIGRAM(S): 200 TABLET, FILM COATED ORAL at 20:15

## 2020-01-14 RX ADMIN — Medication 2 MILLIGRAM(S): at 09:46

## 2020-01-14 NOTE — CHART NOTE - NSCHARTNOTEFT_GEN_A_CORE
The treatment team met with pt. to discuss treatment plan, medications and discharge plan.  The treatment team was unable to engage with pt. as she is has been highly agitated and irritable all morning.  She was placed on 1:1 observation due to her change in behavior.  Pt. was given a PRN of Ativan due to her agitation and irritability.    Mari Davila from [facility] met with pt. on the unit yesterday, 1/13, to complete the Level 2 screening.  Writer will receive the result of the Level 2 screening in about 1 week.  Writer spoke with Tawanna -[phone] from  [facility] to inform her the Level 2 screening was in progress.  Nicoler was informed a bed is being held for pt.  Nicoler will continue to maintain contact with Tawanna and pt.'s family in regards to discharge planning.    Mental Status Exam:    Mood-  Irritable/Agitated    Sleep-  Normal    Appetite-  Normal    ADL's-  Fair    Observation-  Constant    Pt. is not yet ready for discharge at this time.

## 2020-01-14 NOTE — PROGRESS NOTE BEHAVIORAL HEALTH - NSBHFUPINTERVALCCFT_PSY_A_CORE
pt is loud and disruptive. Again showing preoccupation with dying. Required prn IM ativan this morning.  Insight and judgement are limited.

## 2020-01-15 DIAGNOSIS — R94.5 ABNORMAL RESULTS OF LIVER FUNCTION STUDIES: ICD-10-CM

## 2020-01-15 PROCEDURE — 76705 ECHO EXAM OF ABDOMEN: CPT | Mod: 26

## 2020-01-15 PROCEDURE — 99232 SBSQ HOSP IP/OBS MODERATE 35: CPT

## 2020-01-15 RX ADMIN — Medication 1 MILLIGRAM(S): at 20:39

## 2020-01-15 RX ADMIN — SERTRALINE 75 MILLIGRAM(S): 25 TABLET, FILM COATED ORAL at 08:55

## 2020-01-15 RX ADMIN — QUETIAPINE FUMARATE 100 MILLIGRAM(S): 200 TABLET, FILM COATED ORAL at 08:56

## 2020-01-15 RX ADMIN — QUETIAPINE FUMARATE 300 MILLIGRAM(S): 200 TABLET, FILM COATED ORAL at 20:38

## 2020-01-15 RX ADMIN — Medication 1 MILLIGRAM(S): at 08:57

## 2020-01-15 NOTE — PROGRESS NOTE ADULT - SUBJECTIVE AND OBJECTIVE BOX
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  Psychosis, unspecified psychosis type  Psychosis, unspecified psychosis type  Anxiety  Neurocognitive disorder  Psychosis, unspecified psychosis type  Type 2 diabetes mellitus without complication, without long-term current use of insulin  Diabetes mellitus  Psychosis  No significant past surgical history    HEALTH ISSUES - PROBLEM Dx:  Psychosis, unspecified psychosis type  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  Psychosis, unspecified psychosis type  Psychosis, unspecified psychosis type  Anxiety  Neurocognitive disorder  Psychosis, unspecified psychosis type: Psychosis, unspecified psychosis type  Type 2 diabetes mellitus without complication, without long-term current use of insulin: Type 2 diabetes mellitus without complication, without long-term current use of insulin  Diabetes mellitus: Diabetes mellitus  Psychosis: Psychosis        PAST MEDICAL & SURGICAL HISTORY:  Diabetes mellitus: -   - Diet controlled  Psychosis  No significant past surgical history    No Known Allergies      FAMILY HISTORY:  No pertinent family history in first degree relatives      LORazepam     Tablet 1 milliGRAM(s) Oral every 12 hours  nicotine -  14 mG/24Hr(s) Patch 1 patch Transdermal daily  QUEtiapine 300 milliGRAM(s) Oral at bedtime  QUEtiapine 50 milliGRAM(s) Oral every 6 hours PRN  QUEtiapine 100 milliGRAM(s) Oral <User Schedule>  sertraline 75 milliGRAM(s) Oral <User Schedule>      T(C): 37.4 (01-15-20 @ 05:51), Max: 37.4 (01-15-20 @ 05:51)  HR: 81 (01-15-20 @ 05:51) (71 - 89)  BP: 129/61 (01-15-20 @ 05:51) (106/65 - 129/61)  RR: 16 (01-15-20 @ 05:51) (16 - 18)  SpO2: --    PE;  general:  no changes noetd pt still very somatic    Lungs:    Heart:    EXT:    Neuro:  aelrt no deficits                          CAPILLARY BLOOD GLUCOSE

## 2020-01-15 NOTE — PROGRESS NOTE BEHAVIORAL HEALTH - NSBHFUPINTERVALCCFT_PSY_A_CORE
Pt was seen lying in bed. She reports feeling "tired" and otherwise has no complaints. She denies depression, anxiety or SI, reports "fine" mood. Of note, she is disoriented to time, thinks that he current year in 1999 and she is 62yo. As per unit staff, Pt remains preoccupied with dying, received Ativan PRN yesterday, today has been mostly sleeping. Insight and judgement are limited.

## 2020-01-16 DIAGNOSIS — F28 OTHER PSYCHOTIC DISORDER NOT DUE TO A SUBSTANCE OR KNOWN PHYSIOLOGICAL CONDITION: ICD-10-CM

## 2020-01-16 PROCEDURE — 99231 SBSQ HOSP IP/OBS SF/LOW 25: CPT

## 2020-01-16 RX ADMIN — QUETIAPINE FUMARATE 100 MILLIGRAM(S): 200 TABLET, FILM COATED ORAL at 08:57

## 2020-01-16 RX ADMIN — SERTRALINE 75 MILLIGRAM(S): 25 TABLET, FILM COATED ORAL at 08:57

## 2020-01-16 RX ADMIN — Medication 1 MILLIGRAM(S): at 08:57

## 2020-01-16 RX ADMIN — QUETIAPINE FUMARATE 300 MILLIGRAM(S): 200 TABLET, FILM COATED ORAL at 21:24

## 2020-01-16 RX ADMIN — Medication 1 MILLIGRAM(S): at 21:23

## 2020-01-16 NOTE — PROGRESS NOTE BEHAVIORAL HEALTH - NSBHFUPINTERVALCCFT_PSY_A_CORE
Pt is slightly less intrusive, although she continues to repeat 'am I dying", but is more easily redirected.  No s/h ideation.  No somatic c/o

## 2020-01-17 PROCEDURE — 99231 SBSQ HOSP IP/OBS SF/LOW 25: CPT

## 2020-01-17 RX ADMIN — QUETIAPINE FUMARATE 300 MILLIGRAM(S): 200 TABLET, FILM COATED ORAL at 20:27

## 2020-01-17 RX ADMIN — SERTRALINE 75 MILLIGRAM(S): 25 TABLET, FILM COATED ORAL at 08:12

## 2020-01-17 RX ADMIN — QUETIAPINE FUMARATE 100 MILLIGRAM(S): 200 TABLET, FILM COATED ORAL at 08:12

## 2020-01-17 RX ADMIN — Medication 1 MILLIGRAM(S): at 08:12

## 2020-01-17 RX ADMIN — Medication 1 MILLIGRAM(S): at 20:28

## 2020-01-17 NOTE — PROGRESS NOTE BEHAVIORAL HEALTH - LANGUAGE
Normal naming/Impaired repetition
Normal naming
Normal naming/Impaired repetition
Normal naming/Impaired repetition
Impaired repetition/Normal naming
No abnormalities noted
Normal naming
No abnormalities noted
Normal naming
Normal naming
Impaired repetition/Normal naming
Normal naming/Impaired repetition
Impaired repetition/Normal naming
No abnormalities noted
No abnormalities noted
Normal naming/Impaired repetition
Impaired repetition/Normal naming
No abnormalities noted
No abnormalities noted

## 2020-01-17 NOTE — PROGRESS NOTE BEHAVIORAL HEALTH - IMPULSE CONTROL
Normal
Impaired
Normal

## 2020-01-17 NOTE — PROGRESS NOTE BEHAVIORAL HEALTH - NSBHFUPINTERVALCCFT_PSY_A_CORE
Pt is isolative and not as preoccupied with dying.  Mood is neutral; no s/h ideation. No behavior issues.  Awaiting IPRO eval; pt reportedly has bed available in nursing facility once approval obtained

## 2020-01-17 NOTE — CHART NOTE - NSCHARTNOTEFT_GEN_A_CORE
Treatment team met with patient to discuss treatment plan, medications and discharge plan.  During the day patient is observed to be isolative to room.  Patient was educated to the benefits of attending and actively participating in groups that are offered on the unit.  Patient is amenable and agrees to be more active.     This worker met with patient to discuss discharge plan.  Patient will resume treatment on an outpatient basis when stable for discharge.  Patient is aware and agreeable to same.      Mental Status Exam:    Mood – Low  Sleep - Fair  Appetite - Good  ADLs - Fair  Thought Process – Linear    Observation – [id number]    No barriers to discharge identified at this time.   At this time patient is not psychiatrically stable for discharge.

## 2020-01-17 NOTE — PROGRESS NOTE BEHAVIORAL HEALTH - GAIT / STATION
Normal gait / station
Other
Normal gait / station

## 2020-01-17 NOTE — PROGRESS NOTE BEHAVIORAL HEALTH - AXIS III
hepatitis C, DM2
hepatitis C
hepatitis C, DM2
hepatitis C
hepatitis C, DM2
hepatitis C
hepatitis C
hepatitis C, DM2
hepatitis C, DM2
hepatitis C
hepatitis C, DM2
hepatitis C

## 2020-01-17 NOTE — PROGRESS NOTE BEHAVIORAL HEALTH - RELATEDNESS
Good
Fair
Good

## 2020-01-18 RX ADMIN — QUETIAPINE FUMARATE 100 MILLIGRAM(S): 200 TABLET, FILM COATED ORAL at 08:11

## 2020-01-18 RX ADMIN — Medication 1 MILLIGRAM(S): at 20:13

## 2020-01-18 RX ADMIN — SERTRALINE 75 MILLIGRAM(S): 25 TABLET, FILM COATED ORAL at 08:11

## 2020-01-18 RX ADMIN — Medication 1 MILLIGRAM(S): at 08:11

## 2020-01-18 RX ADMIN — QUETIAPINE FUMARATE 300 MILLIGRAM(S): 200 TABLET, FILM COATED ORAL at 20:13

## 2020-01-18 RX ADMIN — Medication 1 PATCH: at 18:42

## 2020-01-18 RX ADMIN — Medication 1 PATCH: at 08:11

## 2020-01-19 RX ADMIN — SERTRALINE 75 MILLIGRAM(S): 25 TABLET, FILM COATED ORAL at 08:41

## 2020-01-19 RX ADMIN — QUETIAPINE FUMARATE 300 MILLIGRAM(S): 200 TABLET, FILM COATED ORAL at 20:32

## 2020-01-19 RX ADMIN — Medication 1 MILLIGRAM(S): at 20:23

## 2020-01-19 RX ADMIN — QUETIAPINE FUMARATE 100 MILLIGRAM(S): 200 TABLET, FILM COATED ORAL at 08:41

## 2020-01-19 RX ADMIN — Medication 1 MILLIGRAM(S): at 08:42

## 2020-01-20 RX ADMIN — QUETIAPINE FUMARATE 100 MILLIGRAM(S): 200 TABLET, FILM COATED ORAL at 08:44

## 2020-01-20 RX ADMIN — Medication 1 MILLIGRAM(S): at 08:39

## 2020-01-20 RX ADMIN — QUETIAPINE FUMARATE 300 MILLIGRAM(S): 200 TABLET, FILM COATED ORAL at 20:24

## 2020-01-20 RX ADMIN — SERTRALINE 75 MILLIGRAM(S): 25 TABLET, FILM COATED ORAL at 08:39

## 2020-01-20 RX ADMIN — Medication 1 MILLIGRAM(S): at 20:24

## 2020-01-20 NOTE — PROGRESS NOTE ADULT - SUBJECTIVE AND OBJECTIVE BOX
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  Liver function study, abnormal  Psychosis, unspecified psychosis type  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 to substance or known physiological condition  Psychosis, unspecified psychosis type  Psychosis, unspecified psychosis type  Anxiety  Neurocognitive disorder  Psychosis, unspecified psychosis type  Type 2 diabetes mellitus without complication, without long-term current use of insulin  Diabetes mellitus  Psychosis  No significant past surgical history      No Known Allergies          LORazepam     Tablet 1 milliGRAM(s) Oral every 12 hours  nicotine -  14 mG/24Hr(s) Patch 1 patch Transdermal daily  QUEtiapine 300 milliGRAM(s) Oral at bedtime  QUEtiapine 50 milliGRAM(s) Oral every 6 hours PRN  QUEtiapine 100 milliGRAM(s) Oral <User Schedule>  sertraline 75 milliGRAM(s) Oral <User Schedule>      T(C): 36 (01-20-20 @ 05:53), Max: 36.2 (01-19-20 @ 15:34)  HR: 62 (01-20-20 @ 05:53) (62 - 65)  BP: 98/54 (01-20-20 @ 05:53) (98/54 - 107/59)  RR: 18 (01-20-20 @ 05:53) (16 - 18)  SpO2: --    PE;  general:  christ cute changes in nad  still very somatic    Lungs:    Heart:    EXT:    Neuro: no deficits                          CAPILLARY BLOOD GLUCOSE

## 2020-01-21 DIAGNOSIS — F28 OTHER PSYCHOTIC DISORDER NOT DUE TO A SUBSTANCE OR KNOWN PHYSIOLOGICAL CONDITION: ICD-10-CM

## 2020-01-21 PROCEDURE — 99231 SBSQ HOSP IP/OBS SF/LOW 25: CPT

## 2020-01-21 RX ORDER — HYDROXYZINE HCL 10 MG
50 TABLET ORAL EVERY 8 HOURS
Refills: 0 | Status: DISCONTINUED | OUTPATIENT
Start: 2020-01-21 | End: 2020-01-23

## 2020-01-21 RX ADMIN — Medication 1 MILLIGRAM(S): at 08:12

## 2020-01-21 RX ADMIN — SERTRALINE 75 MILLIGRAM(S): 25 TABLET, FILM COATED ORAL at 08:11

## 2020-01-21 RX ADMIN — QUETIAPINE FUMARATE 100 MILLIGRAM(S): 200 TABLET, FILM COATED ORAL at 08:11

## 2020-01-21 RX ADMIN — QUETIAPINE FUMARATE 300 MILLIGRAM(S): 200 TABLET, FILM COATED ORAL at 20:35

## 2020-01-21 RX ADMIN — Medication 1 MILLIGRAM(S): at 20:35

## 2020-01-21 NOTE — PROGRESS NOTE BEHAVIORAL HEALTH - NSBHFUPINTERVALCCFT_PSY_A_CORE
pt is less intrusive; less repetitive. No somatic c/o.  Doesn't ask 'am I dying" as frequently, and accepts redirection

## 2020-01-21 NOTE — CHART NOTE - NSCHARTNOTEFT_GEN_A_CORE
The treatment team met with pt. to discuss treatment plan, medications and discharge plan.  Pt. continues to present with fixed delusions about dying.  She is accepting re-direction better than she previously had and has been less intrusive.  There is no reported suicidal or homicidal ideation.    Writer faxed a message to [facility] to check the status of the Level 2 screening.  [facility] stated they would hold a bed for pt. until the Level 2 is received.  An [facility] representative came to the until on 1/13 to screen pt..  Writer will continue to follow up.    Mental Status Exam:    Mood-  Anxious    Sleep-  Normal    Appetite-  Normal    Thought Process-  Impaired Reasoning/Illogical    ADL's-  Fair    Observation-  q 10 minutes    Pt. is not yet ready for discharge at this time due to lack of safe discharge placement.

## 2020-01-22 PROCEDURE — 99231 SBSQ HOSP IP/OBS SF/LOW 25: CPT

## 2020-01-22 RX ADMIN — Medication 50 MILLIGRAM(S): at 20:46

## 2020-01-22 RX ADMIN — Medication 1 MILLIGRAM(S): at 13:49

## 2020-01-22 RX ADMIN — SERTRALINE 75 MILLIGRAM(S): 25 TABLET, FILM COATED ORAL at 09:12

## 2020-01-22 RX ADMIN — Medication 50 MILLIGRAM(S): at 09:15

## 2020-01-22 RX ADMIN — Medication 1 MILLIGRAM(S): at 20:46

## 2020-01-22 RX ADMIN — QUETIAPINE FUMARATE 300 MILLIGRAM(S): 200 TABLET, FILM COATED ORAL at 20:47

## 2020-01-22 RX ADMIN — QUETIAPINE FUMARATE 100 MILLIGRAM(S): 200 TABLET, FILM COATED ORAL at 09:13

## 2020-01-22 NOTE — PROGRESS NOTE BEHAVIORAL HEALTH - NSBHADMITMEDEDU_PSY_A_CORE
yes...

## 2020-01-22 NOTE — PROGRESS NOTE BEHAVIORAL HEALTH - ATTENTION / CONCENTRATION
Impaired
Normal
Impaired
Normal
Impaired
Normal
Normal
Impaired
Impaired
Normal
Normal

## 2020-01-22 NOTE — PROGRESS NOTE BEHAVIORAL HEALTH - ESTIMATED INTELLIGENCE
Below Average
Average

## 2020-01-22 NOTE — PROGRESS NOTE BEHAVIORAL HEALTH - NSBHADMITIPOBSFT_PSY_A_CORE
as indicated
as indicated
as clinically indicated
as clinically indicated
as indicated
as indicated
as clinically indicated
as indicated
as is clinically indicated
as clinically indicated
for safety
as clinically indicated
for safety
for safety
as clinically indicated
for safety
as clinically indicated
as clinically indicated
for safety

## 2020-01-22 NOTE — PROGRESS NOTE BEHAVIORAL HEALTH - NSBHFUPSUICINTERVAL_PSY_A_CORE
none known

## 2020-01-22 NOTE — PROGRESS NOTE ADULT - PROBLEM SELECTOR PLAN 2
edmond on no tx except god diet
fabian triana no current meds
lfts ntoed  sono bharat chronc gallsotnes no acute changes
monitor lfts
no tx needed and stable
positive hep c noted  mild elvation ntoed  will get abd sono to review liver

## 2020-01-22 NOTE — PROGRESS NOTE BEHAVIORAL HEALTH - NSBHFUPTYPE_PSY_A_CORE
Inpatient
Inpatient-On Service Note
Inpatient

## 2020-01-22 NOTE — PROGRESS NOTE BEHAVIORAL HEALTH - THOUGHT PROCESS
Linear/Perseverative
Tangential
Tangential/Disorganized
Linear/Perseverative
Perseverative/Impaired reasoning
Perseverative/Impaired reasoning
Linear/Other
Linear/Perseverative
Other/Linear
Perseverative/Impaired reasoning
Perseverative/Linear
Linear
Other/Linear
Impaired reasoning/Perseverative
Linear/Perseverative
Perseverative/Impaired reasoning
Linear
Linear/Perseverative
Perseverative/Impaired reasoning
Perseverative/Linear
Linear
Linear

## 2020-01-22 NOTE — PROGRESS NOTE BEHAVIORAL HEALTH - NSBHFUPINTERVALCCFT_PSY_A_CORE
Pt is complying with meds and treatment plan.  She is repetitive and exhibits thought derailment. Her PO intake is good, and she is without thoughts to harm self.  She has been accepted at nursing home and discharge is anticipated for tomorrow

## 2020-01-22 NOTE — PROGRESS NOTE BEHAVIORAL HEALTH - NSBHATTESTSEENBY_PSY_A_CORE
attending Psychiatrist without NP/Trainee
attending Psychiatrist without NP/Trainee
Attending Psychiatrist supervising NP/Trainee, meeting pt...
attending Psychiatrist without NP/Trainee
Attending Psychiatrist supervising NP/Trainee, meeting pt...
attending Psychiatrist without NP/Trainee
Attending Psychiatrist supervising NP/Trainee, meeting pt...
attending Psychiatrist without NP/Trainee
Attending Psychiatrist supervising NP/Trainee, meeting pt...
attending Psychiatrist without NP/Trainee
Attending Psychiatrist supervising NP/Trainee, meeting pt...

## 2020-01-22 NOTE — PROGRESS NOTE BEHAVIORAL HEALTH - NS ED BHA AXIS I PRIMARY CODE FT
F24
R41.9
F23
F25
R41.9
R41.9
F20
F23
F23
F25
F23
F29
F2
R41.9
R41.9
F20
F22
R41.9
F2
R41.9
R41.9

## 2020-01-22 NOTE — PROGRESS NOTE BEHAVIORAL HEALTH - NS ED BHA MSE SPEECH VOLUME
Normal
Soft
Normal
Soft
Normal
Normal
Soft
Normal
Soft

## 2020-01-22 NOTE — PROGRESS NOTE BEHAVIORAL HEALTH - PERCEPTIONS
No abnormalities

## 2020-01-22 NOTE — PROGRESS NOTE BEHAVIORAL HEALTH - MOOD
Normal
Anxious/Normal
Normal

## 2020-01-22 NOTE — PROGRESS NOTE BEHAVIORAL HEALTH - BEHAVIOR
Cooperative

## 2020-01-22 NOTE — CHART NOTE - NSCHARTNOTEFT_GEN_A_CORE
Nicoler spoke with Tawanna from [facility] to inform her the Level 2 screening has been completed.  She requested writer also send an updated ALEXANDER and medication list/MARS.  All requested documentation was sent via [employer].  Nicoler will follow up.  In addition, nicoler informed pt.'s son of the impending transfer.

## 2020-01-22 NOTE — PROGRESS NOTE BEHAVIORAL HEALTH - NSBHCONSORIP_PSY_A_CORE
Inpatient Admission...

## 2020-01-22 NOTE — PROGRESS NOTE BEHAVIORAL HEALTH - PRIMARY DX
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
Neurocognitive disorder
Neurocognitive disorder
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
Psychosis, unspecified psychosis type
Neurocognitive disorder
Psychosis, unspecified psychosis type
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
Psychosis, unspecified psychosis type
Psychosis, unspecified psychosis type
Neurocognitive disorder
Psychosis, unspecified psychosis type
Psychosis, unspecified psychosis type
Neurocognitive disorder
Neurocognitive disorder
Psychosis, unspecified psychosis type
Neurocognitive disorder
Neurocognitive disorder

## 2020-01-22 NOTE — PROGRESS NOTE BEHAVIORAL HEALTH - NSBHADMITIPBHPROVIDER_PSY_A_CORE
N/A
no
N/A
no
no
N/A
no
N/A
N/A
no

## 2020-01-22 NOTE — PROGRESS NOTE BEHAVIORAL HEALTH - JUDGMENT (REGARDING EVERYDAY EVENTS)
Fair

## 2020-01-22 NOTE — PROGRESS NOTE BEHAVIORAL HEALTH - NSBHADMITIPREASON_PSY_A_CORE
Danger to self; mental illness expected to respond to inpatient care

## 2020-01-22 NOTE — PROGRESS NOTE BEHAVIORAL HEALTH - NSBHPTASSESSDT_PSY_A_CORE
02-Jan-2020 09:30
03-Jan-2020 12:00
06-Jan-2020 09:00
07-Jan-2020 11:02
08-Jan-2020 10:45
09-Jan-2020 12:40
10-Ismael-2020 14:55
13-Dec-2019 10:00
14-Dec-2019 17:38
14-Jan-2020 09:50
15-Dec-2019 10:16
15-Ismael-2020 14:38
16-Dec-2019 09:00
16-Jan-2020 14:33
17-Dec-2019 09:45
17-Jan-2020 13:34
18-Dec-2019 15:02
19-Dec-2019 10:58
20-Dec-2019 11:28
21-Jan-2020 13:36
22-Jan-2020 13:52
23-Dec-2019 09:40
24-Dec-2019 09:57
26-Dec-2019 10:00
27-Dec-2019 11:37
30-Dec-2019 09:00
31-Dec-2019 10:07

## 2020-01-22 NOTE — PROGRESS NOTE BEHAVIORAL HEALTH - NSBHLEGALSTATUS_PSY_A_CORE
9.27 (2PC)

## 2020-01-22 NOTE — PROGRESS NOTE BEHAVIORAL HEALTH - NSBHFUPMEDSE_PSY_A_CORE
None known
Yes
None known
Yes
Yes
None known
None known

## 2020-01-22 NOTE — PROGRESS NOTE BEHAVIORAL HEALTH - NSBHLOC_PSY_A_CORE
Alert
Lethargic, arousable to verbal stimulus
Alert

## 2020-01-22 NOTE — PROGRESS NOTE ADULT - SUBJECTIVE AND OBJECTIVE BOX
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 to substance or known physiological condition  Liver function study, abnormal  Psychosis, unspecified psychosis type  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 to substance or known physiological condition  Psychosis, unspecified psychosis type  Psychosis, unspecified psychosis type  Anxiety  Neurocognitive disorder  Psychosis, unspecified psychosis type  Type 2 diabetes mellitus without complication, without long-term current use of insulin  Diabetes mellitus  Psychosis  No significant past surgical history    HEALTH ISSUES - PROBLEM Dx:  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 to substance or known physiological condition  Liver function study, abnormal: Liver function study, abnormal  Psychosis, unspecified psychosis type  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 to substance or known physiological condition  Psychosis, unspecified psychosis type  Psychosis, unspecified psychosis type  Anxiety  Neurocognitive disorder  Psychosis, unspecified psychosis type: Psychosis, unspecified psychosis type  Type 2 diabetes mellitus without complication, without long-term current use of insulin: Type 2 diabetes mellitus without complication, without long-term current use of insulin  Diabetes mellitus: Diabetes mellitus  Psychosis: Psychosis        PAST MEDICAL & SURGICAL HISTORY:  Diabetes mellitus: -   - Diet controlled  Psychosis  No significant past surgical history    No Known Allergies      FAMILY HISTORY:  No pertinent family history in first degree relatives      hydrOXYzine hydrochloride 50 milliGRAM(s) Oral every 8 hours PRN  nicotine -  14 mG/24Hr(s) Patch 1 patch Transdermal daily  QUEtiapine 300 milliGRAM(s) Oral at bedtime  QUEtiapine 50 milliGRAM(s) Oral every 6 hours PRN  QUEtiapine 100 milliGRAM(s) Oral <User Schedule>  sertraline 75 milliGRAM(s) Oral <User Schedule>      T(C): 35.9 (01-22-20 @ 06:00), Max: 36.6 (01-21-20 @ 17:08)  HR: 67 (01-22-20 @ 06:00) (67 - 95)  BP: 118/56 (01-22-20 @ 06:00) (95/57 - 118/56)  RR: 18 (01-22-20 @ 06:00) (16 - 18)  SpO2: --    PE;  general:  stable in nad    Lungs:    Heart:    EXT:    Neuro:  aelrt nod eficits                          CAPILLARY BLOOD GLUCOSE

## 2020-01-22 NOTE — PROGRESS NOTE BEHAVIORAL HEALTH - NSBHADMITDANGERSELF_PSY_A_CORE
suicidal behavior
unable to care for self

## 2020-01-22 NOTE — PROGRESS NOTE BEHAVIORAL HEALTH - AFFECT RANGE
Full
Blunted/Constricted
Constricted
Full
Full
Constricted
Full
Constricted
Full
Full
Constricted
Full
Full
Constricted

## 2020-01-22 NOTE — PROGRESS NOTE ADULT - REASON FOR ADMISSION
psychosis

## 2020-01-22 NOTE — PROGRESS NOTE BEHAVIORAL HEALTH - THOUGHT CONTENT
Preoccupations
Unremarkable
Unremarkable
Other/Preoccupations
Preoccupations
Preoccupations/Other
Other/Preoccupations
Preoccupations
Preoccupations
Preoccupations/Other
Other/Preoccupations
Preoccupations
Preoccupations/Other
Preoccupations

## 2020-01-22 NOTE — PROGRESS NOTE BEHAVIORAL HEALTH - NS ED [facility] MED ROS GASTROINTESTINAL
No complaints
Yes
No complaints

## 2020-01-22 NOTE — PROGRESS NOTE BEHAVIORAL HEALTH - BODY HABITUS
Underweight
Well nourished
Underweight
Well nourished
Underweight

## 2020-01-22 NOTE — PROGRESS NOTE BEHAVIORAL HEALTH - INSIGHT (REGARDING PSYCHIATRIC ILLNESS)
Fair
Fair
Poor

## 2020-01-22 NOTE — PROGRESS NOTE BEHAVIORAL HEALTH - NSBHADMITIPOBS_PSY_A_CORE
Routine observation
Enhanced supervision
Routine observation
Enhanced supervision
Enhanced supervision
Routine observation
Routine observation
Enhanced supervision
Routine observation
Routine observation
Enhanced supervision

## 2020-01-22 NOTE — PROGRESS NOTE BEHAVIORAL HEALTH - NSBHADMITIPDSM_PSY_A_CORE
see above for Axis I, II, III

## 2020-01-22 NOTE — PROGRESS NOTE BEHAVIORAL HEALTH - HYGIENE
Fair
Good
Fair
Good
Fair

## 2020-01-23 VITALS
RESPIRATION RATE: 18 BRPM | HEART RATE: 68 BPM | DIASTOLIC BLOOD PRESSURE: 67 MMHG | TEMPERATURE: 96 F | SYSTOLIC BLOOD PRESSURE: 108 MMHG

## 2020-01-23 PROCEDURE — 99238 HOSP IP/OBS DSCHRG MGMT 30/<: CPT

## 2020-01-23 RX ORDER — HYDROXYZINE HCL 10 MG
1 TABLET ORAL
Qty: 0 | Refills: 0 | DISCHARGE
Start: 2020-01-23

## 2020-01-23 RX ORDER — QUETIAPINE FUMARATE 200 MG/1
1 TABLET, FILM COATED ORAL
Qty: 0 | Refills: 0 | DISCHARGE
Start: 2020-01-23

## 2020-01-23 RX ORDER — SERTRALINE 25 MG/1
3 TABLET, FILM COATED ORAL
Qty: 0 | Refills: 0 | DISCHARGE
Start: 2020-01-23

## 2020-01-23 RX ADMIN — SERTRALINE 75 MILLIGRAM(S): 25 TABLET, FILM COATED ORAL at 08:45

## 2020-01-23 RX ADMIN — Medication 1 MILLIGRAM(S): at 08:45

## 2020-01-23 RX ADMIN — QUETIAPINE FUMARATE 100 MILLIGRAM(S): 200 TABLET, FILM COATED ORAL at 08:46

## 2020-01-23 NOTE — DISCHARGE NOTE BEHAVIORAL HEALTH - NSBHDCSWCOMMENTSFT_PSY_A_CORE
Discharge information faxed to the next level of care-[facility]-[phone] and sent via [employer] on 1/23/20 at 1 pm

## 2020-01-23 NOTE — DISCHARGE NOTE BEHAVIORAL HEALTH - PAST PSYCHIATRIC HISTORY
prior admission in remote past in [place] per family; no details available. ? dx of bipolar disorder

## 2020-01-23 NOTE — DISCHARGE NOTE BEHAVIORAL HEALTH - MEDICATION SUMMARY - MEDICATIONS TO TAKE
I will START or STAY ON the medications listed below when I get home from the hospital:    LORazepam 1 mg oral tablet  -- 1 tab(s) by mouth every 12 hours until stopped by MD  -- Indication: For Anxiety    sertraline 25 mg oral tablet  -- 3 tab(s) by mouth  q day until stopped by MD  -- Indication: For Bipolar affective disorder, remission status unspecified    QUEtiapine 300 mg oral tablet  -- 1 tab(s) by mouth once a day (at bedtime) until stopped by MD  -- Indication: For Bipolar affective disorder, remission status unspecified    QUEtiapine 100 mg oral tablet  -- 1 tab(s) by mouth  daily until stopped by MD  -- Indication: For Bipolar affective disorder, remission status unspecified    hydrOXYzine hydrochloride 50 mg oral tablet  -- 1 tab(s) by mouth every 8 hours, As needed, anxiety and insomnia until stopped by MD  -- Indication: For Anxiety

## 2020-01-23 NOTE — DISCHARGE NOTE BEHAVIORAL HEALTH - HPI (INCLUDE ILLNESS QUALITY, SEVERITY, DURATION, TIMING, CONTEXT, MODIFYING FACTORS, ASSOCIATED SIGNS AND SYMPTOMS)
63 y/o female with remote hx of polysubstance abuse, and current working dx of frontotemporal dementia, was admitted here in transfer from medical unit. pt had been brought by family because of behaviors and disorganization at home.  She was medically cleared and then sent to psych for treatment.    Pt had meds adjusted; her disorganization was slightly improved, but she remained ruminative and with noted disorientation and cognitive deficits.  CT scan showed frontal atrophy; an MRI was attempted but pt was unable to follow thru with test.    Pt continued to need reassurance, and would asked constantly '"am I dying...am I dying".  Followint discussion with family nursing home placement was agreed upon and placement was found.    Pt was d/c'd in stable condition.  She remained disoriented to time and place, and often wandered aimlessly on unit.  There were no overt delusions and no hallucinations evident

## 2020-01-23 NOTE — CHART NOTE - NSCHARTNOTEFT_GEN_A_CORE
Pt. is scheduled for discharge on this date.  She will be transferring to the [facility].  Pt. was cleared by Tawanna for admission.  As requested, the Level 2 screening was sent along with an updated ALEXANDER and MARS, all sent on 1/22/20 via [employer].  On this date the updated medication list was sent along with the discharge summary.  This was sent via fax and [employer].    Writer spoke with pt.'s son, Torey on this date and confirmed discharge details.  Pt is aware she is transferring to a nursing home and verbalized agreement.  She presents as stable for transfer on this date.

## 2020-01-23 NOTE — DISCHARGE NOTE BEHAVIORAL HEALTH - MEDICATION SUMMARY - MEDICATIONS TO STOP TAKING
I will STOP taking the medications listed below when I get home from the hospital:    sertraline 50 mg oral tablet  -- 1 tab(s) by mouth    QUEtiapine 100 mg oral tablet  -- 1 tab(s) by mouth once a day (at bedtime)    QUEtiapine 50 mg oral tablet  -- 1 tab(s) by mouth once a day in am    LORazepam 0.5 mg oral tablet  -- 1 tab(s) by mouth every 6 hours, As needed, Agitation    nicotine 21 mg/24 hr transdermal film, extended release  -- 1 patch by transdermal patch once a day    folic acid 1 mg oral tablet  -- 1 tab(s) by mouth once a day

## 2020-01-27 DIAGNOSIS — F17.210 NICOTINE DEPENDENCE, CIGARETTES, UNCOMPLICATED: ICD-10-CM

## 2020-01-27 DIAGNOSIS — E11.9 TYPE 2 DIABETES MELLITUS WITHOUT COMPLICATIONS: ICD-10-CM

## 2020-01-27 DIAGNOSIS — R41.9 UNSPECIFIED SYMPTOMS AND SIGNS INVOLVING COGNITIVE FUNCTIONS AND AWARENESS: ICD-10-CM

## 2020-01-27 DIAGNOSIS — B19.20 UNSPECIFIED VIRAL HEPATITIS C WITHOUT HEPATIC COMA: ICD-10-CM

## 2020-01-27 DIAGNOSIS — F31.9 BIPOLAR DISORDER, UNSPECIFIED: ICD-10-CM

## 2020-01-27 DIAGNOSIS — F41.9 ANXIETY DISORDER, UNSPECIFIED: ICD-10-CM

## 2020-01-27 DIAGNOSIS — F03.90 UNSPECIFIED DEMENTIA WITHOUT BEHAVIORAL DISTURBANCE: ICD-10-CM

## 2020-01-27 DIAGNOSIS — B35.1 TINEA UNGUIUM: ICD-10-CM

## 2020-01-27 DIAGNOSIS — F29 UNSPECIFIED PSYCHOSIS NOT DUE TO A SUBSTANCE OR KNOWN PHYSIOLOGICAL CONDITION: ICD-10-CM

## 2020-01-27 DIAGNOSIS — Z28.20 IMMUNIZATION NOT CARRIED OUT BECAUSE OF PATIENT DECISION FOR UNSPECIFIED REASON: ICD-10-CM

## 2020-06-23 NOTE — PROGRESS NOTE BEHAVIORAL HEALTH - NSBHFUPINTERVALHXFT_PSY_A_CORE
Physical Therapy Daily Treatment Note     Name: Joao Jang  [facility] Number: [id number]    Therapy Diagnosis:   Encounter Diagnoses   Name Primary?    Weakness of left hip     Stiffness of hip joint, left     Left hip pain      Physician: John Lantigua III, *    Visit Date: 6/23/2020  Physician Orders: PT Eval and Treat  Medical Diagnosis from Referral: S73.192A (ICD-10-CM) - Tear of left acetabular labrum, initial encounter  Evaluation Date: 6/15/2020  Authorization Period Expiration: 12/31/2020  Plan of Care Expiration: 12/25/2020  Visit # / Visits authorized: 2/20 (eval 1/1)     Time In: 1048  Time Out: 1132  Total Billable Time: 44 minutes     DOS: 06/12/2020  S/p: 1. Hip arthroscopic labral repair, knotless (CPT 01572)  2. Hip arthroscopic femoral neck osteoplasty (CPT 63829)  3. Hip arthroscopic partial synovectomy/debridement (CPT 30486)  4. Hip arthroscopic acetabuloplasty (CPT 51427)  5. Hip arthroscopic chondroplasty (CPT 00004)  6. Hip arthroscopic capsular closure (CPT 46428)  7. Hip fluoroscopic guidance for needle placement/localization (CPT 60805)  8. Hip arthoscopic loose body removal      Post-Op Precautions: We will follow the hip arthroscopy with osteoplasty, capsular closure, and labral repair guidelines.  The patient will be partial weightbearing for 3 weeks.    Precautions: Standard and Weightbearing     Subjective     Pt reports: that he's continuing to do better each day. Reports slip in shower a couple of days ago with some weight bearing on L leg to catch himself but no symptoms afterwards.  He was compliant with home exercise program.  Response to previous treatment: improvement in pain   Functional change: improved functional mobility w/ crutches    Pain: 3/10  Location: left hip      Objective     Daily Measurements:  PROM   Right Left   Flexion 90 110   ER NT NT   IR NT NT       Daily Treatment     Joao received the following manual therapy techniques: Joint  mobilizations were applied to the: L hip for 16 minutes, including:  - Skilled PROM to L hip flexion only for symptom management and to decrease risk of joint contracture  - Log rolling IR only to decrease risk of joint contracture     Joao participated in therapeutic exercises activities to improve: ROM, strength, endurance, and core stability for 28 minutes. The following activities were included:  - Ankle PF x50 GTB  - Glute sets 20x10s prone   - Quad sets 10x10s   - Upright bike x5' no resistance for L hip ROM    Joao received hot pack for 10 minutes to low back during therex. (not charged)    Home Exercises and Patient Education Provided     Education provided:   - cont with HEP, heat prn for hip stiffness and pain     Written Home Exercises Provided: Patient instructed to cont prior HEP.  Exercises were reviewed and Joao was able to demonstrate them prior to the end of the session.  Joao demonstrated good  understanding of the education provided.     See EMR under patient instructions for exercises given.     Assessment     11 days s/p L hip labral repair    Doing much better today. Hip flexion to 90 easily w/o symptom provocation. Able to introduce some cycling sans resistance. Still w/ some LBP symptoms w/ altered WB and increased sedentary activity, but able to manage w/ positional changes and heat therapy.    Joao is progressing well towards his goals.     Pt will continue to benefit from skilled outpatient physical therapy to address the deficits listed in the problem list box on initial evaluation, provide pt/family education and to maximize pt's level of independence in the home and community environment. Pt prognosis is Good.     Pt's spiritual, cultural and educational needs considered and pt agreeable to plan of care and goals.    Anticipated barriers to physical therapy: None    Goals:  Short Term Goals: 2-4 weeks  1. Pt will be compliant with HEP 50% of prescribed amount.   2. The pt  to demo improvement in L hip flexion PROM to at least 90 degrees for improved functional mobility.  3.  The pt will report L hip pain of <6/10 at worst to improve tolerance to therapy and functional mobility.     Long Term Goals: 20 weeks   1. Pt will be compliant with % of prescribed amount.   2. The pt will demonstrate normal gait mechanics sans AD over level surfaces for all community distances to restore functional mobility.  3. The patient will demonstrate hip MMT scores of at least 4+/5 grossly to improve strength for weight bearing activities.  4. The patient will report pain <2/10 at worst with all exercise to improve tolerance to therapy and recreation.  5. The pt will report full participation in ADLs and IADLs without restrictions related to L hip.    Plan     Follow Dr. Perez's post operative protocol for  hip arthroscopy with osteoplasty, capsular closure, and labral repair per surgical note.    John Osborn, PT , DPT, SCS, FAAOMPT       level 2 IPRO review completed; results to follow

## 2022-09-01 NOTE — PROGRESS NOTE ADULT - PROVIDER SPECIALTY LIST ADULT
ED NP Note      Patient : Elisabeth Webster Age: 38 year old Sex: female   MRN: [id number] Encounter Date: 2022      History     Chief Complaint   Patient presents with   • Abdominal Pain     HPI  An otherwise healthy 38-year-old  female patient presents with multiple complaints.  She tells me that she has been having back pain, abdominal pain, chest pain, shortness of breath and nausea for the past week.  She has had no fevers, vomiting or diarrhea.  She denies any dizziness or weakness.  Patient denies any pertinent past medical history.  Patient is awake, alert and oriented x4.  Nontoxic.  Not in any distress.  Allergies   Allergen Reactions   • Contrast Media HIVES     CT Iodinated Contrast Media; Patient began sneezing, itching, developing hives following CT scan with iodinated contrast media.   • Dust Other (See Comments)     Unknown   • No Name Available Other (See Comments)     No Known Drug Allergies   • Pollen Other (See Comments)     Unknown       Past Medical History:   Diagnosis Date   • Anemia    • Anemia    • Anxiety    • RAD (reactive airway disease)        Past Surgical History:   Procedure Laterality Date   •  SECTION, LOW TRANSVERSE     • WISDOM TOOTH EXTRACTION         Family History   Problem Relation Age of Onset   • Diabetes Maternal Grandmother    • Heart disease Maternal Grandmother    • Diabetes Mother    • Diabetes Maternal Aunt    • Heart disease Maternal Aunt    • Diabetes Maternal Uncle        Social History     Tobacco Use   • Smoking status: Never Smoker   • Smokeless tobacco: Never Used   Vaping Use   • Vaping Use: never used   Substance Use Topics   • Alcohol use: Never   • Drug use: Never       Review of Systems   Constitutional: Negative for chills, fatigue and fever.   HENT: Negative for congestion, ear pain and sore throat.    Respiratory: Positive for shortness of breath.    Cardiovascular: Positive for chest pain.   Gastrointestinal: Positive for abdominal pain  Internal Medicine and nausea. Negative for abdominal distention, diarrhea and vomiting.   Musculoskeletal: Positive for back pain.   Skin: Negative for rash.   Neurological: Negative for dizziness, weakness and numbness.     All Systems reviewed and negative except as noted in the HPI above  Physical Exam     ED Triage Vitals [08/31/22 2028]   ED Triage Vitals Group      Temp 97.4 °F (36.3 °C)      Heart Rate 75      Resp 16      BP 99/64      SpO2 100 %      EtCO2 mmHg       Height       Weight 132 lb 15 oz (60.3 kg)      Weight Scale Used       BMI (Calculated)       IBW/kg (Calculated)        Pulse Ox is [100] on Room Air which is [normal] for this patient    Physical Exam  Vitals and nursing note reviewed.   Constitutional:       General: She is not in acute distress.     Appearance: Normal appearance. She is well-developed and normal weight. She is not ill-appearing, toxic-appearing or diaphoretic.   HENT:      Head: Normocephalic.      Right Ear: Tympanic membrane, ear canal and external ear normal.      Left Ear: Tympanic membrane, ear canal and external ear normal.      Nose: Nose normal.      Mouth/Throat:      Mouth: Mucous membranes are moist.      Neck: Normal range of motion and neck supple.   Eyes:      Extraocular Movements: Extraocular movements intact.      Pupils: Pupils are equal, round, and reactive to light.   Cardiovascular:      Rate and Rhythm: Normal rate.      Pulses: Normal pulses.   Pulmonary:      Effort: Pulmonary effort is normal. No respiratory distress.      Breath sounds: Normal breath sounds. No stridor. No wheezing, rhonchi or rales.      Comments: O2 100% on RA  HR 75  Chest:      Chest wall: No tenderness.   Abdominal:      General: Abdomen is flat. Bowel sounds are normal. There is no distension.      Palpations: Abdomen is soft. There is no mass.      Tenderness: There is generalized abdominal tenderness. There is no right CVA tenderness, left CVA tenderness, guarding or rebound.      Hernia:  No hernia is present.      Comments: The abdomen is soft. Diffuse tenderness noted.  No guarding rigidity.  Negative psoas and obturator sign.   Musculoskeletal:         General: Normal range of motion.   Skin:     General: Skin is warm and dry.      Capillary Refill: Capillary refill takes less than 2 seconds.      Coloration: Skin is not cyanotic, jaundiced, mottled or pale.      Findings: No bruising, erythema, lesion or rash.   Neurological:      General: No focal deficit present.      Mental Status: She is alert and oriented to person, place, and time.   Psychiatric:         Mood and Affect: Mood normal.         ED Course   A: A nontoxic 38-year-old female presents with complaints of abdominal pain, back pain, chest pain, shortness of breath and nausea for the past few days.  She has had no vomiting, diarrhea or fever.  She denies any dizziness or weakness.  Patient denies any vaginal discharge or concern for STD's.  Patient denies any pertinent past medical history.  An IV was inserted and labs were drawn.  Patient medicated with IV fluids, Zofran and morphine.  Will obtain a CT of the abdomen, EKG  and chest x-ray.  Multiple medical diagnoses were considered at this time.  Urine preg negative  U/A unremarkable  CBC unremarkable  CMP unremarkable  Trop normal  D Dimer normal  CXR negative for any acute pathology  CT shows   Normal appendix.  Mild to moderate colonic diverticulosis without definite acute diverticulitis.  Right ovarian corpus luteum cyst measuring up to approximately 1.9 cm but no free fluid in the pelvis.    2303 Patient was brought back by CT tech patient started to complain of some itching.  Upon arrival back to the room patient does have some scattered rash noted to her legs.  She has no chest pain or shortness of breath.  She denies any facial or tongue swelling.  IV Solu-Medrol, Benadryl and Pepcid were ordered.    0011 Patient reassessed at this time..  She is resting comfortably in the  ED cart.  Labs and imaging discussed thus far.  Rash does seem to be much better.  Still having complaints of pain.  Due to CT finding of ovarian cyst.  Will obtain an ultrasound.    0100 Awaiting US at this time.  Care was endorsed to Brittanie at this time who agrees to take over assessment and plan of care.    Procedures    Lab Results     Results for orders placed or performed during the hospital encounter of 08/31/22   Comprehensive Metabolic Panel   Result Value Ref Range    Fasting Status      Sodium 139 135 - 145 mmol/L    Potassium 3.5 3.4 - 5.1 mmol/L    Chloride 103 97 - 110 mmol/L    Carbon Dioxide 27 21 - 32 mmol/L    Anion Gap 13 7 - 19 mmol/L    Glucose 83 70 - 99 mg/dL    BUN 11 6 - 20 mg/dL    Creatinine 0.61 0.51 - 0.95 mg/dL    Glomerular Filtration Rate >90 >=60    BUN/ Creatinine Ratio 18 7 - 25    Calcium 8.5 8.4 - 10.2 mg/dL    Bilirubin, Total 0.4 0.2 - 1.0 mg/dL    GOT/AST 16 <=37 Units/L    GPT/ALT 20 <64 Units/L    Alkaline Phosphatase 56 45 - 117 Units/L    Albumin 3.9 3.6 - 5.1 g/dL    Protein, Total 8.1 6.4 - 8.2 g/dL    Globulin 4.2 (H) 2.0 - 4.0 g/dL    A/G Ratio 0.9 (L) 1.0 - 2.4   Urinalysis & Reflex Microscopy With Culture If Indicated   Result Value Ref Range    COLOR, URINALYSIS Yellow     APPEARANCE, URINALYSIS Clear     GLUCOSE, URINALYSIS Negative Negative mg/dL    BILIRUBIN, URINALYSIS Negative Negative    KETONES, URINALYSIS Negative Negative mg/dL    SPECIFIC GRAVITY, URINALYSIS 1.010 1.005 - 1.030    OCCULT BLOOD, URINALYSIS Negative Negative    PH, URINALYSIS 7.5 (H) 5.0 - 7.0    PROTEIN, URINALYSIS Negative Negative mg/dL    UROBILINOGEN, URINALYSIS 0.2 0.2, 1.0 mg/dL    NITRITE, URINALYSIS Negative Negative    LEUKOCYTE ESTERASE, URINALYSIS Negative Negative   TROPONIN I, HIGH SENSITIVITY   Result Value Ref Range    Troponin I, High Sensitivity 4 <52 ng/L   D Dimer, Quantitative   Result Value Ref Range    D Dimer, Quantitative 0.50 <0.57 mg/L (FEU)   CBC with Automated  Differential (performable only)   Result Value Ref Range    WBC 6.6 4.2 - 11.0 K/mcL    RBC 3.86 (L) 4.00 - 5.20 mil/mcL    HGB 12.0 12.0 - 15.5 g/dL    HCT 36.8 36.0 - 46.5 %    MCV 95.3 78.0 - 100.0 fl    MCH 31.1 26.0 - 34.0 pg    MCHC 32.6 32.0 - 36.5 g/dL    RDW-CV 12.3 11.0 - 15.0 %    RDW-SD 42.9 39.0 - 50.0 fL     140 - 450 K/mcL    NRBC 0 <=0 /100 WBC    Neutrophil, Percent 42 %    Lymphocytes, Percent 50 %    Mono, Percent 6 %    Eosinophils, Percent 2 %    Basophils, Percent 0 %    Immature Granulocytes 0 %    Absolute Neutrophils 2.7 1.8 - 7.7 K/mcL    Absolute Lymphocytes 3.3 1.0 - 4.8 K/mcL    Absolute Monocytes 0.4 0.3 - 0.9 K/mcL    Absolute Eosinophils  0.1 0.0 - 0.5 K/mcL    Absolute Basophils 0.0 0.0 - 0.3 K/mcL    Absolute Immmature Granulocytes 0.0 0.0 - 0.2 K/mcL   HCG POC   Result Value Ref Range    HCG, URINE - POINT OF CARE Negative Negative       EKG Results   [EKG] SINUS RHYTHM   POSSIBLE RIGHT VENTRICULAR CONDUCTION DELAY   BORDERLINE ECG     Cardiac Monitor [Y]    Radiology Results     Imaging Results          CT ABDOMEN PELVIS W CONTRAST - IV contrast only (In process)                XR CHEST PA OR AP 1 VIEW (Final result)  Result time 08/31/22 21:46:39    Final result                 Impression:      Normal single view of the chest    Electronically Signed by: JITENDRA DOLAN MD   Signed on: 8/31/2022 9:46 PM                Narrative:    EXAMINATION: Chest single view study on   8/31/2022 9:24 PM     CLINICAL INDICATION: 38-year-old woman presenting with chest pain    COMPARISON: 1/8/2022     FINDINGS:   A single AP upright   portable view of the chest was obtained.  The heart is normal in size and contour.  The sara and mediastinum are normal.  The lungs are clear.  The costophrenic angles are sharp.  Bones, joints, and soft tissues are unremarkable.                                ED Medication Orders (From admission, onward)    Ordered Start     Status Ordering Provider     08/31/22 2301 08/31/22 2315  methylPREDNISolone (SOLU-Medrol) PF injection 125 mg  ONCE         Last MAR action: Given ANDREIA JASMINE    08/31/22 2301 08/31/22 2315  diphenhydrAMINE (BENADRYL) injection 25 mg  ONCE         Last MAR action: Given ANDREIA JASMINE    08/31/22 2303 08/31/22 2315  famotidine (PEPCID) injection 20 mg  ONCE         Last MAR action: Given ANDREIA JASMINE    08/31/22 2117 08/31/22 2130  ondansetron (ZOFRAN) injection 4 mg  ONCE         Last MAR action: Given ANDREIA JASMINE    08/31/22 2117 08/31/22 2130  sodium chloride (NORMAL SALINE) 0.9 % bolus 1,000 mL  ONCE         Last MAR action: New Bag ANDREIA JASMINE    08/31/22 2117 08/31/22 2130  morphine injection 2 mg  ONCE         Last MAR action: Given ANDREIA JASMINE               MDM         Clinical Impression     ED Diagnosis   1. Right ovarian cyst         Disposition        There is no disposition no dispo time  There is no comment    New Prescriptions    No medications on file       Andreia Jasmine CNP   9/1/2022 9:17 PM                      Andreia Jasmine CNP  09/01/22 0047       Andreia Jasmine CNP  09/01/22 0047

## 2023-12-09 NOTE — ED BEHAVIORAL HEALTH ASSESSMENT NOTE - COLLATERAL SOURCE
Date: 12-09-23     Day 3: Has surpassed a 2nd midnight with active treatments and services, which include . Medication adjustment PT/OT and supportive car Personal collateral

## 2024-10-31 NOTE — PATIENT PROFILE ADULT - PACKS PER DAY
Detail Level: Detailed Depth Of Biopsy: dermis Was A Bandage Applied: Yes Size Of Lesion In Cm: 0.7 X Size Of Lesion In Cm: 0 Biopsy Type: H and E Biopsy Method: Dermablade Anesthesia Type: 1% lidocaine with epinephrine Anesthesia Volume In Cc: 0.5 Hemostasis: Drysol Wound Care: Petrolatum Dressing: bandage Destruction After The Procedure: No Type Of Destruction Used: Curettage Curettage Text: The wound bed was treated with curettage after the biopsy was performed. Cryotherapy Text: The wound bed was treated with cryotherapy after the biopsy was performed. Electrodesiccation Text: The wound bed was treated with electrodesiccation after the biopsy was performed. Electrodesiccation And Curettage Text: The wound bed was treated with electrodesiccation and curettage after the biopsy was performed. Silver Nitrate Text: The wound bed was treated with silver nitrate after the biopsy was performed. Lab: -[id number] Consent: Written consent was obtained and risks were reviewed including but not limited to scarring, infection, bleeding, scabbing, incomplete removal, nerve damage and allergy to anesthesia. Post-Care Instructions: I reviewed with the patient in detail post-care instructions. Patient is to keep the biopsy site dry overnight, and then apply bacitracin twice daily until healed. Patient may apply hydrogen peroxide soaks to remove any crusting. Notification Instructions: Patient will be notified of biopsy results. However, patient instructed to call the office if not contacted within 2 weeks. Billing Type: Third-Party Bill Information: Selecting Yes will display possible errors in your note based on the variables you have selected. This validation is only offered as a suggestion for you. PLEASE NOTE THAT THE VALIDATION TEXT WILL BE REMOVED WHEN YOU FINALIZE YOUR NOTE. IF YOU WANT TO FAX A PRELIMINARY NOTE YOU WILL NEED TO TOGGLE THIS TO 'NO' IF YOU DO NOT WANT IT IN YOUR FAXED NOTE. 1

## 2024-12-04 NOTE — PROGRESS NOTE BEHAVIORAL HEALTH - NS ED [facility] MED ROS ALLERGIC IMMUNOLOGIC
Chart and PDMP reviewed, refill approved and sent  
Dr. Stuart's patient requesting refill. No med refill protocol. Please assist.   
No complaints
No complaints

## 2025-02-07 NOTE — PROGRESS NOTE ADULT - ASSESSMENT
"Subjective:      Patient ID: Savannah Frank 62 yo female with no known PMH recently  with subsequent IPP admission in [place] in [place] and relocation to [place] with family after discharge. Patient with unclear psychiatric h/o, discharge dx acute psychosis, and remote h/o polysubstance abuse with no reported suicide attempts. Patient brought in by niece for a higher level of care with the family unable to provide appropriate supervision.  Patient does not know why she was brought to the hospital and denies any concerns. She answers most questions with "I don't know", unable to list her medications, psychiatric diagnosis/hospitalizations, reason for presentation.    #) Cognitive impairment? Acute psychosis secondary to polysubstance abuse?  -  passed away about a month ago, Grievance vs depression along with cognitive impairment   - fluPHENAZine   - traZODone   - patient was admitted for ipp in [place] and was later discharged with the diagnosis of acute psychosis, currently patient has no symptoms  - Niece mentioned patient is unable to take care of herself, and family is unable to care for her, need facility to care for her  - psych consulted two times now: both agreed patient does not need to be in Inpatient psychiatry, but agreed need constant observation hence 1 to 1 due to unintentional risk of harm  - TSH/b12/symphilis negative  - Due to cognitive impairment patient is increased risk for unintentional self-harm.  - Denies suicidal ideation    # smoker  -nicotine - 21 mG/24Hr(s) Patch 1 patch Transdermal daily    # HCV positive (negative)  - appears to have been treated in the past  - patient/family does not know  - follow up outpatient?  - follow up lab for HCV    Activity: increase as tolerated  Gi ppx: no need  Dvt ppx: lovenox  Diet: regular  Dispo: pending placement  Code: full 64 yo female with no known PMH recently  with subsequent IPP admission in [place] in [place] and relocation to [place] with family after discharge. Patient with unclear psychiatric h/o, discharge dx acute psychosis, and remote h/o polysubstance abuse with no reported suicide attempts. Patient brought in by niece for a higher level of care with the family unable to provide appropriate supervision.  Patient does not know why she was brought to the hospital and denies any concerns. She answers most questions with "I don't know", unable to list her medications, psychiatric diagnosis/hospitalizations, reason for presentation.    #) Cognitive impairment? Acute psychosis secondary to polysubstance abuse?  -  passed away about a month ago, Grievance vs depression along with cognitive impairment   - fluPHENAZine   - traZODone   - patient was admitted for ipp in [place] and was later discharged with the diagnosis of acute psychosis, currently patient has no symptoms  - Niece mentioned patient is unable to take care of herself, and family is unable to care for her, need facility to care for her  - psych consulted two times now: both agreed patient does not need to be in Inpatient psychiatry, but agreed need constant observation hence 1 to 1 due to unintentional risk of harm  - TSH/b12/symphilis negative  - Due to cognitive impairment patient is increased risk for unintentional self-harm.  - Denies suicidal ideation    # smoker  -nicotine - 21 mG/24Hr(s) Patch 1 patch Transdermal daily    # HCV positive (negative)  - appears to have been treated in the past  - patient/family does not know  - follow up outpatient?  - follow up lab for HCV    Activity: increase as tolerated  Gi ppx: no need  Dvt ppx: lovenox  Diet: mechanical soft diet  Dispo: pending placement  Code: full 64 yo female with no known PMH recently  with subsequent IPP admission in [place] in [place] and relocation to [place] with family after discharge. Patient with unclear psychiatric h/o, discharge dx acute psychosis, and remote h/o polysubstance abuse with no reported suicide attempts. Patient brought in by niece for a higher level of care with the family unable to provide appropriate supervision.  Patient does not know why she was brought to the hospital and denies any concerns. She answers most questions with "I don't know", unable to list her medications, psychiatric diagnosis/hospitalizations, reason for presentation.    #) Cognitive impairment? Acute psychosis secondary to polysubstance abuse?  -  passed away about a month ago, Grievance vs depression along with cognitive impairment   - fluPHENAZine   - traZODone   - patient was admitted for ipp in [place] and was later discharged with the diagnosis of acute psychosis, currently patient has no symptoms  - Niece mentioned patient is unable to take care of herself, and family is unable to care for her, need facility to care for her  - psych consulted two times now: both agreed patient does not need to be in Inpatient psychiatry, but agreed need constant observation hence 1 to 1 due to unintentional risk of harm  - TSH/b12/symphilis negative  - Due to cognitive impairment patient is increased risk for unintentional self-harm.  - Denies suicidal ideation    # smoker  -nicotine - 21 mG/24Hr(s) Patch 1 patch Transdermal daily    # HCV positive (negative)  - appears to have been treated in the past  - patient/family does not know  - follow up outpatient?  - follow up lab for HCV    Activity: increase as tolerated  Gi ppx: no need  Dvt ppx: lovenox  Diet: mechanical soft diet  Dispo: pending placement ([facility] coming to evaluate the patient)  Code: full